# Patient Record
Sex: MALE
[De-identification: names, ages, dates, MRNs, and addresses within clinical notes are randomized per-mention and may not be internally consistent; named-entity substitution may affect disease eponyms.]

---

## 2019-02-26 ENCOUNTER — HOSPITAL ENCOUNTER (EMERGENCY)
Dept: HOSPITAL 97 - ER | Age: 59
Discharge: ANOTHER HOSPITAL | End: 2019-02-26
Payer: COMMERCIAL

## 2019-02-26 DIAGNOSIS — K72.00: Primary | ICD-10-CM

## 2019-02-26 DIAGNOSIS — C85.90: ICD-10-CM

## 2019-02-26 LAB
ALBUMIN SERPL BCP-MCNC: 3.3 G/DL (ref 3.4–5)
ALP SERPL-CCNC: 743 U/L (ref 45–117)
ALT SERPL W P-5'-P-CCNC: 541 U/L (ref 12–78)
AST SERPL W P-5'-P-CCNC: 307 U/L (ref 15–37)
BUN BLD-MCNC: 13 MG/DL (ref 7–18)
GLUCOSE SERPLBLD-MCNC: 92 MG/DL (ref 74–106)
HCT VFR BLD CALC: 43.3 % (ref 39.6–49)
INR BLD: 1.07
LYMPHOCYTES # SPEC AUTO: 1.5 K/UL (ref 0.7–4.9)
MAGNESIUM SERPL-MCNC: 1.9 MG/DL (ref 1.8–2.4)
NT-PROBNP SERPL-MCNC: 23 PG/ML (ref ?–125)
PMV BLD: 7.4 FL (ref 7.6–11.3)
POTASSIUM SERPL-SCNC: 3.8 MMOL/L (ref 3.5–5.1)
RBC # BLD: 4.99 M/UL (ref 4.33–5.43)
TROPONIN (EMERG DEPT USE ONLY): < 0.02 NG/ML (ref 0–0.04)

## 2019-02-26 PROCEDURE — 85610 PROTHROMBIN TIME: CPT

## 2019-02-26 PROCEDURE — 74177 CT ABD & PELVIS W/CONTRAST: CPT

## 2019-02-26 PROCEDURE — 36415 COLL VENOUS BLD VENIPUNCTURE: CPT

## 2019-02-26 PROCEDURE — 84484 ASSAY OF TROPONIN QUANT: CPT

## 2019-02-26 PROCEDURE — 71260 CT THORAX DX C+: CPT

## 2019-02-26 PROCEDURE — 80048 BASIC METABOLIC PNL TOTAL CA: CPT

## 2019-02-26 PROCEDURE — 83880 ASSAY OF NATRIURETIC PEPTIDE: CPT

## 2019-02-26 PROCEDURE — 93005 ELECTROCARDIOGRAM TRACING: CPT

## 2019-02-26 PROCEDURE — 80076 HEPATIC FUNCTION PANEL: CPT

## 2019-02-26 PROCEDURE — 71045 X-RAY EXAM CHEST 1 VIEW: CPT

## 2019-02-26 PROCEDURE — 83735 ASSAY OF MAGNESIUM: CPT

## 2019-02-26 PROCEDURE — 85025 COMPLETE CBC W/AUTO DIFF WBC: CPT

## 2019-02-26 NOTE — RAD REPORT
EXAM DESCRIPTION:  CT - Chest Abdomen Pelvis W Cont - 2/26/2019 8:00 pm

 

CLINICAL HISTORY:  Abdominal pain, chest pain, abnormal CT abdomen

 

COMPARISON:  CT abdomen and pelvis February 13

 

TECHNIQUE:  Following dynamic enhancement using 100 milliliters nonionic IV contrast, axial imaging o
f the chest, abdomen and pelvis was performed.  Biphasic technique was utilized through the abdomen. 
No oral contrast was administered.

 

All CT scans are performed using dose optimization technique as appropriate and may include automated
 exposure control or mA/KV adjustment according to patient size.

 

FINDINGS:  Stranding changes are present in the right lung base where there is also elevated right he
midiaphragm. This is believed to be scarring or chronic atelectasis. Acute infiltrate is not suspecte
d.

 

Lung fields are otherwise clear. Minimal bilateral pleural effusions are present. No pneumothorax. No
 pleural based mass. Aorta and pulmonary arterial tree enhance normally. No pericardial thickening or
 effusion. Patient has very extensive individual and confluent mediastinal and hilar lymphadenopathy.
 Patient has lymphadenopathy in the right side pericardial fat as well. Largest confluent mass densit
y is subcarinal measuring 6 x 3 cm. No chest wall mass or axillary lymphadenopathy.

 

Multiple abnormal lymph nodes are present at the bilateral base of the neck.

 

The liver, spleen and pancreas show no new finding from the February 13 study. Portions of the pancre
as are obscured. No biliary tree dilatation. Gallbladder wall is edematous. Gallstones can be occult 
on CT imaging. Renal function is symmetric. No acute GB finding. No adrenal abnormalities.

 

Stomach is distended by fluid. No gastric wall thickening or mass. No dilated large or small bowel. M
ild diverticulosis is present. The primary acute GI process is not confirmed.

 

Congestion and edema are present along the omentum. There is an overall congestion or edema pattern t
o the peritoneal fat. Trace amount of free fluid is present in the peritoneal cavity. The very large 
bulky central mesenteric lymphadenopathy pattern is again identified. Overall mass density is larger 
than seen 2 weeks earlier. Margins are less well demarcated and this may be due to edema. Lymphadenop
athy extends further around the head of the pancreas and periportal region. No history provided to in
dicate initiation of therapy for this presumed lymphoma. The congestion and edema could be a therapeu
tic response.

 

No acute or destructive bony process.

 

No significant vascular findings.

 

 

IMPRESSION:  CT chest imaging shows extensive mediastinal and hilar lymphadenopathy as well as signif
icant lymphadenopathy at each neck base.

 

Minimal pleural effusions are present. There is scarring or chronic atelectasis abutting the elevated
 right hemidiaphragm.

 

The massive central abdominal lymphadenopathy has enlarged from the February 13 study. The individual
 and confluent lymph nodes are more edematous with poorly demarcated margins compared to the prior st
udy. This is potentially a therapeutic response but no history is available to indicate whether any t
herapy has been initiated.

 

No bowel obstruction, free air or surgically emergent finding.

 

Gallbladder wall edema without biliary tree dilatation. Gallstones and duct stones can be occult on C
T imaging.

## 2019-02-26 NOTE — ER
Nurse's Notes                                                                                     

 [facility]                                                                

Name: Paul Quintero                                                                                

Age: 58 yrs                                                                                       

Sex: Male                                                                                         

: 1960                                                                                   

MRN: [id number]                                                                                   

Arrival Date: 2019                                                                          

Time: 17:52                                                                                       

Account#: [id number]                                                                            

Bed 16                                                                                            

Private MD:                                                                                       

Diagnosis: Acute and subacute hepatic failure;Lymphoma                                            

                                                                                                  

Presentation:                                                                                     

                                                                                             

18:12 Presenting complaint: Patient states: Diffuse abdominal pain that radiates to back x 2  ph  

      weeks and abdominal bloating, also reports nausea and some diarrhea, denies vomiting or     

      fever. Transition of care: patient was not received from another setting of care. Onset     

      of symptoms was 2019. Risk Assessment: Do you want to hurt yourself or         

      someone else? Patient reports no desire to harm self or others. Care prior to arrival:      

      None.                                                                                       

18:12 Method Of Arrival: Ambulatory                                                           ph  

18:12 Acuity: CHELO 3                                                                           ph  

18:15 Initial Sepsis Screen: Does the patient meet any 2 criteria? No. Patient's initial      tw2 

      sepsis screen is negative. Does the patient have a suspected source of infection? No.       

      Patient's initial sepsis screen is negative.                                                

                                                                                                  

Historical:                                                                                       

- Allergies:                                                                                      

18:14 No Known Allergies;                                                                     ph  

- Home Meds:                                                                                      

22:00 None [Active];                                                                          ak1 

- PMHx:                                                                                           

22:00 None;                                                                                   ak1 

- PSHx:                                                                                           

22:00 None;                                                                                   ak1 

                                                                                                  

- Immunization history:: Adult Immunizations unknown.                                             

- Social history:: Smoking status: Patient/guardian denies using tobacco.                         

- Ebola Screening: : No symptoms or risks identified at this time.                                

                                                                                                  

                                                                                                  

Screenin:15 Abuse screen: Denies threats or abuse. Nutritional screening: No deficits noted.        tw2 

      Tuberculosis screening: No symptoms or risk factors identified. Fall Risk None              

      identified.                                                                                 

                                                                                                  

Assessment:                                                                                       

18:27 General: Appears in no apparent distress. uncomfortable, Behavior is calm, cooperative, tw2 

      appropriate for age. Pain: Complains of pain in abdomen. Neuro: Level of Consciousness      

      is awake, alert, obeys commands, Oriented to person, place, time, situation.                

      Cardiovascular: Heart tones S1 S2 Patient's skin is warm and dry. Respiratory: Airway       

      is patent Respiratory effort is even, unlabored, Respiratory pattern is regular,            

      symmetrical, Breath sounds are clear bilaterally. GI: Abdomen is round distended, Bowel     

      sounds present X 4 quads. Abd is soft X 4 quads Reports lower abdominal pain, upper         

      abdominal pain. : No signs and/or symptoms were reported regarding the genitourinary      

      system.                                                                                     

18:27 EENT: No signs and/or symptoms were reported regarding the EENT system. Derm: No signs  tw2 

      and/or symptoms reported regarding the dermatologic system. Skin is jaundiced.              

      Musculoskeletal: Circulation, motion, and sensation intact. Range of motion: intact in      

      all extremities.                                                                            

18:36 Reassessment: provider BINU Snow at bedside.                                           tw2 

19:38 Reassessment: laboratory staff tamara relayed critical laboratory ASt                 rr5 

      307,DTB115,Total bili6.1. ED provider aware.                                                

21:01 Reassessment: Patient appears in no apparent distress at this time. Patient and/or      ls4 

      family updated on plan of care and expected duration. Pain level reassessed. PT RESTING     

      QUIETLY, FAMILY AT BEDSIDE.                                                                 

21:51 Reassessment: Patient appears in no apparent distress at this time. Patient and/or      ls4 

      family updated on plan of care and expected duration. Pain level reassessed. pt             

      ambulated independently to bathroom. gate steady. NAD.                                      

                                                                                                  

Vital Signs:                                                                                      

18:13  / 102; Pulse 97; Resp 18; Temp 97.8; Pulse Ox 95% on R/A; Weight 79.38 kg;       ph  

      Height 5 ft. 3 in. (160.02 cm);                                                             

19:02  / 99; Pulse 91; Resp 20; Pulse Ox 92% on R/A;                                    tw2 

20:20  / 88; Pulse 92; Resp 16; Pulse Ox 96% on R/A;                                    ls4 

18:13 Body Mass Index 31.00 (79.38 kg, 160.02 cm)                                             ph  

19:02 pt placed on o2 via nc at 2L, o2 at 96%,                                                tw2 

                                                                                                  

ED Course:                                                                                        

17:52 Patient arrived in ED.                                                                  mr  

18:12 Bed in low position. Call light in reach. Pulse ox on. NIBP on.                         tw2 

18:13 Triage completed.                                                                       ph  

18:14 Arm band placed on Patient placed in an exam room.                                      ph  

18:15 Cuca Land, BYRON is Primary Nurse.                                                        tw2 

18:30 Obinna Perez MD is Attending Physician.                                             [email or web address] 

18:37 Dustin Peacock PA is PHCP.                                                               [id number] 

18:37 Obinna Perez MD is Attending Physician.                                             [id number] 

18:42 Radiology exam delayed due to lab results not completed at this time. (BUN/Creatinine). 2 

18:49 EKG done, by ED staff, reviewed by Dustin SCHMID.                                      [facility] 

18:53 Radiology exam delayed due to lab results not completed at this time. (BUN/Creatinine). nj  

18:53 XRAY Chest (1 view) In Process Unspecified.                                             EDMS

19:21 Radiology exam delayed due to lab results not completed at this time. (BUN/Creatinine). 2 

19:26 Report given to BYRON Cantrell.                                                                tw2 

19:36 Radiology exam delayed due to lab results not completed at this time. (BUN/Creatinine). nj  

19:57 Patient moved to CT via stretcher.                                                      nj  

20:00 CT Chest, Abdomen, Pelvis - W/Contrast In Process Unspecified.                          EDMS

20:00 CT completed. Patient tolerated procedure well. Patient moved back from CT.             nj  

21:59 No provider procedures requiring assistance completed.                                  ak1 

22:01 Inserted saline lock: 20 gauge in left antecubital area, using aseptic technique.       ak1 

      ,using aseptic technique. placed prior to this nurse taking over care.                      

22:02 Patient transferred, IV remains in place.                                               ak1 

                                                                                                  

Administered Medications:                                                                         

18:47 Drug: Zofran 4 mg Route: IVP; Site: left antecubital;                                   tw2 

19:25 Follow up: Response: No adverse reaction                                                tw2 

18:50 Drug: fentaNYL (PF) 75 mcg Route: IVP; Site: left antecubital;                          tw2 

19:25 Follow up: Response: No adverse reaction; Pain is decreased                             tw2 

                                                                                                  

                                                                                                  

Outcome:                                                                                          

21:59 ER care complete, transfer ordered by MD.                                               [facility] 

22:02 Condition: stable                                                                       ak1 

22:02 Instructed on the need for transfer.                                                        

22:34 Transferred by ground EMS to [facility], Transfer form completed.    ak1 

      X-rays sent w/ patient. Note:  report given to Ghislaine the nurse for room  910 on 9      

      tower                                                                                       

23:07 Patient left the ED.                                                                    ak1 

                                                                                                  

Signatures:                                                                                       

Dispatcher [employer]                           EDMS                                                 

Obinna Perez MD MD cha Rivera, Soledad                                 mr                                                   

Inezesiva, BINU Chan   jr8                                                  

Lauren Herrera RN                       RN   ak1                                                  

Viridiana Inman RN RN                                                      

Cuca Land RN RN   2                                                  

Doni Isabel Maria                              [facility]                                                  

WorkmanRadha craig                            2                                                  

Tamia Arcos RN                       RN   ls4                                                  

Diallo Hernandez RN                      RN   rr5                                                  

                                                                                                  

Corrections: (The following items were deleted from the chart)                                    

19:04 18:27 General: Appears in no apparent distress. Behavior is calm, cooperative,          tw2 

      appropriate for age, tw2                                                                    

21:53 21:01 Reassessment: Patient appears in no apparent distress at this time. Patient       ls4 

      and/or family updated on plan of care and expected duration. Pain level reassessed.         

      Patient is alert, oriented x 3, equal unlabored respirations, skin warm/dry/pink. PT        

      RESTING QUIETLY, FAMILY AT BEDSIDE ls4                                                      

21:53 21:51 Reassessment: Patient appears in no apparent distress at this time. Patient       ls4 

      and/or family updated on plan of care and expected duration. Pain level reassessed.         

      Patient is alert, oriented x 3, equal unlabored respirations, skin warm/dry/pink. ls4       

                                                                                                  

**************************************************************************************************

## 2019-02-26 NOTE — EDPHYS
Physician Documentation                                                                           

 [facility]                                                                

Name: Paul Quintero                                                                                

Age: 58 yrs                                                                                       

Sex: Male                                                                                         

: 1960                                                                                   

MRN: [id number]                                                                                   

Arrival Date: 2019                                                                          

Time: 17:52                                                                                       

Account#: [id number]                                                                            

Bed 16                                                                                            

Private MD:                                                                                       

ED Physician Obinna Perez                                                                      

HPI:                                                                                              

                                                                                             

19:24 This 58 yrs old  Male presents to ER via Ambulatory with complaints of          jr8 

      Abdominal Swelling.                                                                         

19:24 Onset: The symptoms/episode began/occurred gradually, 2 week(s) ago. The symptoms do    jr8 

      not radiate. Associated signs and symptoms: none. The symptoms are described as dull.       

      Modifying factors: The symptoms are alleviated by nothing, the symptoms are aggravated      

      by nothing. Severity of pain: At its worst the pain was moderate in the emergency           

      department the pain is unchanged. The patient has not experienced similar symptoms in       

      the past. The patient has been recently seen by a physician:. Patient stated that he        

      has had abdominal pain for the past couple of weeks. Stated that he was seen by GI and      

      had CT abd/pelvis with contrast completed. Unknown what results are but brought report      

      for us to read. Stated that they have not told him anything yet. Had endoscopy a week       

      later showing gastritis. Scheduled for colonoscopy next week. Stated that pain is           

      getting worse along with distension .                                                       

                                                                                                  

Historical:                                                                                       

- Allergies:                                                                                      

18:14 No Known Allergies;                                                                     ph  

- Home Meds:                                                                                      

22:00 None [Active];                                                                          ak1 

- PMHx:                                                                                           

22:00 None;                                                                                   ak1 

- PSHx:                                                                                           

22:00 None;                                                                                   ak1 

                                                                                                  

- Immunization history:: Adult Immunizations unknown.                                             

- Social history:: Smoking status: Patient/guardian denies using tobacco.                         

- Ebola Screening: : No symptoms or risks identified at this time.                                

                                                                                                  

                                                                                                  

ROS:                                                                                              

19:24 Eyes: Negative for injury, pain, redness, and discharge, ENT: Negative for injury,      jr8 

      pain, and discharge, Neck: Negative for injury, pain, and swelling, Cardiovascular:         

      Negative for chest pain, palpitations, and edema, Respiratory: Negative for shortness       

      of breath, cough, wheezing, and pleuritic chest pain, Back: Negative for injury and         

      pain, MS/Extremity: Negative for injury and deformity, Skin: Negative for injury, rash,     

      and discoloration, Neuro: Negative for headache, weakness, numbness, tingling, and          

      seizure.                                                                                    

19:24 Abdomen/GI: Positive for abdominal pain, abdominal distension, Negative for nausea,         

      vomiting, and diarrhea, constipation, anorexia, dysphagia, hematemesis, black/tarry         

      stool, rectal pain, rectal bleeding, bowel incontinence, flatulence.                        

                                                                                                  

Exam:                                                                                             

19:24 Head/Face:  Normocephalic, atraumatic. ENT:  Nares patent. No nasal discharge, no       jr8 

      septal abnormalities noted.  Tympanic membranes are normal and external auditory canals     

      are clear.  Oropharynx with no redness, swelling, or masses, exudates, or evidence of       

      obstruction, uvula midline.  Mucous membranes moist. Neck:  Trachea midline, no             

      thyromegaly or masses palpated, and no cervical lymphadenopathy.  Supple, full range of     

      motion without nuchal rigidity, or vertebral point tenderness.  No Meningismus.             

      Cardiovascular:  Regular rate and rhythm with a normal S1 and S2.  No gallops, murmurs,     

      or rubs.  Normal PMI, no JVD.  No pulse deficits. Respiratory:  Lungs have equal breath     

      sounds bilaterally, clear to auscultation and percussion.  No rales, rhonchi or wheezes     

      noted.  No increased work of breathing, no retractions or nasal flaring. Back:  No          

      spinal tenderness.  No costovertebral tenderness.  Full range of motion. Skin:  Warm,       

      dry with normal turgor.  Normal color with no rashes, no lesions, and no evidence of        

      cellulitis. MS/ Extremity:  Pulses equal, no cyanosis.  Neurovascular intact.  Full,        

      normal range of motion. Neuro:  Awake and alert, GCS 15, oriented to person, place,         

      time, and situation.  Cranial nerves II-XII grossly intact.  Motor strength 5/5 in all      

      extremities.  Sensory grossly intact.  Cerebellar exam normal.  Normal gait.                

19:24 Eyes: Periorbital structures: appear normal, Pupils: equal, round, and reactive to          

      light and accomodation, Extraocular movements: intact throughout, Conjunctiva: normal,      

      Corneas: are normal, Sclera: icterus, is present, Anterior chamber: normal, Lids and        

      lashes: appear normal.                                                                      

19:24 Abdomen/GI: Inspection: distension, Bowel sounds: active, all quadrants, Palpation:         

      soft, in all quadrants, mild abdominal tenderness, in the abdomen diffusely,                

      Indicators: McBurney's point is not tender, Ross's sign is negative, Rovsing's sign       

      is negative, Liver: tenderness, is not appreciated.                                         

                                                                                                  

Vital Signs:                                                                                      

18:13  / 102; Pulse 97; Resp 18; Temp 97.8; Pulse Ox 95% on R/A; Weight 79.38 kg;       ph  

      Height 5 ft. 3 in. (160.02 cm);                                                             

19:02  / 99; Pulse 91; Resp 20; Pulse Ox 92% on R/A;                                    tw2 

20:20  / 88; Pulse 92; Resp 16; Pulse Ox 96% on R/A;                                    ls4 

18:13 Body Mass Index 31.00 (79.38 kg, 160.02 cm)                                             ph  

19:02 pt placed on o2 via nc at 2L, o2 at 96%,                                                tw2 

                                                                                                  

MDM:                                                                                              

18:30 Patient medically screened.                                                             [email or web address] 

21:17 Data reviewed: vital signs, nurses notes, lab test result(s), radiologic studies, CT    jr 

      scan, plain films. Data interpreted: Pulse oximetry: on room air is 96 %.                   

      Interpretation: normal. Counseling: I had a detailed discussion with the patient and/or     

      guardian regarding: the historical points, exam findings, and any diagnostic results        

      supporting the discharge/admit diagnosis, lab results, radiology results, the need to       

      transfer to another facility, for higher level of care, [facility]        

      does not immediately have the required specialist.                                          

21:57 ED course: Dr. Burns from [facility] consulted along with GI and Oncology .                                                                                                           

18:38 Order name: Basic Metabolic Panel; Complete Time: 19:41                                                                                                                              

18:38 Order name: CBC with Diff; Complete Time: 19:23                                                                                                                                      

18:38 Order name: LFT's; Complete Time: 19:41                                                                                                                                              

18:38 Order name: Magnesium; Complete Time: 19:41                                                                                                                                          

18:38 Order name: NT PRO-BNP; Complete Time: 19:41                                                                                                                                         

18:38 Order name: PT-INR; Complete Time: 19:23                                                                                                                                             

18:38 Order name: Troponin (emerg Dept Use Only); Complete Time: 19:41                                                                                                                     

18:38 Order name: XRAY Chest (1 view); Complete Time: 19:23                                                                                                                                

18:38 Order name: EKG; Complete Time: 18:38                                                                                                                                                

18:38 Order name: Cardiac monitoring; Complete Time: 18:41                                                                                                                                 

18:38 Order name: EKG - Nurse/Tech; Complete Time: 18:41                                                                                                                                   

18:38 Order name: CT Chest, Abdomen, Pelvis - W/Contrast; Complete Time: 21:05                                                                                                             

18:38 Order name: IV Saline Lock; Complete Time: 19:02                                                                                                                                     

18:38 Order name: Labs collected and sent; Complete Time: 19:02                                                                                                                            

18:38 Order name: O2 Per Protocol; Complete Time: 18:41                                                                                                                                    

18:38 Order name: O2 Sat Monitoring; Complete Time: 18:41                                      

                                                                                                  

Administered Medications:                                                                         

18:47 Drug: Zofran 4 mg Route: IVP; Site: left antecubital;                                   tw2 

19:25 Follow up: Response: No adverse reaction                                                tw2 

18:50 Drug: fentaNYL (PF) 75 mcg Route: IVP; Site: left antecubital;                          tw2 

19:25 Follow up: Response: No adverse reaction; Pain is decreased                             tw2 

                                                                                                  

                                                                                                  

Disposition:                                                                                      

19 21:59 Transfer ordered to [facility]. Diagnosis are Acute and        

  subacute hepatic failure, Lymphoma .                                                            

- Reason for transfer: Higher level of care.                                                      

- Accepting physician is Dr. Burns.                                                               

- Condition is Fair.                                                                              

- Problem is new.                                                                                 

- Symptoms are unchanged.                                                                         

                                                                                                  

                                                                                                  

                                                                                                  

Signatures:                                                                                       

Dispatcher [employer]                           EDObinna Castaneda MD MD cha Roszak, Josh, PA PA   jr8                                                  

Lauren Herrera RN                       RN   ak1                                                  

Viridiana Inman RN                      RN                                                      

Cuca Land RN                          RN   tw2                                                  

                                                                                                  

Corrections: (The following items were deleted from the chart)                                    

23:07 21:59 2019 21:59 Transfer ordered to [facility]. Diagnosis is ak1 

      Acute and subacute hepatic failure; Lymphoma . Reason for transfer: Higher level of         

      care. Accepting physician is Dr. Burns. Condition is Fair. Problem is new. Symptoms are     

      unchanged. jr8                                                                              

                                                                                                  

**************************************************************************************************

## 2019-02-26 NOTE — RAD REPORT
EXAM DESCRIPTION:  RAD - Chest Single View - 2/26/2019 6:52 pm

 

CLINICAL HISTORY:  Diffuse abdominal pain radiating to the back

 

COMPARISON:  CT imaging February 13.

 

TECHNIQUE:  AP portable chest image was obtained 1847 hour .

 

FINDINGS:  Lung volumes are low. Right base opacification is present obscuring the right hemidiaphrag
m and the right heart border. Patient has right hemidiaphragm elevation. Right base opacification may
 be chronic atelectasis. A small pleural effusion is suspected. Heart size is normal. Widening in the
 mediastinum is noted. Reason CT study showed significant lymphadenopathy pattern in the abdomen and 
pelvis. This mediastinal finding is likely lymphadenopathy as well. No measurable pleural effusion an
d no pneumothorax. No acute bony abnormality seen. No acute aortic findings suspected.

 

IMPRESSION:  Small right pleural effusion is suspected. Right lung base opacification could be chroni
c atelectasis or minimal pneumonia.

 

Mediastinal mass density present presumed to be lymphadenopathy pattern similar to the CT abdomen fin
ding.

## 2019-04-14 ENCOUNTER — HOSPITAL ENCOUNTER (INPATIENT)
Dept: HOSPITAL 97 - ER | Age: 59
LOS: 3 days | Discharge: HOME | DRG: 186 | End: 2019-04-17
Attending: FAMILY MEDICINE | Admitting: HOSPITALIST
Payer: COMMERCIAL

## 2019-04-14 VITALS — BODY MASS INDEX: 25.2 KG/M2

## 2019-04-14 DIAGNOSIS — J90: Primary | ICD-10-CM

## 2019-04-14 DIAGNOSIS — B19.10: ICD-10-CM

## 2019-04-14 DIAGNOSIS — D70.9: ICD-10-CM

## 2019-04-14 DIAGNOSIS — D63.8: ICD-10-CM

## 2019-04-14 DIAGNOSIS — C85.99: ICD-10-CM

## 2019-04-14 DIAGNOSIS — I10: ICD-10-CM

## 2019-04-14 DIAGNOSIS — R73.9: ICD-10-CM

## 2019-04-14 DIAGNOSIS — R50.81: ICD-10-CM

## 2019-04-14 DIAGNOSIS — K21.9: ICD-10-CM

## 2019-04-14 DIAGNOSIS — Z87.891: ICD-10-CM

## 2019-04-14 DIAGNOSIS — D72.819: ICD-10-CM

## 2019-04-14 DIAGNOSIS — J18.9: ICD-10-CM

## 2019-04-14 LAB
ALBUMIN SERPL BCP-MCNC: 2.6 G/DL (ref 3.4–5)
ALP SERPL-CCNC: 108 U/L (ref 45–117)
ALT SERPL W P-5'-P-CCNC: 50 U/L (ref 12–78)
AST SERPL W P-5'-P-CCNC: 24 U/L (ref 15–37)
BUN BLD-MCNC: 17 MG/DL (ref 7–18)
GLUCOSE SERPLBLD-MCNC: 104 MG/DL (ref 74–106)
HCT VFR BLD CALC: 32.3 % (ref 39.6–49)
INR BLD: 1.28
LIPASE SERPL-CCNC: 117 U/L (ref 73–393)
LYMPHOCYTES # SPEC AUTO: 1.2 K/UL (ref 0.7–4.9)
MORPHOLOGY BLD-IMP: (no result)
PMV BLD: 7.5 FL (ref 7.6–11.3)
POTASSIUM SERPL-SCNC: 3.6 MMOL/L (ref 3.5–5.1)
RBC # BLD: 3.93 M/UL (ref 4.33–5.43)
STOMATOCYTES BLD QL SMEAR: (no result)
TROPONIN (EMERG DEPT USE ONLY): < 0.02 NG/ML (ref 0–0.04)

## 2019-04-14 PROCEDURE — 32555 ASPIRATE PLEURA W/ IMAGING: CPT

## 2019-04-14 PROCEDURE — 85014 HEMATOCRIT: CPT

## 2019-04-14 PROCEDURE — 83690 ASSAY OF LIPASE: CPT

## 2019-04-14 PROCEDURE — 81003 URINALYSIS AUTO W/O SCOPE: CPT

## 2019-04-14 PROCEDURE — 85018 HEMOGLOBIN: CPT

## 2019-04-14 PROCEDURE — 93005 ELECTROCARDIOGRAM TRACING: CPT

## 2019-04-14 PROCEDURE — 96375 TX/PRO/DX INJ NEW DRUG ADDON: CPT

## 2019-04-14 PROCEDURE — 71045 X-RAY EXAM CHEST 1 VIEW: CPT

## 2019-04-14 PROCEDURE — 71046 X-RAY EXAM CHEST 2 VIEWS: CPT

## 2019-04-14 PROCEDURE — 80061 LIPID PANEL: CPT

## 2019-04-14 PROCEDURE — 85610 PROTHROMBIN TIME: CPT

## 2019-04-14 PROCEDURE — 80202 ASSAY OF VANCOMYCIN: CPT

## 2019-04-14 PROCEDURE — 84132 ASSAY OF SERUM POTASSIUM: CPT

## 2019-04-14 PROCEDURE — 80048 BASIC METABOLIC PNL TOTAL CA: CPT

## 2019-04-14 PROCEDURE — 87040 BLOOD CULTURE FOR BACTERIA: CPT

## 2019-04-14 PROCEDURE — 85730 THROMBOPLASTIN TIME PARTIAL: CPT

## 2019-04-14 PROCEDURE — 81015 MICROSCOPIC EXAM OF URINE: CPT

## 2019-04-14 PROCEDURE — 94760 N-INVAS EAR/PLS OXIMETRY 1: CPT

## 2019-04-14 PROCEDURE — 96366 THER/PROPH/DIAG IV INF ADDON: CPT

## 2019-04-14 PROCEDURE — 94640 AIRWAY INHALATION TREATMENT: CPT

## 2019-04-14 PROCEDURE — 85025 COMPLETE CBC W/AUTO DIFF WBC: CPT

## 2019-04-14 PROCEDURE — 83605 ASSAY OF LACTIC ACID: CPT

## 2019-04-14 PROCEDURE — 83735 ASSAY OF MAGNESIUM: CPT

## 2019-04-14 PROCEDURE — 96368 THER/DIAG CONCURRENT INF: CPT

## 2019-04-14 PROCEDURE — 93306 TTE W/DOPPLER COMPLETE: CPT

## 2019-04-14 PROCEDURE — 76604 US EXAM CHEST: CPT

## 2019-04-14 PROCEDURE — 88305 TISSUE EXAM BY PATHOLOGIST: CPT

## 2019-04-14 PROCEDURE — 99285 EMERGENCY DEPT VISIT HI MDM: CPT

## 2019-04-14 PROCEDURE — 84145 PROCALCITONIN (PCT): CPT

## 2019-04-14 PROCEDURE — 84484 ASSAY OF TROPONIN QUANT: CPT

## 2019-04-14 PROCEDURE — 89050 BODY FLUID CELL COUNT: CPT

## 2019-04-14 PROCEDURE — 36415 COLL VENOUS BLD VENIPUNCTURE: CPT

## 2019-04-14 PROCEDURE — 96365 THER/PROPH/DIAG IV INF INIT: CPT

## 2019-04-14 PROCEDURE — 88108 CYTOPATH CONCENTRATE TECH: CPT

## 2019-04-14 PROCEDURE — 71260 CT THORAX DX C+: CPT

## 2019-04-14 PROCEDURE — 87070 CULTURE OTHR SPECIMN AEROBIC: CPT

## 2019-04-14 PROCEDURE — 80076 HEPATIC FUNCTION PANEL: CPT

## 2019-04-14 PROCEDURE — 84100 ASSAY OF PHOSPHORUS: CPT

## 2019-04-14 PROCEDURE — 80053 COMPREHEN METABOLIC PANEL: CPT

## 2019-04-14 NOTE — XMS REPORT
Patient Summary Document

 Created on:2019



Patient:CAR SANCHEZ

Sex:Male

:1960

External Reference #:[id number]





Demographics







 Address  [address] [place] [address]

 

 Home Phone  [phone]

 

 Work Phone  [phone]

 

 Email Address  [email or web address]

 

 Preferred Language  Unknown

 

 Marital Status  Unknown

 

 Roman Catholic Affiliation  Unknown

 

 Race  Unknown

 

 Additional Race(s)  Unavailable

 

 Ethnic Group  Unknown









Author







 Organization  [facility]

 

 Address  [address]



   [place] [address]

 

 Phone  [phone]









Care Team Providers







 Name  Role  Phone

 

 RAMONALEMUEL BARBARA  Unavailable  Unavailable

 

 ANAND MORTON  Unavailable  Unavailable

 

 SAPNA MERRITT  Unavailable  Unavailable









Problems

This patient has no known problems.



Allergies, Adverse Reactions, Alerts

This patient has no known allergies or adverse reactions.



Medications

This patient has no known medications.



Results







 Test Description  Test Time  Test Comments  Text Results  Atomic Results  
Result Comments









 URIC ACID  2019 05:44:00    









   Test Item  Value  Reference Range  Comments









 URIC ACID (BEAKER) (test code=773)  1.6 mg/dL  2.6-7.2  



[id number] 05:36:00





 Test Item  Value  Reference Range  Comments

 

 MAGNESIUM (BEAKER) (test code=627)  1.8 mg/dL  1.6-2.6  



[id number] 05:36:00





 Test Item  Value  Reference Range  Comments

 

 PHOSPHORUS (BEAKER) (test code=604)  3.1 mg/dL  2.3-4.7  



BASIC METABOLIC [id number] 05:36:00





 Test Item  Value  Reference Range  Comments

 

 SODIUM (BEAKER) (test  137 meq/L  136-145  



 dxip=486)      

 

 POTASSIUM (BEAKER) (test  3.4 meq/L  3.5-5.1  



 code=379)      

 

 CHLORIDE (BEAKER) (test  104 meq/L    



 code=382)      

 

 CO2 (BEAKER) (test  26 meq/L  22-29  



 code=355)      

 

 BLOOD UREA NITROGEN  11 mg/dL  7-21  



 (BEAKER) (test code=354)      

 

 CREATININE (BEAKER) (test  0.59 mg/dL  0.57-1.25  



 code=358)      

 

 GLUCOSE RANDOM (BEAKER)  134 mg/dL    



 (test code=652)      

 

 CALCIUM (BEAKER) (test  8.3 mg/dL  8.4-10.2  



 code=697)      

 

 EGFR (BEAKER) (test  141 mL/min/1.73 sq m    ESTIMATED GFR IS NOT AS



 code=1092)      ACCURATE AS CREATININE



       CLEARANCE IN PREDICTING



       GLOMERULAR FILTRATION



       RATE. ESTIMATED GFR IS



       NOT APPLICABLE FOR



       DIALYSIS PATIENTS.



HEPATIC FUNCTION [id number] 05:36:00





 Test Item  Value  Reference Range  Comments

 

 TOTAL PROTEIN (BEAKER) (test code=770)  5.9 gm/dL  6.0-8.3  

 

 ALBUMIN (BEAKER) (test code=1145)  2.7 g/dL  3.5-5.0  

 

 BILIRUBIN TOTAL (BEAKER) (test code=377)  1.1 mg/dL  0.2-1.2  

 

 BILIRUBIN DIRECT (BEAKER) (test code=706)  0.5 mg/dL  0.1-0.5  

 

 ALKALINE PHOSPHATASE (BEAKER) (test code=346)  96 U/L    

 

 AST (SGOT) (BEAKER) (test code=353)  28 U/L  5-34  

 

 ALT (SGPT) (BEAKER) (test code=347)  45 U/L  6-55  



LACTATE DEHYDROGENASE (LDH)2019 05:36:00





 Test Item  Value  Reference Range  Comments

 

 LACTATE DEHYDROGENASE (BEAKER) (test code=635)  219 U/L  125-220  



CBC W/PLT COUNT &amp; AUTO [id number] 05:13:00





 Test Item  Value  Reference Range  Comments

 

 WHITE BLOOD CELL COUNT (BEAKER) (test code=775)  6.6 K/ L  3.5-10.5  

 

 RED BLOOD CELL COUNT (BEAKER) (test code=761)  3.40 M/ L  4.63-6.08  

 

 HEMOGLOBIN (BEAKER) (test code=410)  9.3 GM/DL  13.7-17.5  

 

 HEMATOCRIT (BEAKER) (test code=411)  28.6 %  40.1-51.0  

 

 MEAN CORPUSCULAR VOLUME (BEAKER) (test code=753)  84.1 fL  79.0-92.2  

 

 MEAN CORPUSCULAR HEMOGLOBIN (BEAKER) (test  27.4 pg  25.7-32.2  



 thxk=979)      

 

 MEAN CORPUSCULAR HEMOGLOBIN CONC (BEAKER) (test  32.5 GM/DL  32.3-36.5  



 code=752)      

 

 RED CELL DISTRIBUTION WIDTH (BEAKER) (test  13.2 %  11.6-14.4  



 code=412)      

 

 PLATELET COUNT (BEAKER) (test code=756)  373 K/CU MM  150-450  

 

 MEAN PLATELET VOLUME (BEAKER) (test code=754)  9.0 fL  9.4-12.4  

 

 NUCLEATED RED BLOOD CELLS (BEAKER) (test  0 /100 WBC  0-0  



 code=413)      

 

 NEUTROPHILS RELATIVE PERCENT (BEAKER) (test  82 %    



 code=429)      

 

 LYMPHOCYTES RELATIVE PERCENT (BEAKER) (test  16 %    



 code=430)      

 

 MONOCYTES RELATIVE PERCENT (BEAKER) (test  1 %    



 code=431)      

 

 EOSINOPHILS RELATIVE PERCENT (BEAKER) (test  0 %    



 code=432)      

 

 BASOPHILS RELATIVE PERCENT (BEAKER) (test  0 %    



 code=437)      

 

 NEUTROPHILS ABSOLUTE COUNT (BEAKER) (test  5.44 K/ L  1.78-5.38  



 code=670)      

 

 LYMPHOCYTES ABSOLUTE COUNT (BEAKER) (test  1.06 K/ L  1.32-3.57  



 code=414)      

 

 MONOCYTES ABSOLUTE COUNT (BEAKER) (test  0.05 K/ L  0.30-0.82  



 code=415)      

 

 EOSINOPHILS ABSOLUTE COUNT (BEAKER) (test  0.00 K/ L  0.04-0.54  



 code=416)      

 

 BASOPHILS ABSOLUTE COUNT (BEAKER) (test  0.00 K/ L  0.01-0.08  



 code=417)      

 

 IMMATURE GRANULOCYTES-RELATIVE PERCENT (BEAKER)  1 %  0-1  



 (test code=2801)      



URIC [id number] 06:28:00





 Test Item  Value  Reference Range  Comments

 

 URIC ACID (BEAKER) (test code=773)  1.7 mg/dL  2.6-7.2  



[id number] 06:21:00





 Test Item  Value  Reference Range  Comments

 

 MAGNESIUM (BEAKER) (test code=627)  2.0 mg/dL  1.6-2.6  



[id number] 06:21:00





 Test Item  Value  Reference Range  Comments

 

 PHOSPHORUS (BEAKER) (test code=604)  2.6 mg/dL  2.3-4.7  



BASIC METABOLIC [id number] 06:21:00





 Test Item  Value  Reference Range  Comments

 

 SODIUM (BEAKER) (test  140 meq/L  136-145  



 qplf=702)      

 

 POTASSIUM (BEAKER) (test  3.5 meq/L  3.5-5.1  



 code=379)      

 

 CHLORIDE (BEAKER) (test  108 meq/L    



 code=382)      

 

 CO2 (BEAKER) (test  23 meq/L  22-29  



 code=355)      

 

 BLOOD UREA NITROGEN  11 mg/dL  7-21  



 (BEAKER) (test code=354)      

 

 CREATININE (BEAKER) (test  0.59 mg/dL  0.57-1.25  



 code=358)      

 

 GLUCOSE RANDOM (BEAKER)  136 mg/dL    



 (test code=652)      

 

 CALCIUM (BEAKER) (test  8.6 mg/dL  8.4-10.2  



 code=697)      

 

 EGFR (BEAKER) (test  141 mL/min/1.73 sq m    ESTIMATED GFR IS NOT AS



 code=1092)      ACCURATE AS CREATININE



       CLEARANCE IN PREDICTING



       GLOMERULAR FILTRATION



       RATE. ESTIMATED GFR IS



       NOT APPLICABLE FOR



       DIALYSIS PATIENTS.



HEPATIC FUNCTION [id number] 06:21:00





 Test Item  Value  Reference Range  Comments

 

 TOTAL PROTEIN (BEAKER) (test code=770)  6.2 gm/dL  6.0-8.3  

 

 ALBUMIN (BEAKER) (test code=1145)  2.7 g/dL  3.5-5.0  

 

 BILIRUBIN TOTAL (BEAKER) (test code=377)  0.8 mg/dL  0.2-1.2  

 

 BILIRUBIN DIRECT (BEAKER) (test code=706)  0.5 mg/dL  0.1-0.5  

 

 ALKALINE PHOSPHATASE (BEAKER) (test code=346)  97 U/L    

 

 AST (SGOT) (BEAKER) (test code=353)  34 U/L  5-34  

 

 ALT (SGPT) (BEAKER) (test code=347)  41 U/L  6-55  



LACTATE DEHYDROGENASE (LDH)2019 06:21:00





 Test Item  Value  Reference Range  Comments

 

 LACTATE DEHYDROGENASE (BEAKER) (test code=635)  188 U/L  125-220  



CBC W/PLT COUNT &amp; AUTO [id number] 05:48:00





 Test Item  Value  Reference Range  Comments

 

 WHITE BLOOD CELL COUNT (BEAKER) (test code=775)  8.7 K/ L  3.5-10.5  

 

 RED BLOOD CELL COUNT (BEAKER) (test code=761)  3.53 M/ L  4.63-6.08  

 

 HEMOGLOBIN (BEAKER) (test code=410)  9.8 GM/DL  13.7-17.5  

 

 HEMATOCRIT (BEAKER) (test code=411)  29.9 %  40.1-51.0  

 

 MEAN CORPUSCULAR VOLUME (BEAKER) (test code=753)  84.7 fL  79.0-92.2  

 

 MEAN CORPUSCULAR HEMOGLOBIN (BEAKER) (test  27.8 pg  25.7-32.2  



 fasv=212)      

 

 MEAN CORPUSCULAR HEMOGLOBIN CONC (BEAKER) (test  32.8 GM/DL  32.3-36.5  



 code=752)      

 

 RED CELL DISTRIBUTION WIDTH (BEAKER) (test  13.6 %  11.6-14.4  



 code=412)      

 

 PLATELET COUNT (BEAKER) (test code=756)  442 K/CU MM  150-450  

 

 MEAN PLATELET VOLUME (BEAKER) (test code=754)  9.2 fL  9.4-12.4  

 

 NUCLEATED RED BLOOD CELLS (BEAKER) (test  0 /100 WBC  0-0  



 code=413)      

 

 NEUTROPHILS RELATIVE PERCENT (BEAKER) (test  90 %    



 code=429)      

 

 LYMPHOCYTES RELATIVE PERCENT (BEAKER) (test  8 %    



 code=430)      

 

 MONOCYTES RELATIVE PERCENT (BEAKER) (test  1 %    



 code=431)      

 

 EOSINOPHILS RELATIVE PERCENT (BEAKER) (test  0 %    



 code=432)      

 

 BASOPHILS RELATIVE PERCENT (BEAKER) (test  0 %    



 code=437)      

 

 NEUTROPHILS ABSOLUTE COUNT (BEAKER) (test  7.82 K/ L  1.78-5.38  



 code=670)      

 

 LYMPHOCYTES ABSOLUTE COUNT (BEAKER) (test  0.72 K/ L  1.32-3.57  



 code=414)      

 

 MONOCYTES ABSOLUTE COUNT (BEAKER) (test  0.05 K/ L  0.30-0.82  



 code=415)      

 

 EOSINOPHILS ABSOLUTE COUNT (BEAKER) (test  0.00 K/ L  0.04-0.54  



 code=416)      

 

 BASOPHILS ABSOLUTE COUNT (BEAKER) (test  0.00 K/ L  0.01-0.08  



 code=417)      

 

 IMMATURE GRANULOCYTES-RELATIVE PERCENT (BEAKER)  1 %  0-1  



 (test code=2801)      



URIC [id number] 09:51:00





 Test Item  Value  Reference Range  Comments

 

 URIC ACID (BEAKER) (test code=773)  1.7 mg/dL  2.6-7.2  



HEPATIC FUNCTION [id number] 09:11:00





 Test Item  Value  Reference Range  Comments

 

 TOTAL PROTEIN (BEAKER) (test code=770)  6.0 gm/dL  6.0-8.3  

 

 ALBUMIN (BEAKER) (test code=1145)  2.6 g/dL  3.5-5.0  

 

 BILIRUBIN TOTAL (BEAKER) (test code=377)  0.7 mg/dL  0.2-1.2  

 

 BILIRUBIN DIRECT (BEAKER) (test code=706)  0.4 mg/dL  0.1-0.5  

 

 ALKALINE PHOSPHATASE (BEAKER) (test code=346)  98 U/L    

 

 AST (SGOT) (BEAKER) (test code=353)  26 U/L  5-34  

 

 ALT (SGPT) (BEAKER) (test code=347)  32 U/L  6-55  



LACTATE DEHYDROGENASE (LDH)2019 09:11:00





 Test Item  Value  Reference Range  Comments

 

 LACTATE DEHYDROGENASE (BEAKER) (test code=635)  194 U/L  125-220  



[id number] 09:10:00





 Test Item  Value  Reference Range  Comments

 

 MAGNESIUM (BEAKER) (test code=627)  2.0 mg/dL  1.6-2.6  



[id number] 09:10:00





 Test Item  Value  Reference Range  Comments

 

 PHOSPHORUS (BEAKER) (test code=604)  2.6 mg/dL  2.3-4.7  



BASIC METABOLIC [id number] 09:10:00





 Test Item  Value  Reference Range  Comments

 

 SODIUM (BEAKER) (test  140 meq/L  136-145  



 xxei=265)      

 

 POTASSIUM (BEAKER) (test  3.3 meq/L  3.5-5.1  



 code=379)      

 

 CHLORIDE (BEAKER) (test  109 meq/L    



 code=382)      

 

 CO2 (BEAKER) (test  24 meq/L  22-29  



 code=355)      

 

 BLOOD UREA NITROGEN  9 mg/dL  7-21  



 (BEAKER) (test code=354)      

 

 CREATININE (BEAKER) (test  0.56 mg/dL  0.57-1.25  



 code=358)      

 

 GLUCOSE RANDOM (BEAKER)  137 mg/dL    



 (test code=652)      

 

 CALCIUM (BEAKER) (test  8.2 mg/dL  8.4-10.2  



 code=697)      

 

 EGFR (BEAKER) (test  149 mL/min/1.73 sq m    ESTIMATED GFR IS NOT AS



 code=1092)      ACCURATE AS CREATININE



       CLEARANCE IN PREDICTING



       GLOMERULAR FILTRATION



       RATE. ESTIMATED GFR IS



       NOT APPLICABLE FOR



       DIALYSIS PATIENTS.



CBC W/PLT COUNT &amp; AUTO [id number] 05:51:00





 Test Item  Value  Reference Range  Comments

 

 WHITE BLOOD CELL COUNT (BEAKER) (test code=775)  13.6 K/ L  3.5-10.5  

 

 RED BLOOD CELL COUNT (BEAKER) (test code=761)  3.24 M/ L  4.63-6.08  

 

 HEMOGLOBIN (BEAKER) (test code=410)  9.2 GM/DL  13.7-17.5  

 

 HEMATOCRIT (BEAKER) (test code=411)  27.7 %  40.1-51.0  

 

 MEAN CORPUSCULAR VOLUME (BEAKER) (test code=753)  85.5 fL  79.0-92.2  

 

 MEAN CORPUSCULAR HEMOGLOBIN (BEAKER) (test  28.4 pg  25.7-32.2  



 smhd=491)      

 

 MEAN CORPUSCULAR HEMOGLOBIN CONC (BEAKER) (test  33.2 GM/DL  32.3-36.5  



 code=752)      

 

 RED CELL DISTRIBUTION WIDTH (BEAKER) (test  13.8 %  11.6-14.4  



 code=412)      

 

 PLATELET COUNT (BEAKER) (test code=756)  404 K/CU MM  150-450  

 

 MEAN PLATELET VOLUME (BEAKER) (test code=754)  9.1 fL  9.4-12.4  

 

 NUCLEATED RED BLOOD CELLS (BEAKER) (test  0 /100 WBC  0-0  



 code=413)      

 

 NEUTROPHILS RELATIVE PERCENT (BEAKER) (test  91 %    



 code=429)      

 

 LYMPHOCYTES RELATIVE PERCENT (BEAKER) (test  8 %    



 code=430)      

 

 MONOCYTES RELATIVE PERCENT (BEAKER) (test  1 %    



 code=431)      

 

 EOSINOPHILS RELATIVE PERCENT (BEAKER) (test  0 %    



 code=432)      

 

 BASOPHILS RELATIVE PERCENT (BEAKER) (test  0 %    



 code=437)      

 

 NEUTROPHILS ABSOLUTE COUNT (BEAKER) (test  12.27 K/ L  1.78-5.38  



 code=670)      

 

 LYMPHOCYTES ABSOLUTE COUNT (BEAKER) (test  1.07 K/ L  1.32-3.57  



 code=414)      

 

 MONOCYTES ABSOLUTE COUNT (BEAKER) (test  0.09 K/ L  0.30-0.82  



 code=415)      

 

 EOSINOPHILS ABSOLUTE COUNT (BEAKER) (test  0.00 K/ L  0.04-0.54  



 code=416)      

 

 BASOPHILS ABSOLUTE COUNT (BEAKER) (test  0.01 K/ L  0.01-0.08  



 code=417)      

 

 IMMATURE GRANULOCYTES-RELATIVE PERCENT (BEAKER)  1 %  0-1  



 (test code=2801)      



[id number] 09:47:00





 Test Item  Value  Reference Range  Comments

 

 MAGNESIUM (BEAKER) (test  1.5 mg/dL  1.6-2.6  Specimen slightly hemolyzed



 code=627)      



[id number] 09:47:00





 Test Item  Value  Reference Range  Comments

 

 PHOSPHORUS (BEAKER) (test  2.5 mg/dL  2.3-4.7  Specimen slightly hemolyzed



 code=604)      



BASIC METABOLIC [id number] 09:47:00





 Test Item  Value  Reference Range  Comments

 

 SODIUM (BEAKER) (test  143 meq/L  136-145  



 mift=736)      

 

 POTASSIUM (BEAKER) (test  3.5 meq/L  3.5-5.1  Specimen slightly



 code=379)      hemolyzed

 

 CHLORIDE (BEAKER) (test  112 meq/L    



 code=382)      

 

 CO2 (BEAKER) (test  21 meq/L  22-29  



 code=355)      

 

 BLOOD UREA NITROGEN  10 mg/dL  7-21  



 (BEAKER) (test code=354)      

 

 CREATININE (BEAKER) (test  0.62 mg/dL  0.57-1.25  Specimen slightly



 code=358)      hemolyzed

 

 GLUCOSE RANDOM (BEAKER)  151 mg/dL    



 (test code=652)      

 

 CALCIUM (BEAKER) (test  8.5 mg/dL  8.4-10.2  



 code=697)      

 

 EGFR (BEAKER) (test  133 mL/min/1.73 sq m    ESTIMATED GFR IS NOT AS



 code=1092)      ACCURATE AS CREATININE



       CLEARANCE IN PREDICTING



       GLOMERULAR FILTRATION



       RATE. ESTIMATED GFR IS



       NOT APPLICABLE FOR



       DIALYSIS PATIENTS.



HEPATIC FUNCTION [id number] 09:47:00





 Test Item  Value  Reference Range  Comments

 

 TOTAL PROTEIN (BEAKER) (test  6.3 gm/dL  6.0-8.3  Specimen slightly hemolyzed



 code=770)      

 

 ALBUMIN (BEAKER) (test  2.6 g/dL  3.5-5.0  Specimen slightly hemolyzed



 code=1145)      

 

 BILIRUBIN TOTAL (BEAKER) (test  0.5 mg/dL  0.2-1.2  Specimen slightly hemolyzed



 code=377)      

 

 BILIRUBIN DIRECT (BEAKER) (test  0.4 mg/dL  0.1-0.5  Specimen slightly 
hemolyzed



 code=706)      

 

 ALKALINE PHOSPHATASE (BEAKER)  101 U/L    



 (test code=346)      

 

 AST (SGOT) (BEAKER) (test  23 U/L  5-34  Specimen slightly hemolyzed



 code=353)      

 

 ALT (SGPT) (BEAKER) (test  28 U/L  6-55  Specimen slightly hemolyzed



 code=347)      



URIC [id number] 09:47:00





 Test Item  Value  Reference Range  Comments

 

 URIC ACID (BEAKER) (test  1.8 mg/dL  2.6-7.2  Specimen slightly hemolyzed



 code=773)      



LACTATE DEHYDROGENASE (LDH)2019 09:47:00





 Test Item  Value  Reference Range  Comments

 

 LACTATE DEHYDROGENASE (BEAKER)  254 U/L  125-220  Specimen slightly hemolyzed



 (test code=635)      



CBC W/PLT COUNT &amp; AUTO [id number] 07:37:00





 Test Item  Value  Reference Range  Comments

 

 WHITE BLOOD CELL COUNT (BEAKER) (test code=775)  18.2 K/ L  3.5-10.5  

 

 RED BLOOD CELL COUNT (BEAKER) (test code=761)  3.45 M/ L  4.63-6.08  

 

 HEMOGLOBIN (BEAKER) (test code=410)  9.7 GM/DL  13.7-17.5  

 

 HEMATOCRIT (BEAKER) (test code=411)  29.7 %  40.1-51.0  

 

 MEAN CORPUSCULAR VOLUME (BEAKER) (test code=753)  86.1 fL  79.0-92.2  

 

 MEAN CORPUSCULAR HEMOGLOBIN (BEAKER) (test  28.1 pg  25.7-32.2  



 gyng=872)      

 

 MEAN CORPUSCULAR HEMOGLOBIN CONC (BEAKER) (test  32.7 GM/DL  32.3-36.5  



 code=752)      

 

 RED CELL DISTRIBUTION WIDTH (BEAKER) (test  13.8 %  11.6-14.4  



 code=412)      

 

 PLATELET COUNT (BEAKER) (test code=756)  482 K/CU MM  150-450  

 

 MEAN PLATELET VOLUME (BEAKER) (test code=754)  9.3 fL  9.4-12.4  

 

 NUCLEATED RED BLOOD CELLS (BEAKER) (test  0 /100 WBC  0-0  



 code=413)      

 

 NEUTROPHILS RELATIVE PERCENT (BEAKER) (test  89 %    



 code=429)      

 

 LYMPHOCYTES RELATIVE PERCENT (BEAKER) (test  8 %    



 code=430)      

 

 MONOCYTES RELATIVE PERCENT (BEAKER) (test  2 %    



 code=431)      

 

 EOSINOPHILS RELATIVE PERCENT (BEAKER) (test  0 %    



 code=432)      

 

 BASOPHILS RELATIVE PERCENT (BEAKER) (test  0 %    



 code=437)      

 

 NEUTROPHILS ABSOLUTE COUNT (BEAKER) (test  16.13 K/ L  1.78-5.38  



 code=670)      

 

 LYMPHOCYTES ABSOLUTE COUNT (BEAKER) (test  1.50 K/ L  1.32-3.57  



 code=414)      

 

 MONOCYTES ABSOLUTE COUNT (BEAKER) (test  0.39 K/ L  0.30-0.82  



 code=415)      

 

 EOSINOPHILS ABSOLUTE COUNT (BEAKER) (test  0.00 K/ L  0.04-0.54  



 code=416)      

 

 BASOPHILS ABSOLUTE COUNT (BEAKER) (test  0.02 K/ L  0.01-0.08  



 code=417)      

 

 IMMATURE GRANULOCYTES-RELATIVE PERCENT (BEAKER)  1 %  0-1  



 (test code=2801)      



URIC [id number] 05:37:00





 Test Item  Value  Reference Range  Comments

 

 URIC ACID (BEAKER) (test code=773)  1.9 mg/dL  2.6-7.2  



[id number] 05:26:00





 Test Item  Value  Reference Range  Comments

 

 MAGNESIUM (BEAKER) (test code=627)  1.6 mg/dL  1.6-2.6  



[id number] 05:26:00





 Test Item  Value  Reference Range  Comments

 

 PHOSPHORUS (BEAKER) (test code=604)  2.2 mg/dL  2.3-4.7  



BASIC METABOLIC [id number] 05:26:00





 Test Item  Value  Reference Range  Comments

 

 SODIUM (BEAKER) (test  141 meq/L  136-145  



 lhec=747)      

 

 POTASSIUM (BEAKER) (test  3.9 meq/L  3.5-5.1  



 code=379)      

 

 CHLORIDE (BEAKER) (test  111 meq/L    



 code=382)      

 

 CO2 (BEAKER) (test  22 meq/L  22-29  



 code=355)      

 

 BLOOD UREA NITROGEN  7 mg/dL  7-21  



 (BEAKER) (test code=354)      

 

 CREATININE (BEAKER) (test  0.65 mg/dL  0.57-1.25  



 code=358)      

 

 GLUCOSE RANDOM (BEAKER)  179 mg/dL    



 (test code=652)      

 

 CALCIUM (BEAKER) (test  8.7 mg/dL  8.4-10.2  



 code=697)      

 

 EGFR (BEAKER) (test  126 mL/min/1.73 sq m    ESTIMATED GFR IS NOT AS



 code=1092)      ACCURATE AS CREATININE



       CLEARANCE IN PREDICTING



       GLOMERULAR FILTRATION



       RATE. ESTIMATED GFR IS



       NOT APPLICABLE FOR



       DIALYSIS PATIENTS.



HEPATIC FUNCTION [id number] 05:26:00





 Test Item  Value  Reference Range  Comments

 

 TOTAL PROTEIN (BEAKER) (test code=770)  6.5 gm/dL  6.0-8.3  

 

 ALBUMIN (BEAKER) (test code=1145)  2.7 g/dL  3.5-5.0  

 

 BILIRUBIN TOTAL (BEAKER) (test code=377)  0.7 mg/dL  0.2-1.2  

 

 BILIRUBIN DIRECT (BEAKER) (test code=706)  0.5 mg/dL  0.1-0.5  

 

 ALKALINE PHOSPHATASE (BEAKER) (test code=346)  110 U/L    

 

 AST (SGOT) (BEAKER) (test code=353)  23 U/L  5-34  

 

 ALT (SGPT) (BEAKER) (test code=347)  30 U/L  6-55  



LACTATE DEHYDROGENASE (LDH)2019 05:26:00





 Test Item  Value  Reference Range  Comments

 

 LACTATE DEHYDROGENASE (BEAKER) (test code=635)  168 U/L  125-220  



CBC W/PLT COUNT &amp; AUTO [id number] 05:16:00





 Test Item  Value  Reference Range  Comments

 

 WHITE BLOOD CELL COUNT (BEAKER) (test code=775)  9.7 K/ L  3.5-10.5  

 

 RED BLOOD CELL COUNT (BEAKER) (test code=761)  3.53 M/ L  4.63-6.08  

 

 HEMOGLOBIN (BEAKER) (test code=410)  9.9 GM/DL  13.7-17.5  

 

 HEMATOCRIT (BEAKER) (test code=411)  30.5 %  40.1-51.0  

 

 MEAN CORPUSCULAR VOLUME (BEAKER) (test code=753)  86.4 fL  79.0-92.2  

 

 MEAN CORPUSCULAR HEMOGLOBIN (BEAKER) (test  28.0 pg  25.7-32.2  



 bhru=904)      

 

 MEAN CORPUSCULAR HEMOGLOBIN CONC (BEAKER) (test  32.5 GM/DL  32.3-36.5  



 code=752)      

 

 RED CELL DISTRIBUTION WIDTH (BEAKER) (test  13.6 %  11.6-14.4  



 code=412)      

 

 PLATELET COUNT (BEAKER) (test code=756)  428 K/CU MM  150-450  

 

 MEAN PLATELET VOLUME (BEAKER) (test code=754)  8.7 fL  9.4-12.4  

 

 NUCLEATED RED BLOOD CELLS (BEAKER) (test  0 /100 WBC  0-0  



 code=413)      

 

 NEUTROPHILS RELATIVE PERCENT (BEAKER) (test  83 %    



 code=429)      

 

 LYMPHOCYTES RELATIVE PERCENT (BEAKER) (test  15 %    



 code=430)      

 

 MONOCYTES RELATIVE PERCENT (BEAKER) (test  1 %    



 code=431)      

 

 EOSINOPHILS RELATIVE PERCENT (BEAKER) (test  0 %    



 code=432)      

 

 BASOPHILS RELATIVE PERCENT (BEAKER) (test  0 %    



 code=437)      

 

 NEUTROPHILS ABSOLUTE COUNT (BEAKER) (test  8.05 K/ L  1.78-5.38  



 code=670)      

 

 LYMPHOCYTES ABSOLUTE COUNT (BEAKER) (test  1.48 K/ L  1.32-3.57  



 code=414)      

 

 MONOCYTES ABSOLUTE COUNT (BEAKER) (test  0.08 K/ L  0.30-0.82  



 code=415)      

 

 EOSINOPHILS ABSOLUTE COUNT (BEAKER) (test  0.00 K/ L  0.04-0.54  



 code=416)      

 

 BASOPHILS ABSOLUTE COUNT (BEAKER) (test  0.02 K/ L  0.01-0.08  



 code=417)      

 

 IMMATURE GRANULOCYTES-RELATIVE PERCENT (BEAKER)  1 %  0-1  



 (test code=2801)      



FL, LUMBAR PUNCTURE, [id number] 15:29:00Reason for exam:-&gt;DLBCL, 12 mg 
MTX 50 hydrocortisoneFINAL REPORT PATIENT ID:   [id number] Procedure: Fluoroscopy 
guided lumbar puncture for intrathecal chemotherapy Radiologist: Shad Medrano M.D. CLINICAL HISTORY: DLBCL, 12 mg MTX 50 hydrocortisone DESCRIPTION OF 
PROCEDURE: After obtaining informed consent, the patient was prepped and draped 
on the fluoroscopy table following the usual sterile fashion for lumbar 
puncture. 1% lidocaine was administered for local anesthesia. Using 
fluoroscopic guidance, a 22-gauge spinal needle advanced into the thecal sac at 
the L3-L4 level. After verification of clear CSF return, the labeled syringe of 
methotrexate 12 mg and hydrocortisone 50 mg was hand injected into the thecal 
sac without difficulty. There were noperiprocedural complications. Fluoroscopy 
time: 0.1 minutes. Images: 1. Impression:  Successful fluoroscopy guided lumbar 
puncture for administration of intrathecal methotrexate and hydrocortisone. 
Signed: Shad Medrano MDReport Verified Date/Time:  2019 15:29:47 Reading 
Location: [facility] B1 C013V Neuro Reading Room      Electronically signed by: SHAD MEDRANO M.D. on 2019 03:29 PMANG, TUNNEL CATH CENTRAL INS W/PORT -[id number]
-04 12:32:00pls keep it accessed; for chemotherapyReason for exam:-&gt;
chemotherapyFINAL REPORT PATIENT ID:   [id number] Right internal jugular chest 
port insertion                                                                 
                       History: Lymphoma.                             Modality: 
Sonography and fluoroscopy. Sedation: Versed 1.0 mg and fentanyl 50 mcg given 
intravenously for conscious sedation.  Vital signs were monitored throughout 
the procedure by a nurse, and remained stable. Physician intra-service time was 
20 minutes.              :  Stephen Dreyer, MD Assistant:  
None.                                   Approach: Right internal jugular vein  
       Estimated blood loss:  &lt; 5 cc. Specimen: None.   Fluoroscopy Time: 
0.0 min.Reference AirKerma (Ka, r): 0.1 mGy. Technique: Informed written 
consent was obtained. Discussion of risks, benefits, and alternatives were made 
with the patient. The patient expressed understanding and agreed to proceed.  A 
universal timeout was performed prior to starting the procedure.  All elements 
maximal sterile barrier technique was utilized for this procedure, including 
utilization of sterile scrub solution for skin prep, a large sterile sheet to 
cover the areas of the patient that were not prepped, and hand hygiene, mask, 
head covering, and sterile gown for performing radiologist and scrub 
technologist. The skin was anesthetized with 2% lidocaine.  Ultrasound 
evaluation showed a patent and compressible right internal jugular vein, which 
was punctured under direct real-time ultrasound guidance with a micropuncture 
needle.  An ultrasound image was saved to PACS.    A 0.018 inch wire was placed 
throughthe needle into the right atrium. A 4 [place] micropuncture sheath was 
placed. A subcutaneous tunnel and pocket were created in the right anterior 
chest wall by blunt dissection.  The pocket was flushedwith antibiotic 
solution. A 6F Bard port was placed within the pocket and the catheter brought 
through the tunnel. The catheter was cut at 23 cm. A peel-away sheath was 
placed in the right IJ vein and the catheter was advanced through the sheath, 
with its distal tip terminating in the cavoatrial junction. The peel-away 
sheath was removed. The port was flushed and aspirated easily following 
placement. The skin incision was closed with 3-0 running subcuticular Monocryl 
and Steri-Strips.  The small jugular incision site was closed using Steri-
Strips.  The patient tolerated the procedure well and leftthe department in the 
same condition.               Results:  Spot radiograph of the chest 
demonstrates the new right IJ Port-A-Cath to lie in the expected position with 
its tip overlying the cavoatrial junction. Additionally, the patienthas a large 
right pleural effusion.                                                        
         Impression:                                     Successful, 
uncomplicated placement of a right internal jugularchest port. The port is 
ready for immediate use.  Incidental note of a large right pleural effusion.   
  Signed: Dreyer, Stephen MDReport Verified Date/Time:  2019 12:32:07 
Reading Location: [place] [address] Angio Body Reading Room     Electronically signed 
by: STEPHEN EDWARD DREYER on 2019 12:32 PMURIC [id number] 07:01:00





 Test Item  Value  Reference Range  Comments

 

 URIC ACID (BEAKER) (test code=773)  2.6 mg/dL  2.6-7.2  



[id number] 07:01:00





 Test Item  Value  Reference Range  Comments

 

 MAGNESIUM (BEAKER) (test code=627)  1.6 mg/dL  1.6-2.6  



[id number] 07:01:00





 Test Item  Value  Reference Range  Comments

 

 PHOSPHORUS (BEAKER) (test code=604)  3.2 mg/dL  2.3-4.7  



BASIC METABOLIC [id number] 07:01:00





 Test Item  Value  Reference Range  Comments

 

 SODIUM (BEAKER) (test  139 meq/L  136-145  



 wplq=788)      

 

 POTASSIUM (BEAKER) (test  3.8 meq/L  3.5-5.1  



 code=379)      

 

 CHLORIDE (BEAKER) (test  110 meq/L    



 code=382)      

 

 CO2 (BEAKER) (test  23 meq/L  22-29  



 code=355)      

 

 BLOOD UREA NITROGEN  9 mg/dL  7-21  



 (BEAKER) (test code=354)      

 

 CREATININE (BEAKER) (test  0.63 mg/dL  0.57-1.25  



 code=358)      

 

 GLUCOSE RANDOM (BEAKER)  93 mg/dL    



 (test code=652)      

 

 CALCIUM (BEAKER) (test  8.4 mg/dL  8.4-10.2  



 code=697)      

 

 EGFR (BEAKER) (test  130 mL/min/1.73 sq m    ESTIMATED GFR IS NOT AS



 code=1092)      ACCURATE AS CREATININE



       CLEARANCE IN PREDICTING



       GLOMERULAR FILTRATION



       RATE. ESTIMATED GFR IS



       NOT APPLICABLE FOR



       DIALYSIS PATIENTS.



HEPATIC FUNCTION [id number] 07:01:00





 Test Item  Value  Reference Range  Comments

 

 TOTAL PROTEIN (BEAKER) (test code=770)  6.3 gm/dL  6.0-8.3  

 

 ALBUMIN (BEAKER) (test code=1145)  2.6 g/dL  3.5-5.0  

 

 BILIRUBIN TOTAL (BEAKER) (test code=377)  0.6 mg/dL  0.2-1.2  

 

 BILIRUBIN DIRECT (BEAKER) (test code=706)  0.5 mg/dL  0.1-0.5  

 

 ALKALINE PHOSPHATASE (BEAKER) (test code=346)  113 U/L    

 

 AST (SGOT) (BEAKER) (test code=353)  32 U/L  5-34  

 

 ALT (SGPT) (BEAKER) (test code=347)  34 U/L  6-55  



LACTATE DEHYDROGENASE (LDH)2019 07:01:00





 Test Item  Value  Reference Range  Comments

 

 LACTATE DEHYDROGENASE (BEAKER) (test code=635)  204 U/L  125-220  



CBC W/PLT COUNT &amp; AUTO [id number] 06:56:00





 Test Item  Value  Reference Range  Comments

 

 WHITE BLOOD CELL COUNT (BEAKER) (test code=775)  9.7 K/ L  3.5-10.5  

 

 RED BLOOD CELL COUNT (BEAKER) (test code=761)  3.59 M/ L  4.63-6.08  

 

 HEMOGLOBIN (BEAKER) (test code=410)  10.0 GM/DL  13.7-17.5  

 

 HEMATOCRIT (BEAKER) (test code=411)  31.1 %  40.1-51.0  

 

 MEAN CORPUSCULAR VOLUME (BEAKER) (test code=753)  86.6 fL  79.0-92.2  

 

 MEAN CORPUSCULAR HEMOGLOBIN (BEAKER) (test  27.9 pg  25.7-32.2  



 fmvc=896)      

 

 MEAN CORPUSCULAR HEMOGLOBIN CONC (BEAKER) (test  32.2 GM/DL  32.3-36.5  



 code=752)      

 

 RED CELL DISTRIBUTION WIDTH (BEAKER) (test  13.8 %  11.6-14.4  



 code=412)      

 

 PLATELET COUNT (BEAKER) (test code=756)  504 K/CU MM  150-450  

 

 MEAN PLATELET VOLUME (BEAKER) (test code=754)  8.7 fL  9.4-12.4  

 

 NUCLEATED RED BLOOD CELLS (BEAKER) (test  0 /100 WBC  0-0  



 code=413)      

 

 NEUTROPHILS RELATIVE PERCENT (BEAKER) (test  58 %    



 code=429)      

 

 LYMPHOCYTES RELATIVE PERCENT (BEAKER) (test  29 %    



 code=430)      

 

 MONOCYTES RELATIVE PERCENT (BEAKER) (test  9 %    



 code=431)      

 

 EOSINOPHILS RELATIVE PERCENT (BEAKER) (test  2 %    



 code=432)      

 

 BASOPHILS RELATIVE PERCENT (BEAKER) (test  1 %    



 code=437)      

 

 NEUTROPHILS ABSOLUTE COUNT (BEAKER) (test  5.59 K/ L  1.78-5.38  



 code=670)      

 

 LYMPHOCYTES ABSOLUTE COUNT (BEAKER) (test  2.80 K/ L  1.32-3.57  



 code=414)      

 

 MONOCYTES ABSOLUTE COUNT (BEAKER) (test  0.88 K/ L  0.30-0.82  



 code=415)      

 

 EOSINOPHILS ABSOLUTE COUNT (BEAKER) (test  0.23 K/ L  0.04-0.54  



 code=416)      

 

 BASOPHILS ABSOLUTE COUNT (BEAKER) (test  0.09 K/ L  0.01-0.08  



 code=417)      

 

 IMMATURE GRANULOCYTES-RELATIVE PERCENT (BEAKER)  1 %  0-1  



 (test code=2801)      



RAD, CHEST, 2 [id number] 21:42:00Reason for exam:-&gt;h/o R pleural 
effusion s/p thoracentesis, concern for recurrent effusionFINAL REPORT PATIENT 
ID:   [id number] Exam: Chest x-ray, PA and lateral views Clinical History: 
History of right pleural effusion status post thoracentesis. Concern for 
recurrent effusion. Comparison: Chest radiograph 3/28/2019. Technique: Frontal 
and lateral views of the chest were obtained. Findings/impression:There is a 
moderate loculated right pleural effusion, not significantly changed in size. 
There is associated compressive atelectasis. The left lung is grossly clear. 
There is no pneumothorax.The heart is normal in size. The aorta is uncoiled. 
There is a partially imaged catheter overlying theright upper quadrant. Signed: 
Greta Mukherjee MDReport Verified Date/Time:  2019 21:42:29 Reading Location
: [facility] B1 C013Y CT Body Reading Room    Electronically signed by: GRETA MUKHERJEE MD on 2019 09:42 PMLACTATE DEHYDROGENASE (LDH)2019 12:34:00





 Test Item  Value  Reference Range  Comments

 

 LACTATE DEHYDROGENASE (BEAKER)  493 U/L  125-220  Specimen slightly hemolyzed



 (test code=635)      



URIC [id number] 12:33:00





 Test Item  Value  Reference Range  Comments

 

 URIC ACID (BEAKER) (test  3.2 mg/dL  2.6-7.2  Specimen markedly hemolyzed



 code=773)      



COMPREHENSIVE METABOLIC [id number] 12:33:00





 Test Item  Value  Reference Range  Comments

 

 TOTAL PROTEIN (BEAKER)  7.5 gm/dL  6.0-8.3  Specimen markedly



 (test code=770)      hemolyzed

 

 ALBUMIN (BEAKER) (test  2.7 g/dL  3.5-5.0  Specimen markedly



 code=1145)      hemolyzed

 

 ALKALINE PHOSPHATASE  121 U/L    



 (BEAKER) (test code=346)      

 

 BILIRUBIN TOTAL (BEAKER)  0.7 mg/dL  0.2-1.2  Specimen markedly



 (test code=377)      hemolyzed

 

 SODIUM (BEAKER) (test  136 meq/L  136-145  



 yawe=140)      

 

 POTASSIUM (BEAKER) (test  4.5 meq/L  3.5-5.1  Specimen markedly



 code=379)      hemolyzed

 

 CHLORIDE (BEAKER) (test  107 meq/L    



 code=382)      

 

 CO2 (BEAKER) (test  21 meq/L  22-29  



 code=355)      

 

 BLOOD UREA NITROGEN  12 mg/dL  7-21  



 (BEAKER) (test code=354)      

 

 CREATININE (BEAKER) (test  0.70 mg/dL  0.57-1.25  Specimen markedly



 code=358)      hemolyzed

 

 GLUCOSE RANDOM (BEAKER)  117 mg/dL    



 (test code=652)      

 

 CALCIUM (BEAKER) (test  8.9 mg/dL  8.4-10.2  



 code=697)      

 

 AST (SGOT) (BEAKER) (test  53 U/L  5-34  Specimen markedly



 code=353)      hemolyzed

 

 ALT (SGPT) (BEAKER) (test  43 U/L  6-55  Specimen markedly



 code=347)      hemolyzed

 

 EGFR (BEAKER) (test  115 mL/min/1.73 sq    ESTIMATED GFR IS NOT AS



 code=1092)  m    ACCURATE AS CREATININE



       CLEARANCE IN PREDICTING



       GLOMERULAR FILTRATION



       RATE. ESTIMATED GFR IS



       NOT APPLICABLE FOR



       DIALYSIS PATIENTS.



CBC W/PLT COUNT &amp; AUTO [id number] 12:19:00





 Test Item  Value  Reference Range  Comments

 

 WHITE BLOOD CELL COUNT (BEAKER) (test code=775)  10.3 K/ L  3.5-10.5  

 

 RED BLOOD CELL COUNT (BEAKER) (test code=761)  3.89 M/ L  4.63-6.08  

 

 HEMOGLOBIN (BEAKER) (test code=410)  10.9 GM/DL  13.7-17.5  

 

 HEMATOCRIT (BEAKER) (test code=411)  34.8 %  40.1-51.0  

 

 MEAN CORPUSCULAR VOLUME (BEAKER) (test code=753)  89.5 fL  79.0-92.2  

 

 MEAN CORPUSCULAR HEMOGLOBIN (BEAKER) (test  28.0 pg  25.7-32.2  



 qxwa=818)      

 

 MEAN CORPUSCULAR HEMOGLOBIN CONC (BEAKER) (test  31.3 GM/DL  32.3-36.5  



 code=752)      

 

 RED CELL DISTRIBUTION WIDTH (BEAKER) (test  14.2 %  11.6-14.4  



 code=412)      

 

 PLATELET COUNT (BEAKER) (test code=756)  559 K/CU MM  150-450  

 

 MEAN PLATELET VOLUME (BEAKER) (test code=754)  8.7 fL  9.4-12.4  

 

 NUCLEATED RED BLOOD CELLS (BEAKER) (test  0 /100 WBC  0-0  



 code=413)      

 

 NEUTROPHILS RELATIVE PERCENT (BEAKER) (test  62 %    



 code=429)      

 

 LYMPHOCYTES RELATIVE PERCENT (BEAKER) (test  25 %    



 code=430)      

 

 MONOCYTES RELATIVE PERCENT (BEAKER) (test  9 %    



 code=431)      

 

 EOSINOPHILS RELATIVE PERCENT (BEAKER) (test  2 %    



 code=432)      

 

 BASOPHILS RELATIVE PERCENT (BEAKER) (test  1 %    



 code=437)      

 

 NEUTROPHILS ABSOLUTE COUNT (BEAKER) (test  6.45 K/ L  1.78-5.38  



 code=670)      

 

 LYMPHOCYTES ABSOLUTE COUNT (BEAKER) (test  2.54 K/ L  1.32-3.57  



 code=414)      

 

 MONOCYTES ABSOLUTE COUNT (BEAKER) (test  0.91 K/ L  0.30-0.82  



 code=415)      

 

 EOSINOPHILS ABSOLUTE COUNT (BEAKER) (test  0.20 K/ L  0.04-0.54  



 code=416)      

 

 BASOPHILS ABSOLUTE COUNT (BEAKER) (test  0.10 K/ L  0.01-0.08  



 code=417)      

 

 IMMATURE GRANULOCYTES-RELATIVE PERCENT (BEAKER)  1 %  0-1  



 (test code=2801)      



[id number] 12:09:00





 Test Item  Value  Reference Range  Comments

 

 PROTIME (BEAKER) (test code=759)  14.5 seconds  11.7-14.7  

 

 INR (BEAKER) (test code=370)  1.1  <=5.9  

 

 PARTIAL THROMBOPLASTIN TIME (BEAKER) (test  33.7 seconds  22.5-36.0  



 code=760)      



RECOMMENDED COUMADIN/WARFARIN INR THERAPY RANGESSTANDARD DOSE: 2.0 - 3.0   
Includes: PROPHYLAXIS forvenous thrombosis, systemic embolization; TREATMENT 
for venous thrombosis and/or pulmonary embolus.HIGH RISK: Target INR is 2.5-3.5 
for patients with mechanical heart valves.RAD, CHEST, 1 VIEW, NON [id number]
[id number] 19:41:00Reason for exam:-&gt;s/p thoracenthesisShould this be performed at 
the bedside?-&gt;YesFINAL REPORT PATIENT ID:   [id number] Postthoracentesis chest 
dated 3/28/2019 Comment:  Post thoracentesis chest demonstrates no 
pneumothorax.Heart is normal in size. Small to moderate residual pleural 
effusion is present.  Impression: No pneumothorax on the post thoracentesis 
chest. Signed: Pily BalbuenaMDReport Verified Date/Time:  2019 19:41:50 
Reading Location: [address] Consult Reading Room      Electronically signed 
by: PILY BALBUENA M.D. on 2019 07:41 PMU/S, [id number] 17:04:
00Laterality?-&gt;RightReason for Exam:-&gt;Please perform thoracentesis of 
right lungFINAL REPORT PATIENT ID:   [id number] Exam:  Ultrasound guided 
thoracentesis Clinical History:  Pleural effusion Consent:  Benefits and risks 
were explained to the patient who gave consent to the procedure. Complication:  
None immediate Procedure:  The patient was placed in uprightposition. The right 
posterior chest was prepped and draped in usual sterile fashion. 1% lidocaine 
was used as local anesthetic. Under ultrasound guidance, a thoracentesis 
catheter was inserted into the pleural cavity. Approximately 1400 cc of clear 
yellow fluid was aspirated. The catheter was removed. The specimen was sent to 
the laboratory for further analysis. The patient tolerated the procedure well 
without any adverse reaction. A STAT chest x-ray was ordered. The patient left 
the department in stable condition. Impression:  Ultrasound guided right 
thoracentesis. Signed: Myesha Paniagua MDReport Verified Date/Time:  2019 
17:04:04 Reading Location: [address] Ultrasound Reading Room      
Electronically signed by: MYESHA PANIAGUA M.D. on 2019 05:04 [id number]
[id number] 13:58:00





 Test Item  Value  Reference Range  Comments

 

 PARTIAL THROMBOPLASTIN TIME (BEAKER) (test  33.1 seconds  22.5-36.0  



 code=760)      



PROTHROMBIN [id number] 13:57:00





 Test Item  Value  Reference Range  Comments

 

 PROTIME (BEAKER) (test code=759)  15.2 seconds  11.7-14.7  

 

 INR (BEAKER) (test code=370)  1.2  <=5.9  



RECOMMENDED COUMADIN/WARFARIN INR THERAPY RANGESSTANDARD DOSE: 2.0 - 3.0   
Includes: PROPHYLAXIS forvenous thrombosis, systemic embolization; TREATMENT 
for venous thrombosis and/or pulmonary embolus.HIGH RISK: Target INR is 2.5-3.5 
for patients with mechanical heart valves.RAD, CHEST, 2 [id number] 13:15:
00Pleural effusion DLBCLReason for Exam:-&gt;Diffuse large B-cell lymphoma of 
intra abdominal lymph nodesFINAL REPORT PATIENT ID:   [id number]  EXAMINATION: PA 
and lateral views of the chest. COMPARISON: 2019 CLINICAL HISTORY:    
      DISCUSSION: Lines/tubes:  None. Lungs:  Right lung atelectasis.Underlying 
additional pathology may be obscured. Pleura:  Increased large right effusion. 
Heart and mediastinum:  The cardiomediastinal silhouette is normal. Bones and 
soft tissues:  No acute bony abnormalities.      IMPRESSION: Increased large 
right pleural effusion with adjacent atelectasis. Additional underlying 
pathology may be obscured. Signed: Palisch, Andrew MDReport Verified Date/Time:
  2019 13:15:05         Electronically signed by: Andrew R Palisch on  01:15 PMCBC W/PLT COUNT &amp; AUTO [id number] 11:15:00





 Test Item  Value  Reference Range  Comments

 

 WHITE BLOOD CELL COUNT (BEAKER) (test code=775)  25.5 K/ L  3.5-10.5  

 

 RED BLOOD CELL COUNT (BEAKER) (test code=761)  4.07 M/ L  4.63-6.08  

 

 HEMOGLOBIN (BEAKER) (test code=410)  12.0 GM/DL  13.7-17.5  

 

 HEMATOCRIT (BEAKER) (test code=411)  35.8 %  40.1-51.0  

 

 MEAN CORPUSCULAR VOLUME (BEAKER) (test code=753)  88.0 fL  79.0-92.2  

 

 MEAN CORPUSCULAR HEMOGLOBIN (BEAKER) (test  29.5 pg  25.7-32.2  



 wftz=620)      

 

 MEAN CORPUSCULAR HEMOGLOBIN CONC (BEAKER) (test  33.5 GM/DL  32.3-36.5  



 code=752)      

 

 RED CELL DISTRIBUTION WIDTH (BEAKER) (test  14.0 %  11.6-14.4  



 code=412)      

 

 PLATELET COUNT (BEAKER) (test code=756)  325 K/CU MM  150-450  

 

 MEAN PLATELET VOLUME (BEAKER) (test code=754)  9.3 fL  9.4-12.4  

 

 NUCLEATED RED BLOOD CELLS (BEAKER) (test  0 /100 WBC  0-0  



 code=413)      



(CELLAVISION MANUAL DIFF)2019 11:15:00





 Test Item  Value  Reference Range  Comments

 

 NEUTROPHILS - REL (CELLAVISION)(BEAKER) (test  96 %    



 code=2816)      

 

 LYMPHOCYTES - REL (CELLAVISION)(BEAKER) (test  3 %    



 code=2817)      

 

 EOSINOPHILS - REL (CELLAVISION)(BEAKER) (test  1 %    



 code=2819)      

 

 NEUTROPHILS - ABS (CELLAVISION)(BEAKER) (test  24.48 K/ul  1.78-5.38  



 code=2830)      

 

 LYMPHOCYTES - ABS (CELLAVISION)(BEAKER) (test  0.77 K/ul  1.32-3.57  



 code=2831)      

 

 EOSINOPHILS - ABS (CELLAVISION)(BEAKER) (test  0.26 K/uL  0.04-0.54  



 code=2834)      

 

 TOTAL COUNTED (BEAKER) (test code=1351)  100    

 

 RBC MORPHOLOGY (BEAKER) (test code=762)  Normal    

 

 WBC MORPHOLOGY (BEAKER) (test code=487)  Normal    

 

 LARGE PLT(BEAKER) (test code=2156)  Present    

 

 ARTIFACT (CELLAVISION)(BEAKER) (test code=3432)  Present    

 

 PLATELET CONCENTRATION (CELLAVISION)(BEAKER)  Adequate    



 (test code=3438)      



Received comment: User comments: Slide comments:CALCIUM, [id number] 11:07
:00





 Test Item  Value  Reference Range  Comments

 

 CALCIUM IONIZED (BEAKER) (test code=698)  1.01 mmol/L  1.12-1.27  

 

 PH, BLOOD (BEAKER) (test code=1810)  7.44    



[id number] 07:51:00





 Test Item  Value  Reference Range  Comments

 

 PHOSPHORUS (BEAKER) (test code=604)  1.5 mg/dL  2.3-4.7  



BASIC METABOLIC [id number] 07:44:00





 Test Item  Value  Reference Range  Comments

 

 SODIUM (BEAKER) (test  138 meq/L  136-145  



 mide=208)      

 

 POTASSIUM (BEAKER) (test  3.3 meq/L  3.5-5.1  



 code=379)      

 

 CHLORIDE (BEAKER) (test  108 meq/L    



 code=382)      

 

 CO2 (BEAKER) (test  23 meq/L  22-29  



 code=355)      

 

 BLOOD UREA NITROGEN  13 mg/dL  7-21  



 (BEAKER) (test code=354)      

 

 CREATININE (BEAKER) (test  0.60 mg/dL  0.57-1.25  



 code=358)      

 

 GLUCOSE RANDOM (BEAKER)  100 mg/dL    



 (test code=652)      

 

 CALCIUM (BEAKER) (test  7.9 mg/dL  8.4-10.2  



 code=697)      

 

 EGFR (BEAKER) (test  138 mL/min/1.73 sq m    ESTIMATED GFR IS NOT AS



 code=1092)      ACCURATE AS CREATININE



       CLEARANCE IN PREDICTING



       GLOMERULAR FILTRATION



       RATE. ESTIMATED GFR IS



       NOT APPLICABLE FOR



       DIALYSIS PATIENTS.



[id number] 07:33:00





 Test Item  Value  Reference Range  Comments

 

 MAGNESIUM (BEAKER) (test code=627)  1.7 mg/dL  1.6-2.6  



URIC [id number] 07:33:00





 Test Item  Value  Reference Range  Comments

 

 URIC ACID (BEAKER) (test code=773)  2.5 mg/dL  2.6-7.2  



BASIC METABOLIC [id number] 19:38:00





 Test Item  Value  Reference Range  Comments

 

 SODIUM (BEAKER) (test  141 meq/L  136-145  



 bhgh=598)      

 

 POTASSIUM (BEAKER) (test  2.8 meq/L  3.5-5.1  



 code=379)      

 

 CHLORIDE (BEAKER) (test  112 meq/L    



 code=382)      

 

 CO2 (BEAKER) (test  22 meq/L  22-29  



 code=355)      

 

 BLOOD UREA NITROGEN  15 mg/dL  7-21  



 (BEAKER) (test code=354)      

 

 CREATININE (BEAKER) (test  0.57 mg/dL  0.57-1.25  



 code=358)      

 

 GLUCOSE RANDOM (BEAKER)  147 mg/dL    



 (test code=652)      

 

 CALCIUM (BEAKER) (test  7.1 mg/dL  8.4-10.2  



 code=697)      

 

 EGFR (BEAKER) (test  146 mL/min/1.73 sq m    ESTIMATED GFR IS NOT AS



 code=1092)      ACCURATE AS CREATININE



       CLEARANCE IN PREDICTING



       GLOMERULAR FILTRATION



       RATE. ESTIMATED GFR IS



       NOT APPLICABLE FOR



       DIALYSIS PATIENTS.



ANG, CHOLANGIOGRAM, [id number] 18:03:00Reason for exam:-&gt;Flush PTCAFINAL 
REPORT PATIENT ID:   [id number] History: Lymphoma, biliary obstruction, 
malfunctioning right internal/external biliary drainage catheter. PROCEDURE: 
Following informed written consent, the patient's existing right internal/
external biliary drainage catheter and surrounding skin site were prepped and 
draped in the usual sterile manner. 2% lidocaine was given locally for 
anesthesia. No conscious sedation was administered. Vital signs were monitored 
by the attending radiologist and a registered nurse during the procedure for 
approximately 20 minutes and remained stable. Cholangiogram was first performed 
through the pre-existing catheter. The catheter was then cut and removed over a 
wire. A Berenstein catheter and 035 angled Glidewire were used to traverse the 
common bile duct and reselect the duodenum. An Amplatz wire was placed through 
the Berenstein catheter into the proximal small bowel. A new 8 [place] internal/
external biliary drainage catheter was placed over the wire, through the 
existing access tract and into position with its distal tip coiled in the small 
bowel under fluoroscopic control. The new catheter was injected with contrast 
to confirm its position. The catheter was secured to the skin using 2-0 silk 
suture and capped to internal drainage. Overall, the patient tolerated the 
procedure well without immediate complications and was discharged from the 
department in stable condition. FINDINGS: Multiple images obtained during the 
procedure show the patient's pre-existing right internal/external biliary 
drainage catheter to lie in an atypical position. The catheter been partially 
withdrawn with its distal tip in the region of the common duct above the 
ampulla. Summary sideholeslie outside the liver within the peritoneal cavity. 
There appears to be moderate amount of ascites present surrounding the liver. 
Following replacement of the catheter, the new catheter lies in expected 
position with its distal tip coiled in the duodenal lumen. Injected contrast is 
seen in the common bile duct as well as the cystic duct and gallbladder. 
Contrast flows through the catheter into the small bowel without evidence for 
obstruction. IMPRESSION: 1. Successful uncomplicated replacement of the patient'
s malpositioned right internal/external biliary drainage catheter as described 
in detail above.   Total fluoroscopy time: 4.0 minutes. Estimated total patient 
dose reported as (Ka,r): 150 mGy Signed: Renetta Andreeport Verified Date/
Time:  2019 18:03:48 Reading Location: [address] Body Reading Room
      Electronically signed by: RENETTA ANDRE M.D. on 2019 06:03 PMFL, 
UGI, WITH SMALL [id number] 17:06:00Reason for exam:-&gt;SBO, nauseaFINAL 
REPORT PATIENT ID:   [id number] INDICATION:Nausea and abdominal pain. Evaluate 
for small bowel obstruction. COMPARISON: Abdomen radiograph 2019 
TECHNIQUE: Small bowel follow-through.Fluoroscopy time 0.4 minutes.Fluoroscopic 
images 11. FINDINGS: radiograph demonstrates a internal/external biliary 
catheter and an internal biliary catheter. Residual contrast in the colon 
noted. The patient was administered thin barium by mouth. Serial radiographs 
were performed. At 20 minutes contrastwas in the stomach and duodenum. At 40 
minutes contrast passed into the jejunum and then into the proximal ileum at 
one hour 30 minutes. At two hours small bowel loops in the pelvis opacified. At 
two hours and 30 minutes contrast progressed in the small bowel and 
demonstrated an absence of bowel in the central abdomen. This is because of the 
patient's known mesenteric mass as demonstrated on CT 2019. At 
three hours and 30 minutes nearly all the contrast was out of the stomach and 
proximal small bowel and in the distal small bowel and proximal right colon. 
IMPRESSION:No small bowel obstruction. Signed: Isrrael Galicia MDReport 
Verified Date/Time:  2019 17:06:13 Reading Location:[address] 
Consult Reading Room Electronically signed by: ISRRAEL GALICIA M.D. on 
2019 05:06 PMCBC W/PLT COUNT &amp; AUTO [id number] 07:41:00





 Test Item  Value  Reference Range  Comments

 

 WHITE BLOOD CELL COUNT (BEAKER) (test code=775)  58.7 K/ L  3.5-10.5  

 

 RED BLOOD CELL COUNT (BEAKER) (test code=761)  4.53 M/ L  4.63-6.08  

 

 HEMOGLOBIN (BEAKER) (test code=410)  13.4 GM/DL  13.7-17.5  

 

 HEMATOCRIT (BEAKER) (test code=411)  40.0 %  40.1-51.0  

 

 MEAN CORPUSCULAR VOLUME (BEAKER) (test code=753)  88.3 fL  79.0-92.2  

 

 MEAN CORPUSCULAR HEMOGLOBIN (BEAKER) (test  29.6 pg  25.7-32.2  



 amks=164)      

 

 MEAN CORPUSCULAR HEMOGLOBIN CONC (BEAKER) (test  33.5 GM/DL  32.3-36.5  



 code=752)      

 

 RED CELL DISTRIBUTION WIDTH (BEAKER) (test  13.9 %  11.6-14.4  



 code=412)      

 

 PLATELET COUNT (BEAKER) (test code=756)  530 K/CU MM  150-450  

 

 MEAN PLATELET VOLUME (BEAKER) (test code=754)  9.5 fL  9.4-12.4  

 

 NUCLEATED RED BLOOD CELLS (BEAKER) (test  0 /100 WBC  0-0  



 code=413)      



(CELLAVISION MANUAL DIFF)2019 07:41:00





 Test Item  Value  Reference Range  Comments

 

 NEUTROPHILS - REL (CELLAVISION)(BEAKER) (test  93 %    



 code=2816)      

 

 LYMPHOCYTES - REL (CELLAVISION)(BEAKER) (test  2 %    



 code=2817)      

 

 MONOCYTES - REL (CELLAVISION)(BEAKER) (test  1 %    



 code=2818)      

 

 BANDS - REL (CELLAVISION)(BEAKER) (test  4 %  0-10  



 code=2826)      

 

 NEUTROPHILS - ABS (CELLAVISION)(BEAKER) (test  54.59 K/ul  1.78-5.38  



 code=2830)      

 

 LYMPHOCYTES - ABS (CELLAVISION)(BEAKER) (test  1.17 K/ul  1.32-3.57  



 code=2831)      

 

 MONOCYTES - ABS (CELLAVISION)(BEAKER) (test  0.59 K/uL  0.30-0.82  



 code=2832)      

 

 BANDS - ABS (CELLAVISION)(BEAKER) (test  2.35 K/uL  0.00-0.80  



 code=2840)      

 

 TOTAL COUNTED (BEAKER) (test code=1351)  100    

 

 RBC MORPHOLOGY (BEAKER) (test code=762)  Normal    

 

 WBC MORPHOLOGY (BEAKER) (test code=487)  Normal    

 

 PLT MORPHOLOGY (BEAKER) (test code=486)  Normal    

 

 ARTIFACT (CELLAVISION)(BEAKER) (test code=3432)  Present    

 

 PLATELET CONCENTRATION (CELLAVISION)(BEAKER)  Increased    



 (test code=3438)      



Received comment: User comments: Slide comments:URIC [id number] 05:13:00





 Test Item  Value  Reference Range  Comments

 

 URIC ACID (BEAKER) (test code=773)  2.1 mg/dL  2.6-7.2  



[id number] 05:13:00





 Test Item  Value  Reference Range  Comments

 

 MAGNESIUM (BEAKER) (test code=627)  1.9 mg/dL  1.6-2.6  



[id number] 05:13:00





 Test Item  Value  Reference Range  Comments

 

 PHOSPHORUS (BEAKER) (test code=604)  2.3 mg/dL  2.3-4.7  



COMPREHENSIVE METABOLIC [id number] 05:13:00





 Test Item  Value  Reference Range  Comments

 

 TOTAL PROTEIN (BEAKER)  5.0 gm/dL  6.0-8.3  



 (test code=770)      

 

 ALBUMIN (BEAKER) (test  2.6 g/dL  3.5-5.0  



 code=1145)      

 

 ALKALINE PHOSPHATASE  189 U/L    



 (BEAKER) (test code=346)      

 

 BILIRUBIN TOTAL (BEAKER)  2.4 mg/dL  0.2-1.2  



 (test code=377)      

 

 SODIUM (BEAKER) (test  142 meq/L  136-145  



 rtks=769)      

 

 POTASSIUM (BEAKER) (test  3.0 meq/L  3.5-5.1  



 code=379)      

 

 CHLORIDE (BEAKER) (test  109 meq/L    



 code=382)      

 

 CO2 (BEAKER) (test  23 meq/L  22-29  



 code=355)      

 

 BLOOD UREA NITROGEN  18 mg/dL  7-21  



 (BEAKER) (test code=354)      

 

 CREATININE (BEAKER) (test  0.65 mg/dL  0.57-1.25  



 code=358)      

 

 GLUCOSE RANDOM (BEAKER)  108 mg/dL    



 (test code=652)      

 

 CALCIUM (BEAKER) (test  8.0 mg/dL  8.4-10.2  



 code=697)      

 

 AST (SGOT) (BEAKER) (test  37 U/L  5-34  



 code=353)      

 

 ALT (SGPT) (BEAKER) (test  73 U/L  6-55  



 code=347)      

 

 EGFR (BEAKER) (test  126 mL/min/1.73 sq    ESTIMATED GFR IS NOT AS



 code=1092)  m    ACCURATE AS CREATININE



       CLEARANCE IN PREDICTING



       GLOMERULAR FILTRATION



       RATE. ESTIMATED GFR IS



       NOT APPLICABLE FOR



       DIALYSIS PATIENTS.



BILIRUBIN, [id number] 05:13:00





 Test Item  Value  Reference Range  Comments

 

 BILIRUBIN DIRECT (BEAKER) (test code=706)  1.6 mg/dL  0.1-0.5  



LACTATE DEHYDROGENASE (LDH)2019 05:13:00





 Test Item  Value  Reference Range  Comments

 

 LACTATE DEHYDROGENASE (BEAKER) (test code=635)  318 U/L  125-220  



CBC W/PLT COUNT &amp; AUTO [id number] 07:50:00





 Test Item  Value  Reference Range  Comments

 

 WHITE BLOOD CELL COUNT (BEAKER) (test code=775)  42.2 K/ L  3.5-10.5  

 

 RED BLOOD CELL COUNT (BEAKER) (test code=761)  4.51 M/ L  4.63-6.08  

 

 HEMOGLOBIN (BEAKER) (test code=410)  13.1 GM/DL  13.7-17.5  

 

 HEMATOCRIT (BEAKER) (test code=411)  39.1 %  40.1-51.0  

 

 MEAN CORPUSCULAR VOLUME (BEAKER) (test code=753)  86.7 fL  79.0-92.2  

 

 MEAN CORPUSCULAR HEMOGLOBIN (BEAKER) (test  29.0 pg  25.7-32.2  



 ijvz=833)      

 

 MEAN CORPUSCULAR HEMOGLOBIN CONC (BEAKER) (test  33.5 GM/DL  32.3-36.5  



 code=752)      

 

 RED CELL DISTRIBUTION WIDTH (BEAKER) (test  13.6 %  11.6-14.4  



 code=412)      

 

 PLATELET COUNT (BEAKER) (test code=756)  598 K/CU MM  150-450  

 

 MEAN PLATELET VOLUME (BEAKER) (test code=754)  9.4 fL  9.4-12.4  

 

 NUCLEATED RED BLOOD CELLS (BEAKER) (test  0 /100 WBC  0-0  



 code=413)      



(CELLAVISION MANUAL DIFF)2019 07:50:00





 Test Item  Value  Reference Range  Comments

 

 NEUTROPHILS - REL (CELLAVISION)(BEAKER) (test  94 %    



 code=2816)      

 

 LYMPHOCYTES - REL (CELLAVISION)(BEAKER) (test  1 %    



 code=2817)      

 

 MONOCYTES - REL (CELLAVISION)(BEAKER) (test  1 %    



 code=2818)      

 

 BANDS - REL (CELLAVISION)(BEAKER) (test  4 %  0-10  



 code=2826)      

 

 NEUTROPHILS - ABS (CELLAVISION)(BEAKER) (test  39.67 K/ul  1.78-5.38  



 code=2830)      

 

 LYMPHOCYTES - ABS (CELLAVISION)(BEAKER) (test  0.42 K/ul  1.32-3.57  



 code=2831)      

 

 MONOCYTES - ABS (CELLAVISION)(BEAKER) (test  0.42 K/uL  0.30-0.82  



 code=2832)      

 

 BANDS - ABS (CELLAVISION)(BEAKER) (test  1.69 K/uL  0.00-0.80  



 code=2840)      

 

 TOTAL COUNTED (BEAKER) (test code=1351)  100    

 

 RBC MORPHOLOGY (BEAKER) (test code=762)  Normal    

 

 GIANT PLATELETS (BEAKER) (test code=313)  Present    

 

 TOXIC GRANULATION (BEAKER) (test code=771)  Present    

 

 PLATELET CONCENTRATION (CELLAVISION)(BEAKER)  Increased    



 (test code=3438)      



Received comment: User comments: Slide comments:URIC [id number] 05:02:00





 Test Item  Value  Reference Range  Comments

 

 URIC ACID (BEAKER) (test code=773)  1.6 mg/dL  2.6-7.2  



Specimen slightly [id number] 04:59:00





 Test Item  Value  Reference Range  Comments

 

 PHOSPHORUS (BEAKER) (test code=604)  3.4 mg/dL  2.3-4.7  



COMPREHENSIVE METABOLIC [id number] 04:59:00





 Test Item  Value  Reference Range  Comments

 

 TOTAL PROTEIN (BEAKER)  5.8 gm/dL  6.0-8.3  



 (test code=770)      

 

 ALBUMIN (BEAKER) (test  2.9 g/dL  3.5-5.0  



 code=1145)      

 

 ALKALINE PHOSPHATASE  201 U/L    



 (BEAKER) (test code=346)      

 

 BILIRUBIN TOTAL (BEAKER)  2.9 mg/dL  0.2-1.2  



 (test code=377)      

 

 SODIUM (BEAKER) (test  138 meq/L  136-145  



 imqp=046)      

 

 POTASSIUM (BEAKER) (test  3.4 meq/L  3.5-5.1  



 code=379)      

 

 CHLORIDE (BEAKER) (test  104 meq/L    



 code=382)      

 

 CO2 (BEAKER) (test  24 meq/L  22-29  



 code=355)      

 

 BLOOD UREA NITROGEN  16 mg/dL  7-21  



 (BEAKER) (test code=354)      

 

 CREATININE (BEAKER) (test  0.68 mg/dL  0.57-1.25  



 code=358)      

 

 GLUCOSE RANDOM (BEAKER)  137 mg/dL    



 (test code=652)      

 

 CALCIUM (BEAKER) (test  8.4 mg/dL  8.4-10.2  



 code=697)      

 

 AST (SGOT) (BEAKER) (test  46 U/L  5-34  



 code=353)      

 

 ALT (SGPT) (BEAKER) (test  83 U/L  6-55  



 code=347)      

 

 EGFR (BEAKER) (test  119 mL/min/1.73 sq    ESTIMATED GFR IS NOT AS



 code=1092)  m    ACCURATE AS CREATININE



       CLEARANCE IN PREDICTING



       GLOMERULAR FILTRATION



       RATE. ESTIMATED GFR IS



       NOT APPLICABLE FOR



       DIALYSIS PATIENTS.



Specimen slightly ictericLACTATE DEHYDROGENASE (LDH)2019 04:59:00





 Test Item  Value  Reference Range  Comments

 

 LACTATE DEHYDROGENASE (BEAKER) (test code=635)  284 U/L  125-220  



[id number] 17:56:00Medical Cytology Report                           
Case: [id number]                                 Authorizing Provider:  Tomeka Mullen MD Collected:           2019 1401            Ordering 
Location:     [address]     Received:            2019 0918  
            SERVICE                                                            
        Pathologist:           Saray Caruso MD             
                                                            Specimen:    CSF   
                                                                             
CEREBROSPINAL FLUID (CYTOSPINS):   - NEGATIVE FOR EPITHELIAL MALIGNANCY       
Signing Pathologist Direct Phone Line: [phone] signed by 
Saray Caruso MD on 3/11/2019 at  5:56 PM88108High-gradeB-cell 
lymphoma CEREBROSPINAL FLUIDPrepared 2 cytospins from 6 ml colorless 
fluidCollected: [id number]: [id number] [facility], Department of Pathology, [address] [place] [address], Tel [phone]
[phone] [facility], [facility] of [facility], [address] [place] [address], Tel [phone] Angeles General Medical Center, Department ofPathology, [address] [place] [address], Tel [phone]
[phone] [id number] 14:16:00Surgical Pathology Report                
         Case: [id number]                                 Authorizing Provider:  
Chuck Melgoza MD    Collected:           2019 0825            
Ordering Location:     [address]     Received:            2019 1435              Service                                                 
                   Pathologist:           Radha Mejias MD           
                                      Specimen:    Pancreas, Anne Pancreatic 
Lymphnode                                                  As reported by 
[facility], cytogenetic FISH results are POSITIVE for a variant t(14;18)/IGH-
BCL2 gene rearrangement and a 3'MYC gene deletion with gains of MYC, however a 
variant MYC rearrangement with an unknown partner cannot be excluded. Therefore
, a "double hit" lymphoma cannot be excluded without ruling out a variant MYC 
rearrangement (see attached cytogenetics report).Addendum electronically signed 
by Radha Mejias MD on 3/11/2019 at  2:16 PMLYMPH NODE, PERIPANCREATIC
, CORE NEEDLE BIOPSY:-HIGH GRADE B-CELL LYMPHOMA; PENDING CYTOGENETIC FISH 
STUDIES-SEE COMMENT      Signing Pathologist Direct Phone Line: [phone]
[phone] signed by Radha Mejias MD on 3/1/2019 at  4:49 
PMMorphologic review, together with the flow cytometric studies ([id number]) and 
immunohistochemical stains (see microscopic), are diagnostic of a B cell 
lymphoid neoplasm with a germinal center phenotype and high grade features, 
including overexpression of the MYC and BCL2 proteins, and an increased Ki-67 
proliferative index of 95%.  Cytogenetic FISH studies to evaluate for MYC, BCL2
, and/or BCL6 gene rearrangements have been requested for further 
characterization.  These results will follow in an addendum.  Dr. Radha Mejias discussed the case with Dr. Lashay Wallace on 3/1/2019.  [id number] ,20860
, 02411 e1Ggfepnqiqrd jaundice, lymphadenopathyPeripancreatic lymph node The 
specimen is received in a formalin-filled container labeled with the patient's 
information and labeled "peripancreatic lymph node" and consists of multiple 
small fragments of tan-red soft tissue all measuring less than 0.1 to 0.1 cm, 
submitted entirely in A1. CG/ew Sections show predominantly crushed lymphoid 
cells. In areas with better cellular preservation, the neoplastic cells have 
fine chromatin, occasional small nucleoli, and are associated with numerous 
mitoses and apoptotic debris. Immunohistochemical stains performed show the 
neoplastic cells are CD20 positive, MYC positive (95% of neoplastic population 
positive), BCL2 positive,  BCL6 positive, cyclin D1 negative, MUM1 negative, 
TdT negative, with a Ki67 proliferation index of 95%.  CD3 highlights scattered 
background T cells.  No follicular dendritic cell meshworks are identified with 
CD21. The interpretation of this case included the use of immunohistochemistry 
or special stains. Block A1: CD20, CD3, MYC, MUM1, BCL2, BCL6, Ki67, Cyclin D1, 
TdT, YR33Fmuweaaeuhyedwoomesz technical testing was performed at [facility], Pathology Laboratory where itwas developed and its 
performance characteristics were determined. It has not been cleared or 
approved by the U.S. Food and Drug Administration. The FDA has determined that 
such clearance or approval is not necessary. The test is used for clinical 
purposes. It should not be regarded as investigationalor for research. This 
laboratory is certified under the Clinical Laboratory Improvement Amendments of 
1988 (CLIA-88) as qualified to perform high complexity clinical laboratory 
testing.FLOW CYTOMETRY [id number] 14:12:00





 Test Item  Value  Reference Range  Comments

 

 FLOW CYTOMETRY RESULT POINTER (ALEYDA)  See Separate Report    



 (test code=2758)      

 

 FLOW CYTOMETRY AP CASE # (ALEYDA) (test  [id number]    



 code=2759)      



FLOW [id number] 12:56:00Flow Cytometry Report                         
    Case: [id number]                                 Authorizing Provider:  
Anand Morton MD Collected:           2019 1401            
Ordering Location:     [address]     Received:            2019 1536              SERVICE                                                 
                   Pathologist:           Franken, Alicia Anne, MD             
                                      Specimen:    Other                       
                                                         CEREBROSPINAL FLUID, 
FLOW CYTOMETRY: -NO MONOTYPIC B CELL POPULATION-NO ABERRANT T CELL POPULATION-
EVALUATION LIMITED BY PAUCICELLULARITY-SEE COMMENTElectronically signed by 
Franken, Alicia Anne, MD on 3/11/2019 at 12:56 PMThe patient's history of high-
grade B-cell lymphoma is noted. There is no definitive immunophenotypic 
evidence of involvement by non-Hodgkin lymphoma including the patient's 
previously diagnosed high-grade B-cell lymphoma; however, the evaluation is 
limited by paucicellularity and decreased viability (84.3%). Correlation with 
clinical, radiologic, and other laboratory findings is recommended for further 
evaluation. 88188High-grade B-cell lymphomaCerebrospinal fluidCD8, surface-kappa
, CD56, surface-lambda, CD5, CD19, CD10, CD3, CD20, CD4, WR24Nrhrckzd Viability
: 84.3%      Number of Events Acquired: 79690 The following populations are 
identified: Lymphocytes: Bright CD45+ lymphocytes comprise 19.5% of total 
cells. T cells show a CD4:CD8 ratio of 0.9 and normal expression of the pan T 
cell antigens CD3 and CD5. B cells are polytypic with a kappa:lambda ratio of 
2.2. Myeloid/monocytic populations: As identified by CD45 and light scatter 
characteristics, granulocytes comprise 28.7% of cells analyzed, and monocytes 
comprise 1.4% of total cells.  The remaining events analyzed represent 
nonviable cells, non-hematolymphoid cells, and debris.These tests were 
developed and their performance characteristics determined by [facility]. They have not been cleared or approved by theU.S. Food and Drug 
Administration. The FDA has determined that such clearance or approval is not 
necessary. It should not be regarded as investigational or for research. This 
laboratory is certified under the Clinical Laboratory Improvement Amendments of 
1988 ("CLIA") as qualified to perform high-complexity clinical testing.[facility], Department of Pathology, [address] [place] [address], Tel [phone] Angeles General Medical Center, Department of 
Pathology, [address] [place] [address], Tel [phone]
-03-11 06:16:00





 Test Item  Value  Reference Range  Comments

 

 PHOSPHORUS (BEAKER) (test code=604)  3.9 mg/dL  2.3-4.7  



URIC [id number] 06:16:00





 Test Item  Value  Reference Range  Comments

 

 URIC ACID (BEAKER) (test code=773)  2.0 mg/dL  2.6-7.2  



Specimen slightly ictericCOMPREHENSIVE METABOLIC [id number] 06:16:00





 Test Item  Value  Reference Range  Comments

 

 TOTAL PROTEIN (BEAKER)  6.0 gm/dL  6.0-8.3  



 (test code=770)      

 

 ALBUMIN (BEAKER) (test  3.0 g/dL  3.5-5.0  



 code=1145)      

 

 ALKALINE PHOSPHATASE  199 U/L    



 (BEAKER) (test code=346)      

 

 BILIRUBIN TOTAL (BEAKER)  2.6 mg/dL  0.2-1.2  



 (test code=377)      

 

 SODIUM (BEAKER) (test  139 meq/L  136-145  



 umsz=329)      

 

 POTASSIUM (BEAKER) (test  3.5 meq/L  3.5-5.1  



 code=379)      

 

 CHLORIDE (BEAKER) (test  104 meq/L    



 code=382)      

 

 CO2 (BEAKER) (test  25 meq/L  22-29  



 code=355)      

 

 BLOOD UREA NITROGEN  17 mg/dL  7-21  



 (BEAKER) (test code=354)      

 

 CREATININE (BEAKER) (test  0.70 mg/dL  0.57-1.25  



 code=358)      

 

 GLUCOSE RANDOM (BEAKER)  132 mg/dL    



 (test code=652)      

 

 CALCIUM (BEAKER) (test  8.7 mg/dL  8.4-10.2  



 code=697)      

 

 AST (SGOT) (BEAKER) (test  53 U/L  5-34  



 code=353)      

 

 ALT (SGPT) (BEAKER) (test  78 U/L  6-55  



 code=347)      

 

 EGFR (BEAKER) (test  115 mL/min/1.73 sq    ESTIMATED GFR IS NOT AS



 code=1092)  m    ACCURATE AS CREATININE



       CLEARANCE IN PREDICTING



       GLOMERULAR FILTRATION



       RATE. ESTIMATED GFR IS



       NOT APPLICABLE FOR



       DIALYSIS PATIENTS.



Specimen slightly ictericLACTATE DEHYDROGENASE (LDH)2019 06:16:00





 Test Item  Value  Reference Range  Comments

 

 LACTATE DEHYDROGENASE (BEAKER) (test code=635)  272 U/L  125-220  



CBC W/PLT COUNT &amp; AUTO [id number] 05:44:00





 Test Item  Value  Reference Range  Comments

 

 WHITE BLOOD CELL COUNT (BEAKER) (test code=775)  9.0 K/ L  3.5-10.5  

 

 RED BLOOD CELL COUNT (BEAKER) (test code=761)  4.25 M/ L  4.63-6.08  

 

 HEMOGLOBIN (BEAKER) (test code=410)  12.5 GM/DL  13.7-17.5  

 

 HEMATOCRIT (BEAKER) (test code=411)  37.9 %  40.1-51.0  

 

 MEAN CORPUSCULAR VOLUME (BEAKER) (test code=753)  89.2 fL  79.0-92.2  

 

 MEAN CORPUSCULAR HEMOGLOBIN (BEAKER) (test  29.4 pg  25.7-32.2  



 fgnu=290)      

 

 MEAN CORPUSCULAR HEMOGLOBIN CONC (BEAKER) (test  33.0 GM/DL  32.3-36.5  



 code=752)      

 

 RED CELL DISTRIBUTION WIDTH (BEAKER) (test  14.3 %  11.6-14.4  



 code=412)      

 

 PLATELET COUNT (BEAKER) (test code=756)  567 K/CU MM  150-450  

 

 MEAN PLATELET VOLUME (BEAKER) (test code=754)  9.8 fL  9.4-12.4  

 

 NUCLEATED RED BLOOD CELLS (BEAKER) (test  0 /100 WBC  0-0  



 code=413)      

 

 NEUTROPHILS RELATIVE PERCENT (BEAKER) (test  90 %    



 code=429)      

 

 LYMPHOCYTES RELATIVE PERCENT (BEAKER) (test  6 %    



 code=430)      

 

 MONOCYTES RELATIVE PERCENT (BEAKER) (test  2 %    



 code=431)      

 

 EOSINOPHILS RELATIVE PERCENT (BEAKER) (test  0 %    



 code=432)      

 

 BASOPHILS RELATIVE PERCENT (BEAKER) (test  0 %    



 code=437)      

 

 NEUTROPHILS ABSOLUTE COUNT (BEAKER) (test  8.11 K/ L  1.78-5.38  



 code=670)      

 

 LYMPHOCYTES ABSOLUTE COUNT (BEAKER) (test  0.56 K/ L  1.32-3.57  



 code=414)      

 

 MONOCYTES ABSOLUTE COUNT (BEAKER) (test  0.14 K/ L  0.30-0.82  



 code=415)      

 

 EOSINOPHILS ABSOLUTE COUNT (BEAKER) (test  0.00 K/ L  0.04-0.54  



 code=416)      

 

 BASOPHILS ABSOLUTE COUNT (BEAKER) (test  0.02 K/ L  0.01-0.08  



 code=417)      

 

 IMMATURE GRANULOCYTES-RELATIVE PERCENT (BEAKER)  2 %  0-1  



 (test code=2801)      



URIC ACID2019-03-10 06:46:00





 Test Item  Value  Reference Range  Comments

 

 URIC ACID (BEAKER) (test code=773)  1.7 mg/dL  2.6-7.2  



CBC W/PLT COUNT &amp; AUTO DIFFERENTIAL2019-03-10 05:49:00





 Test Item  Value  Reference Range  Comments

 

 WHITE BLOOD CELL COUNT (BEAKER) (test code=775)  9.7 K/ L  3.5-10.5  

 

 RED BLOOD CELL COUNT (BEAKER) (test code=761)  3.86 M/ L  4.63-6.08  

 

 HEMOGLOBIN (BEAKER) (test code=410)  11.3 GM/DL  13.7-17.5  

 

 HEMATOCRIT (BEAKER) (test code=411)  35.2 %  40.1-51.0  

 

 MEAN CORPUSCULAR VOLUME (BEAKER) (test code=753)  91.2 fL  79.0-92.2  

 

 MEAN CORPUSCULAR HEMOGLOBIN (BEAKER) (test  29.3 pg  25.7-32.2  



 axli=386)      

 

 MEAN CORPUSCULAR HEMOGLOBIN CONC (BEAKER) (test  32.1 GM/DL  32.3-36.5  



 code=752)      

 

 RED CELL DISTRIBUTION WIDTH (BEAKER) (test  15.3 %  11.6-14.4  



 code=412)      

 

 PLATELET COUNT (BEAKER) (test code=756)  503 K/CU MM  150-450  

 

 MEAN PLATELET VOLUME (BEAKER) (test code=754)  9.9 fL  9.4-12.4  

 

 NUCLEATED RED BLOOD CELLS (BEAKER) (test  0 /100 WBC  0-0  



 code=413)      

 

 NEUTROPHILS RELATIVE PERCENT (BEAKER) (test  89 %    



 code=429)      

 

 LYMPHOCYTES RELATIVE PERCENT (BEAKER) (test  6 %    



 code=430)      

 

 MONOCYTES RELATIVE PERCENT (BEAKER) (test  4 %    



 code=431)      

 

 EOSINOPHILS RELATIVE PERCENT (BEAKER) (test  0 %    



 code=432)      

 

 BASOPHILS RELATIVE PERCENT (BEAKER) (test  0 %    



 code=437)      

 

 NEUTROPHILS ABSOLUTE COUNT (BEAKER) (test  8.54 K/ L  1.78-5.38  



 code=670)      

 

 LYMPHOCYTES ABSOLUTE COUNT (BEAKER) (test  0.60 K/ L  1.32-3.57  



 code=414)      

 

 MONOCYTES ABSOLUTE COUNT (BEAKER) (test  0.38 K/ L  0.30-0.82  



 code=415)      

 

 EOSINOPHILS ABSOLUTE COUNT (BEAKER) (test  0.00 K/ L  0.04-0.54  



 code=416)      

 

 BASOPHILS ABSOLUTE COUNT (BEAKER) (test  0.01 K/ L  0.01-0.08  



 code=417)      

 

 IMMATURE GRANULOCYTES-RELATIVE PERCENT (BEAKER)  1 %  0-1  



 (test code=2801)      



PHOSPHORUS2019-03-10 05:35:00





 Test Item  Value  Reference Range  Comments

 

 PHOSPHORUS (BEAKER) (test code=604)  3.4 mg/dL  2.3-4.7  



COMPREHENSIVE METABOLIC PANEL2019-03-10 05:35:00





 Test Item  Value  Reference Range  Comments

 

 TOTAL PROTEIN (BEAKER)  5.8 gm/dL  6.0-8.3  



 (test code=770)      

 

 ALBUMIN (BEAKER) (test  2.8 g/dL  3.5-5.0  



 code=1145)      

 

 ALKALINE PHOSPHATASE  196 U/L    



 (BEAKER) (test code=346)      

 

 BILIRUBIN TOTAL (BEAKER)  2.3 mg/dL  0.2-1.2  



 (test code=377)      

 

 SODIUM (BEAKER) (test  138 meq/L  136-145  



 dpen=324)      

 

 POTASSIUM (BEAKER) (test  3.8 meq/L  3.5-5.1  



 code=379)      

 

 CHLORIDE (BEAKER) (test  106 meq/L    



 code=382)      

 

 CO2 (BEAKER) (test  23 meq/L  22-29  



 code=355)      

 

 BLOOD UREA NITROGEN  19 mg/dL  7-21  



 (BEAKER) (test code=354)      

 

 CREATININE (BEAKER) (test  0.68 mg/dL  0.57-1.25  



 code=358)      

 

 GLUCOSE RANDOM (BEAKER)  159 mg/dL    



 (test code=652)      

 

 CALCIUM (BEAKER) (test  8.3 mg/dL  8.4-10.2  



 code=697)      

 

 AST (SGOT) (BEAKER) (test  46 U/L  5-34  



 code=353)      

 

 ALT (SGPT) (BEAKER) (test  64 U/L  6-55  



 code=347)      

 

 EGFR (BEAKER) (test  119 mL/min/1.73 sq    ESTIMATED GFR IS NOT AS



 code=1092)  m    ACCURATE AS CREATININE



       CLEARANCE IN PREDICTING



       GLOMERULAR FILTRATION



       RATE. ESTIMATED GFR IS



       NOT APPLICABLE FOR



       DIALYSIS PATIENTS.



LACTATE DEHYDROGENASE (LDH)2019-03-10 05:35:00





 Test Item  Value  Reference Range  Comments

 

 LACTATE DEHYDROGENASE (BEAKER) (test code=635)  249 U/L  125-220  



BLOOD [id number] 11:01:00





 Test Item  Value  Reference Range  Comments

 

 CULTURE (BEAKER) (test code=1095)  No growth in 5 days    



BLOOD [id number] 11:01:00





 Test Item  Value  Reference Range  Comments

 

 CULTURE (BEAKER) (test code=1095)  No growth in 5 days    



HEPATITIS B PCR, [id number] 07:08:00





 Test Item  Value  Reference Range  Comments

 

 HBV RESULT COMPONENT (BEAKER)  HBV DNA not detected  HBV DNA not detected  



 (test code=2701)      



This test uses a Real-Time Polymerase Chain Reaction (RT-PCR) methodology and 
was performed using STU  AmpliPrep/STU  TaqMan  HBV Test, v2.0 ([employer] 
[employer]Reportable range for this assay is 20 - 170,000,000 IU 
per mL (1.30 - 8.23 Log IU/mL).CBC W/PLT COUNT &amp; AUTO [id number]
[id number] 06:47:00





 Test Item  Value  Reference Range  Comments

 

 WHITE BLOOD CELL COUNT (BEAKER) (test code=775)  10.4 K/ L  3.5-10.5  

 

 RED BLOOD CELL COUNT (BEAKER) (test code=761)  3.64 M/ L  4.63-6.08  

 

 HEMOGLOBIN (BEAKER) (test code=410)  10.5 GM/DL  13.7-17.5  

 

 HEMATOCRIT (BEAKER) (test code=411)  33.8 %  40.1-51.0  

 

 MEAN CORPUSCULAR VOLUME (BEAKER) (test code=753)  92.9 fL  79.0-92.2  

 

 MEAN CORPUSCULAR HEMOGLOBIN (BEAKER) (test  28.8 pg  25.7-32.2  



 gujd=753)      

 

 MEAN CORPUSCULAR HEMOGLOBIN CONC (BEAKER) (test  31.1 GM/DL  32.3-36.5  



 code=752)      

 

 RED CELL DISTRIBUTION WIDTH (BEAKER) (test  15.6 %  11.6-14.4  



 code=412)      

 

 PLATELET COUNT (BEAKER) (test code=756)  482 K/CU MM  150-450  

 

 MEAN PLATELET VOLUME (BEAKER) (test code=754)  9.7 fL  9.4-12.4  

 

 NUCLEATED RED BLOOD CELLS (BEAKER) (test  0 /100 WBC  0-0  



 code=413)      

 

 NEUTROPHILS RELATIVE PERCENT (BEAKER) (test  88 %    



 code=429)      

 

 LYMPHOCYTES RELATIVE PERCENT (BEAKER) (test  6 %    



 code=430)      

 

 MONOCYTES RELATIVE PERCENT (BEAKER) (test  4 %    



 code=431)      

 

 EOSINOPHILS RELATIVE PERCENT (BEAKER) (test  0 %    



 code=432)      

 

 BASOPHILS RELATIVE PERCENT (BEAKER) (test  0 %    



 code=437)      

 

 NEUTROPHILS ABSOLUTE COUNT (BEAKER) (test  9.19 K/ L  1.78-5.38  



 code=670)      

 

 LYMPHOCYTES ABSOLUTE COUNT (BEAKER) (test  0.63 K/ L  1.32-3.57  



 code=414)      

 

 MONOCYTES ABSOLUTE COUNT (BEAKER) (test  0.41 K/ L  0.30-0.82  



 code=415)      

 

 EOSINOPHILS ABSOLUTE COUNT (BEAKER) (test  0.00 K/ L  0.04-0.54  



 code=416)      

 

 BASOPHILS ABSOLUTE COUNT (BEAKER) (test  0.03 K/ L  0.01-0.08  



 code=417)      

 

 IMMATURE GRANULOCYTES-RELATIVE PERCENT (BEAKER)  2 %  0-1  



 (test code=2801)      



[id number] 06:45:00





 Test Item  Value  Reference Range  Comments

 

 FERRITIN (BEAKER) (test code=361)  712 ng/mL  5-275  



TSH/FREE T4 IF [id number] 06:45:00





 Test Item  Value  Reference Range  Comments

 

 THYROID STIMULATING HORMONE (BEAKER) (test  0.79 uIU/mL  0.35-4.94  



 code=772)      



VITAMIN B12 AND [id number] 06:45:00





 Test Item  Value  Reference Range  Comments

 

 VITAMIN B12 (BEAKER) (test code=774)  559 pg/mL  213-816  

 

 FOLATE (BEAKER) (test code=362)  6.6 ng/mL  >=7.0  



URIC [id number] 06:29:00





 Test Item  Value  Reference Range  Comments

 

 URIC ACID (BEAKER) (test code=773)  1.3 mg/dL  2.6-7.2  



COMPREHENSIVE METABOLIC [id number] 06:29:00





 Test Item  Value  Reference Range  Comments

 

 TOTAL PROTEIN (BEAKER)  5.3 gm/dL  6.0-8.3  



 (test code=770)      

 

 ALBUMIN (BEAKER) (test  2.5 g/dL  3.5-5.0  



 code=1145)      

 

 ALKALINE PHOSPHATASE  185 U/L    



 (BEAKER) (test code=346)      

 

 BILIRUBIN TOTAL (BEAKER)  2.1 mg/dL  0.2-1.2  



 (test code=377)      

 

 SODIUM (BEAKER) (test  141 meq/L  136-145  



 ptbc=079)      

 

 POTASSIUM (BEAKER) (test  3.5 meq/L  3.5-5.1  



 code=379)      

 

 CHLORIDE (BEAKER) (test  113 meq/L    



 code=382)      

 

 CO2 (BEAKER) (test  20 meq/L  22-29  



 code=355)      

 

 BLOOD UREA NITROGEN  18 mg/dL  7-21  



 (BEAKER) (test code=354)      

 

 CREATININE (BEAKER) (test  0.65 mg/dL  0.57-1.25  



 code=358)      

 

 GLUCOSE RANDOM (BEAKER)  156 mg/dL    



 (test code=652)      

 

 CALCIUM (BEAKER) (test  7.6 mg/dL  8.4-10.2  



 code=697)      

 

 AST (SGOT) (BEAKER) (test  38 U/L  5-34  



 code=353)      

 

 ALT (SGPT) (BEAKER) (test  50 U/L  6-55  



 code=347)      

 

 EGFR (BEAKER) (test  126 mL/min/1.73 sq    ESTIMATED GFR IS NOT AS



 code=1092)  m    ACCURATE AS CREATININE



       CLEARANCE IN PREDICTING



       GLOMERULAR FILTRATION



       RATE. ESTIMATED GFR IS



       NOT APPLICABLE FOR



       DIALYSIS PATIENTS.



[id number] 06:27:00





 Test Item  Value  Reference Range  Comments

 

 PHOSPHORUS (BEAKER) (test code=604)  2.3 mg/dL  2.3-4.7  



LACTATE DEHYDROGENASE (LDH)2019 06:27:00





 Test Item  Value  Reference Range  Comments

 

 LACTATE DEHYDROGENASE (BEAKER) (test code=635)  218 U/L  125-220  



IRON, TIBC, % SAT. (WITHOUT FERRITIN)2019 06:09:00





 Test Item  Value  Reference Range  Comments

 

 IRON (BEAKER) (test code=547)  69.0 ug/dL  40.0-160.0  

 

 TOTAL IRON BINDING CAPACITY (BEAKER) (test  178 ug/dL  250-450  



 code=769)      

 

 IRON % SATURATION (2) (BEAKER) (test code=2590)  39 %  20-55  



PROTEIN, [id number] 15:15:00





 Test Item  Value  Reference Range  Comments

 

 PROTEIN CSF (BEAKER) (test code=378)  27 mg/dL  15-45  



FL, LUMBAR PUNCTURE, [id number] 15:09:00CSF needs to be sent for cytology 
and FLOW (orders are in)Reason for exam:-&gt;Intrathecal methotrexate for high 
grade lymphoma; please obtain sample for flow cytometry, protein, and cytology 
studies with LPReason for exam:-&gt;hydrocortisone 50 mg, methotrexate (PF) 12 
mg in sodium chloride 0.9% (NS)PF 3.52 mL intra-csf chemo injectionFINAL REPORT 
PATIENT ID:   [id number] Procedure: Fluoroscopy guided lumbar puncture for 
intrathecal chemotherapy Radiologist: Shad Medrano M.D. CLINICAL HISTORY: 
Intrathecal methotrexate for high grade lymphoma; please obtain sample for flow 
cytometry, protein, and cytology studies with LP DESCRIPTION OF PROCEDURE: 
After obtaining informed consent, the patient was prepped and draped on the 
fluoroscopytable following the usual sterile fashion for lumbar puncture. 1% 
lidocaine was administered for local anesthesia. Using fluoroscopic guidance, a 
22-gauge spinal needle advanced into the thecal sac at the L3-L4 level. 
Approximately 14 cc of clear CSF was removed and sent to the laboratory for the 
requested studies. Next, the labeled syringe of  methotrexate 12 mg and 
hydrocortisone 50 mg was hand injected into the thecal sac without difficulty. 
There were no periprocedural complications. Fluoroscopytime: 0.1 minutes. Images
: 1. Impression:  Successful fluoroscopy guided lumbar puncture for 
administration of intrathecal methotrexate 12 mg and hydrocortisone 50 mg. 
Signed: Shad Medrano MDReport Verified Date/Time:  2019 15:09:17 Reading 
Location: [address] Neuro Reading Room      Electronically signed by: SHAD MEDRANO M.D. on 2019 03:09 PMPROTHROMBIN TIME/[id number] 11:02:00





 Test Item  Value  Reference Range  Comments

 

 PROTIME (BEAKER) (test code=759)  14.5 seconds  11.7-14.7  

 

 INR (BEAKER) (test code=370)  1.1  <=5.9  



RECOMMENDED COUMADIN/WARFARIN INR THERAPY RANGESSTANDARD DOSE: 2.0 - 3.0   
Includes: PROPHYLAXIS forvenous thrombosis, systemic embolization; TREATMENT 
for venous thrombosis and/or pulmonary embolus.HIGH RISK: Target INR is 2.5-3.5 
for patients with mechanical heart valves.CBC W/PLT COUNT &amp; AUTO 
[id number] 08:50:00





 Test Item  Value  Reference Range  Comments

 

 WHITE BLOOD CELL COUNT (BEAKER) (test code=775)  9.0 K/ L  3.5-10.5  

 

 RED BLOOD CELL COUNT (BEAKER) (test code=761)  3.74 M/ L  4.63-6.08  

 

 HEMOGLOBIN (BEAKER) (test code=410)  10.9 GM/DL  13.7-17.5  

 

 HEMATOCRIT (BEAKER) (test code=411)  34.1 %  40.1-51.0  

 

 MEAN CORPUSCULAR VOLUME (BEAKER) (test code=753)  91.2 fL  79.0-92.2  

 

 MEAN CORPUSCULAR HEMOGLOBIN (BEAKER) (test  29.1 pg  25.7-32.2  



 ysnw=567)      

 

 MEAN CORPUSCULAR HEMOGLOBIN CONC (BEAKER) (test  32.0 GM/DL  32.3-36.5  



 code=752)      

 

 RED CELL DISTRIBUTION WIDTH (BEAKER) (test  15.5 %  11.6-14.4  



 code=412)      

 

 PLATELET COUNT (BEAKER) (test code=756)  422 K/CU MM  150-450  

 

 MEAN PLATELET VOLUME (BEAKER) (test code=754)  9.6 fL  9.4-12.4  

 

 NUCLEATED RED BLOOD CELLS (BEAKER) (test  0 /100 WBC  0-0  



 code=413)      



(CELLAVISION MANUAL DIFF)2019 08:50:00





 Test Item  Value  Reference Range  Comments

 

 NEUTROPHILS - REL (CELLAVISION)(BEAKER) (test  90 %    



 code=2816)      

 

 LYMPHOCYTES - REL (CELLAVISION)(BEAKER) (test  5 %    



 code=2817)      

 

 MONOCYTES - REL (CELLAVISION)(BEAKER) (test  5 %    



 code=2818)      

 

 NEUTROPHILS - ABS (CELLAVISION)(BEAKER) (test  8.10 K/ul  1.78-5.38  



 code=2830)      

 

 LYMPHOCYTES - ABS (CELLAVISION)(BEAKER) (test  0.45 K/ul  1.32-3.57  



 code=2831)      

 

 MONOCYTES - ABS (CELLAVISION)(BEAKER) (test  0.45 K/uL  0.30-0.82  



 code=2832)      

 

 TOTAL COUNTED (BEAKER) (test code=1351)  100    

 

 RBC MORPHOLOGY (BEAKER) (test code=762)  Normal    

 

 WBC MORPHOLOGY (BEAKER) (test code=487)  Normal    

 

 GIANT PLATELETS (BEAKER) (test code=313)  Present    

 

 ARTIFACT (CELLAVISION)(BEAKER) (test code=3432)  Present    

 

 PLATELET CONCENTRATION (CELLAVISION)(BEAKER) (test  Adequate    



 cuiz=9550)      



Received comment: User comments: Slide comments:URIC [id number] 05:45:00





 Test Item  Value  Reference Range  Comments

 

 URIC ACID (BEAKER) (test code=773)  1.5 mg/dL  2.6-7.2  



[id number] 05:38:00





 Test Item  Value  Reference Range  Comments

 

 PHOSPHORUS (BEAKER) (test code=604)  2.4 mg/dL  2.3-4.7  



COMPREHENSIVE METABOLIC [id number] 05:38:00





 Test Item  Value  Reference Range  Comments

 

 TOTAL PROTEIN (BEAKER)  5.8 gm/dL  6.0-8.3  



 (test code=770)      

 

 ALBUMIN (BEAKER) (test  2.6 g/dL  3.5-5.0  



 code=1145)      

 

 ALKALINE PHOSPHATASE  206 U/L    



 (BEAKER) (test code=346)      

 

 BILIRUBIN TOTAL (BEAKER)  2.4 mg/dL  0.2-1.2  



 (test code=377)      

 

 SODIUM (BEAKER) (test  141 meq/L  136-145  



 wcif=534)      

 

 POTASSIUM (BEAKER) (test  3.9 meq/L  3.5-5.1  



 code=379)      

 

 CHLORIDE (BEAKER) (test  109 meq/L    



 code=382)      

 

 CO2 (BEAKER) (test  23 meq/L  22-29  



 code=355)      

 

 BLOOD UREA NITROGEN  16 mg/dL  7-21  



 (BEAKER) (test code=354)      

 

 CREATININE (BEAKER) (test  0.69 mg/dL  0.57-1.25  



 code=358)      

 

 GLUCOSE RANDOM (BEAKER)  186 mg/dL    



 (test code=652)      

 

 CALCIUM (BEAKER) (test  8.4 mg/dL  8.4-10.2  



 code=697)      

 

 AST (SGOT) (BEAKER) (test  29 U/L  5-34  



 code=353)      

 

 ALT (SGPT) (BEAKER) (test  50 U/L  6-55  



 code=347)      

 

 EGFR (BEAKER) (test  117 mL/min/1.73 sq    ESTIMATED GFR IS NOT AS



 code=1092)  m    ACCURATE AS CREATININE



       CLEARANCE IN PREDICTING



       GLOMERULAR FILTRATION



       RATE. ESTIMATED GFR IS



       NOT APPLICABLE FOR



       DIALYSIS PATIENTS.



LACTATE DEHYDROGENASE (LDH)2019 05:38:00





 Test Item  Value  Reference Range  Comments

 

 LACTATE DEHYDROGENASE (BEAKER) (test code=635)  251 U/L  125-220  



[id number] 07:21:00





 Test Item  Value  Reference Range  Comments

 

 PHOSPHORUS (BEAKER) (test code=604)  2.5 mg/dL  2.3-4.7  



URIC [id number] 07:21:00





 Test Item  Value  Reference Range  Comments

 

 URIC ACID (BEAKER) (test code=773)  3.6 mg/dL  2.6-7.2  



Specimen slightly ictericCOMPREHENSIVE METABOLIC [id number] 07:21:00





 Test Item  Value  Reference Range  Comments

 

 TOTAL PROTEIN (BEAKER)  5.7 gm/dL  6.0-8.3  



 (test code=770)      

 

 ALBUMIN (BEAKER) (test  2.6 g/dL  3.5-5.0  



 code=1145)      

 

 ALKALINE PHOSPHATASE  230 U/L    



 (BEAKER) (test code=346)      

 

 BILIRUBIN TOTAL (BEAKER)  3.1 mg/dL  0.2-1.2  



 (test code=377)      

 

 SODIUM (BEAKER) (test  140 meq/L  136-145  



 jkwe=748)      

 

 POTASSIUM (BEAKER) (test  3.5 meq/L  3.5-5.1  



 code=379)      

 

 CHLORIDE (BEAKER) (test  107 meq/L    



 code=382)      

 

 CO2 (BEAKER) (test  24 meq/L  22-29  



 code=355)      

 

 BLOOD UREA NITROGEN  16 mg/dL  7-21  



 (BEAKER) (test code=354)      

 

 CREATININE (BEAKER) (test  0.76 mg/dL  0.57-1.25  



 code=358)      

 

 GLUCOSE RANDOM (BEAKER)  86 mg/dL    



 (test code=652)      

 

 CALCIUM (BEAKER) (test  8.0 mg/dL  8.4-10.2  



 code=697)      

 

 AST (SGOT) (BEAKER) (test  33 U/L  5-34  



 code=353)      

 

 ALT (SGPT) (BEAKER) (test  57 U/L  6-55  



 code=347)      

 

 EGFR (BEAKER) (test  105 mL/min/1.73 sq    ESTIMATED GFR IS NOT AS



 code=1092)  m    ACCURATE AS CREATININE



       CLEARANCE IN PREDICTING



       GLOMERULAR FILTRATION



       RATE. ESTIMATED GFR IS



       NOT APPLICABLE FOR



       DIALYSIS PATIENTS.



Specimen slightly ictericLACTATE DEHYDROGENASE (LDH)2019 07:21:00





 Test Item  Value  Reference Range  Comments

 

 LACTATE DEHYDROGENASE (BEAKER) (test code=635)  250 U/L  125-220  



CBC W/PLT COUNT &amp; AUTO [id number] 06:43:00





 Test Item  Value  Reference Range  Comments

 

 WHITE BLOOD CELL COUNT (BEAKER) (test code=775)  9.7 K/ L  3.5-10.5  

 

 RED BLOOD CELL COUNT (BEAKER) (test code=761)  3.60 M/ L  4.63-6.08  

 

 HEMOGLOBIN (BEAKER) (test code=410)  10.4 GM/DL  13.7-17.5  

 

 HEMATOCRIT (BEAKER) (test code=411)  32.9 %  40.1-51.0  

 

 MEAN CORPUSCULAR VOLUME (BEAKER) (test code=753)  91.4 fL  79.0-92.2  

 

 MEAN CORPUSCULAR HEMOGLOBIN (BEAKER) (test  28.9 pg  25.7-32.2  



 cufz=544)      

 

 MEAN CORPUSCULAR HEMOGLOBIN CONC (BEAKER) (test  31.6 GM/DL  32.3-36.5  



 code=752)      

 

 RED CELL DISTRIBUTION WIDTH (BEAKER) (test  15.8 %  11.6-14.4  



 code=412)      

 

 PLATELET COUNT (BEAKER) (test code=756)  406 K/CU MM  150-450  

 

 MEAN PLATELET VOLUME (BEAKER) (test code=754)  9.3 fL  9.4-12.4  

 

 NUCLEATED RED BLOOD CELLS (BEAKER) (test  0 /100 WBC  0-0  



 code=413)      

 

 NEUTROPHILS RELATIVE PERCENT (BEAKER) (test  73 %    



 code=429)      

 

 LYMPHOCYTES RELATIVE PERCENT (BEAKER) (test  12 %    



 code=430)      

 

 MONOCYTES RELATIVE PERCENT (BEAKER) (test  11 %    



 code=431)      

 

 EOSINOPHILS RELATIVE PERCENT (BEAKER) (test  2 %    



 code=432)      

 

 BASOPHILS RELATIVE PERCENT (BEAKER) (test  0 %    



 code=437)      

 

 NEUTROPHILS ABSOLUTE COUNT (BEAKER) (test  7.10 K/ L  1.78-5.38  



 code=670)      

 

 LYMPHOCYTES ABSOLUTE COUNT (BEAKER) (test  1.20 K/ L  1.32-3.57  



 code=414)      

 

 MONOCYTES ABSOLUTE COUNT (BEAKER) (test  1.09 K/ L  0.30-0.82  



 code=415)      

 

 EOSINOPHILS ABSOLUTE COUNT (BEAKER) (test  0.16 K/ L  0.04-0.54  



 code=416)      

 

 BASOPHILS ABSOLUTE COUNT (BEAKER) (test  0.03 K/ L  0.01-0.08  



 code=417)      

 

 IMMATURE GRANULOCYTES-RELATIVE PERCENT (BEAKER)  2 %  0-1  



 (test code=4705)      



CBC W/PLT COUNT &amp; AUTO [id number] 06:42:00





 Test Item  Value  Reference Range  Comments

 

 WHITE BLOOD CELL COUNT (BEAKER) (test code=775)  9.7 K/ L  3.5-10.5  

 

 RED BLOOD CELL COUNT (BEAKER) (test code=761)  3.64 M/ L  4.63-6.08  

 

 HEMOGLOBIN (BEAKER) (test code=410)  10.8 GM/DL  13.7-17.5  

 

 HEMATOCRIT (BEAKER) (test code=411)  33.3 %  40.1-51.0  

 

 MEAN CORPUSCULAR VOLUME (BEAKER) (test code=753)  91.5 fL  79.0-92.2  

 

 MEAN CORPUSCULAR HEMOGLOBIN (BEAKER) (test  29.7 pg  25.7-32.2  



 mpwh=308)      

 

 MEAN CORPUSCULAR HEMOGLOBIN CONC (BEAKER) (test  32.4 GM/DL  32.3-36.5  



 code=752)      

 

 RED CELL DISTRIBUTION WIDTH (BEAKER) (test  15.8 %  11.6-14.4  



 code=412)      

 

 PLATELET COUNT (BEAKER) (test code=756)  422 K/CU MM  150-450  

 

 MEAN PLATELET VOLUME (BEAKER) (test code=754)  9.5 fL  9.4-12.4  

 

 NUCLEATED RED BLOOD CELLS (BEAKER) (test  0 /100 WBC  0-0  



 code=413)      

 

 NEUTROPHILS RELATIVE PERCENT (BEAKER) (test  73 %    



 code=429)      

 

 LYMPHOCYTES RELATIVE PERCENT (BEAKER) (test  13 %    



 code=430)      

 

 MONOCYTES RELATIVE PERCENT (BEAKER) (test  11 %    



 code=431)      

 

 EOSINOPHILS RELATIVE PERCENT (BEAKER) (test  2 %    



 code=432)      

 

 BASOPHILS RELATIVE PERCENT (BEAKER) (test  0 %    



 code=437)      

 

 NEUTROPHILS ABSOLUTE COUNT (BEAKER) (test  7.06 K/ L  1.78-5.38  



 code=670)      

 

 LYMPHOCYTES ABSOLUTE COUNT (BEAKER) (test  1.22 K/ L  1.32-3.57  



 code=414)      

 

 MONOCYTES ABSOLUTE COUNT (BEAKER) (test  1.07 K/ L  0.30-0.82  



 code=415)      

 

 EOSINOPHILS ABSOLUTE COUNT (BEAKER) (test  0.16 K/ L  0.04-0.54  



 code=416)      

 

 BASOPHILS ABSOLUTE COUNT (BEAKER) (test  0.03 K/ L  0.01-0.08  



 code=417)      

 

 IMMATURE GRANULOCYTES-RELATIVE PERCENT (BEAKER)  1 %  0-1  



 (test code=2801)      



[id number] 20:48:00





 Test Item  Value  Reference Range  Comments

 

 MAGNESIUM (BEAKER) (test code=627)  2.1 mg/dL  1.6-2.6  



[id number] 20:47:00





 Test Item  Value  Reference Range  Comments

 

 MAGNESIUM (BEAKER) (test code=627)  2.1 mg/dL  1.6-2.6  



RAD, ABDOMEN/KUB, 1 VIEW [id number] 17:31:00Reason for exam:-&gt;pls confirm 
corepak position, if not post pyloric pls advice on how far to advance 
tubeReason for exam:-&gt;call bedside nurse prior to comingShould this be 
performed at the bedside?-&gt;YesFINAL REPORT PATIENT ID:   [id number] ONE VIEW 
ABDOMEN HISTORY: Status post feeding tube placement COMPARISON: 1109 hours on 3/
2019 FINDINGS: Single supine AP image of the abdomen was obtained. The 
feeding tube has been advanced, with its tip now in the duodenal bulb. The 
biliary drainage catheter in the right abdomen. A stent is noted in the region 
of the pancreatic duct or common bile duct. No dilated bowel loops are 
identified. Signed: Jose Nunez MDReport Verified Date/Time:  2019 17:31:
04 Reading Location: [address] Consult Reading Room       Electronically 
signed by: JOSE NUNEZ MD on 2019 05:31 PMRAD, CHEST, 1 VIEW, NON 
[id number] 14:03:00Reason for exam:-&gt;post picc line insertionShould this 
be performed at the bedside?-&gt;YesFINAL REPORT PATIENT ID:   [id number] 
Portable chest. MEDICAL HISTORY: Post PICC line insertion. COMPARISON STUDY: 
None available. FINDINGS: The cardiac silhouette is enlarged. There is 
elevation of the right hemidiaphragm with bibasilar atelectasis or 
consolidation. A feeding tube is noted. A right PICC line is in place, the tip 
projecting over the SVC. Degenerative changes are seen. IMPRESSION: RightPICC 
line insertion, the tip projecting over the SVC. Signed: Andrés Lynn 
MDReport Verified Date/Time:  2019 14:03:51 Reading Location: [facility] 
C013W Consult Reading Room      Electronically signed by: ANDRÉS LYNN M.D. 
on 2019 02:03 PMRAD, ABDOMEN/KUB, 1 VIEW [id number] 11:59:00Reason for 
exam:-&gt;corepak placement, is it post pyloric?  please call nurse before 
coming to bedside to confirm tube has been advanced.  if not post pyloric, 
please advise on amount tube needs to beadvanced in your read.Should this be 
performed at the bedside?-&gt;YesFINAL REPORT PATIENT ID:   [id number] Abdomen. 
Clinical History: corepak placement, is it post pyloric?  please call nurse 
before coming to bedside to confirm tube has been advanced.  if not post pyloric
, please advise on amount tube needs to be advanced in your read. Comparison 
Study: 2019 Findings: A single supine view of the abdomen demonstrates 
a feeding tube in place, the tip at the gastroesophageal junction. It could be 
advanced several centimeters to obtain a postpyloric position. The stomach is 
gas-filled. No dilated bowel loops are seen. This film is insensitive for the 
detection offree air. Signed: Andrés Lynn MDReport Verified Date/Time:  2019 11:59:44 Reading Location: [facility] Radiology Reading Room      
Electronically signed by: ANDRÉS LYNN M.D. on 2019 11:59 AMLACTATE 
DEHYDROGENASE (LDH)2019 11:43:00





 Test Item  Value  Reference Range  Comments

 

 LACTATE DEHYDROGENASE (BEAKER) (test code=635)  379 U/L  125-220  



[id number] 11:43:00





 Test Item  Value  Reference Range  Comments

 

 PHOSPHORUS (BEAKER) (test code=604)  3.1 mg/dL  2.3-4.7  



URIC [id number] 11:43:00





 Test Item  Value  Reference Range  Comments

 

 URIC ACID (BEAKER) (test code=773)  7.6 mg/dL  2.6-7.2  



Specimen slightly ictericFINE NEEDLE ASPIRATION BY [id number] 11:20:
00Medical Cytology Report                           Case: [id number]            
                     Authorizing Provider:  Chuck Melgoza MD    Collected
:           2019 0819            Ordering Location:     [address]     Received:            2019 1057              Service          
                                                          Pathologist:         
  Saray Caruso MD                                          
                               Specimen:    Pancreatic, Anne pancreatic lymph 
node                                               This addendum is issued to 
update the diagnosis based on the flow report on [id number] and the surgical 
correlate [id number]:PERIPANCREATIC LYMPH NODE FNA BY CLINICIAN (CYTOSPINS AND 
CELL BLOCK OF ASPIRATE):- HIGH GRADE B-CELL LYMPHOMA; PENDING CYTOGENETIC 
STUDIES - NEGATIVE FOR EPITHELIAL MALIGNANCY Addendum electronicallysigned by 
Saray Caruso MD on 3/6/2019 at 11:20 AMPERIPANCREATIC LYMPH NODE 
FNA BY CLINICIAN (CYTOSPINS AND CELL BLOCK OF ASPIRATE):   - NEGATIVE FOR 
EPITHELIAL MALIGNANCY (SEE COMMENT)      Signing Pathologist Direct Phone Line: 
[phone] signed by Saray Caruso MD on 2019 
at  7:08 PMPlease refer to to cases [id number] and [id number] for additional 
information.[id number], [id number]EUVWU LYMPH NODE FNA15 mls in 
cytorich red; 4 cytospins, cell blockCollected: [id number]: [id number] 
[facility], Department of Pathology, [address] [place] [address], Tel [phone] Angeles General Medical Center, Department of 
Pathology, [address] [place] [address], Tel [phone] Angeles General Medical Center, Department of Pathology, [address] [place] [address], Tel [phone] B SURFACE [id number] 10:08:00





 Test Item  Value  Reference Range  Comments

 

 HEPATITIS B SURFACE ANTIGEN (2) (BEAKER) (test  Nonreactive  Nonreactive  



 code=2585)      



[id number] 05:22:00





 Test Item  Value  Reference Range  Comments

 

 MAGNESIUM (BEAKER) (test code=627)  2.3 mg/dL  1.6-2.6  



COMPREHENSIVE METABOLIC [id number] 05:22:00





 Test Item  Value  Reference Range  Comments

 

 TOTAL PROTEIN (BEAKER)  6.6 gm/dL  6.0-8.3  



 (test code=770)      

 

 ALBUMIN (BEAKER) (test  3.1 g/dL  3.5-5.0  



 code=1145)      

 

 ALKALINE PHOSPHATASE  321 U/L    



 (BEAKER) (test code=346)      

 

 BILIRUBIN TOTAL (BEAKER)  4.0 mg/dL  0.2-1.2  



 (test code=377)      

 

 SODIUM (BEAKER) (test  140 meq/L  136-145  



 coab=454)      

 

 POTASSIUM (BEAKER) (test  3.8 meq/L  3.5-5.1  



 code=379)      

 

 CHLORIDE (BEAKER) (test  103 meq/L    



 code=382)      

 

 CO2 (BEAKER) (test  24 meq/L  22-29  



 code=355)      

 

 BLOOD UREA NITROGEN  18 mg/dL  7-21  



 (BEAKER) (test code=354)      

 

 CREATININE (BEAKER) (test  0.95 mg/dL  0.57-1.25  



 code=358)      

 

 GLUCOSE RANDOM (BEAKER)  111 mg/dL    



 (test code=652)      

 

 CALCIUM (BEAKER) (test  9.1 mg/dL  8.4-10.2  



 code=697)      

 

 AST (SGOT) (BEAKER) (test  56 U/L  5-34  



 code=353)      

 

 ALT (SGPT) (BEAKER) (test  101 U/L  6-55  



 code=347)      

 

 EGFR (BEAKER) (test  81 mL/min/1.73 sq m    ESTIMATED GFR IS NOT AS



 code=1092)      ACCURATE AS CREATININE



       CLEARANCE IN PREDICTING



       GLOMERULAR FILTRATION



       RATE. ESTIMATED GFR IS



       NOT APPLICABLE FOR



       DIALYSIS PATIENTS.



Specimen slightly ictericCBC W/PLT COUNT &amp; AUTO [id number] 05:02
:00





 Test Item  Value  Reference Range  Comments

 

 WHITE BLOOD CELL COUNT (BEAKER) (test code=775)  8.4 K/ L  3.5-10.5  

 

 RED BLOOD CELL COUNT (BEAKER) (test code=761)  3.91 M/ L  4.63-6.08  

 

 HEMOGLOBIN (BEAKER) (test code=410)  11.5 GM/DL  13.7-17.5  

 

 HEMATOCRIT (BEAKER) (test code=411)  34.9 %  40.1-51.0  

 

 MEAN CORPUSCULAR VOLUME (BEAKER) (test code=753)  89.3 fL  79.0-92.2  

 

 MEAN CORPUSCULAR HEMOGLOBIN (BEAKER) (test  29.4 pg  25.7-32.2  



 uyls=491)      

 

 MEAN CORPUSCULAR HEMOGLOBIN CONC (BEAKER) (test  33.0 GM/DL  32.3-36.5  



 code=752)      

 

 RED CELL DISTRIBUTION WIDTH (BEAKER) (test  15.9 %  11.6-14.4  



 code=412)      

 

 PLATELET COUNT (BEAKER) (test code=756)  372 K/CU MM  150-450  

 

 MEAN PLATELET VOLUME (BEAKER) (test code=754)  9.5 fL  9.4-12.4  

 

 NUCLEATED RED BLOOD CELLS (BEAKER) (test  0 /100 WBC  0-0  



 code=413)      

 

 NEUTROPHILS RELATIVE PERCENT (BEAKER) (test  73 %    



 code=429)      

 

 LYMPHOCYTES RELATIVE PERCENT (BEAKER) (test  13 %    



 code=430)      

 

 MONOCYTES RELATIVE PERCENT (BEAKER) (test  12 %    



 code=431)      

 

 EOSINOPHILS RELATIVE PERCENT (BEAKER) (test  1 %    



 code=432)      

 

 BASOPHILS RELATIVE PERCENT (BEAKER) (test  0 %    



 code=437)      

 

 NEUTROPHILS ABSOLUTE COUNT (BEAKER) (test  6.16 K/ L  1.78-5.38  



 code=670)      

 

 LYMPHOCYTES ABSOLUTE COUNT (BEAKER) (test  1.09 K/ L  1.32-3.57  



 code=414)      

 

 MONOCYTES ABSOLUTE COUNT (BEAKER) (test  1.01 K/ L  0.30-0.82  



 code=415)      

 

 EOSINOPHILS ABSOLUTE COUNT (BEAKER) (test  0.04 K/ L  0.04-0.54  



 code=416)      

 

 BASOPHILS ABSOLUTE COUNT (BEAKER) (test  0.02 K/ L  0.01-0.08  



 code=417)      

 

 IMMATURE GRANULOCYTES-RELATIVE PERCENT (BEAKER)  1 %  0-1  



 (test code=2801)      



MR, ABDOMEN, [id number] 00:05:00FINAL REPORT PATIENT ID:   [id number] 
INDICATION:Mesenteric mass, lymphoma. Evaluate for biliary obstruction. 
COMPARISON: Abdomen radiograph 2019Abdomen pelvis CT 2019 
TECHNIQUE: MRof the Abdomen WITHOUT intravenous contrast.MRCP, including 3-D 
reconstructions. FINDINGS:As demonstrated on recent CT there is a bulky mass of 
the central mesentery contiguous with periaortic lymphadenopathy. Right 
transhepatic internal/external biliary drainage catheter and stent in the 
common bile duct better demonstrated on radiography. Gallbladder is partly 
collapsed. There is no biliary ductal dilatation. Tiny amount of perihepatic 
and perisplenic fluid is noted. Spleen itself is unremarkable.Adrenal glands 
and kidneys unremarkable. Visualized bowel loops unremarkable. IMPRESSION:
Central mesenteric mass, representing lymphoma. No biliary ductal dilatation. 
Signed: Isrrael Galiciaort Verified Date/Time:  2019 00:05:45 
Reading Location: [address] Consult Reading Room Electronically signed by: 
ISRRAEL GALICIA M.D. on 2019 12:05 AMRAD, ABDOMEN/KUB, 1 VIEW 
[id number] 18:36:00Reason for exam:-&gt;please check corepak placement.  call 
the nurse to see if she is ready before comingShould this be performed at the 
bedside?-&gt;YesFINAL REPORT PATIENT ID:   [id number] INDICATION:Confirm feeding 
tube placement. TECHNIQUE: Abdomen radiograph one view. FINDINGS / IMPRESSION:
Feeding tube takes a course of the stomach with the tip projecting over the 
gastric antrum (not postpyloric). Biliary catheter and stent are noted. 
Elevated right hemidiaphragm also noted. Signed: Isrrael Galicia 
Verified Date/Time:  2019 18:36:13 Reading Location: [facility] Consult 
Reading Room Electronically signed by: ISRRAEL GALICIA M.D. on 2019 06:36 PMRAD, ABDOMEN/KUB, 1 VIEW [id number] 14:38:00Reason for exam:-&gt;
abdominal pain, nausea and vomitingFINAL REPORT PATIENT ID:   [id number] TECHNIQUE
: Supine radiographs of the abdomen dated 3/5/2019 HISTORY: Abdominal pain, 
nausea and vomiting COMPARISON: CT scan of the abdomen and pelvis dated 2019. IMPRESSION:Pigtail catheter is seen in the right upper quadrant. A 
biliary drain is in place. There is a paucity of bowel gas in the abdomen. 
There is mild distention of the stomach with air. No freeintraperitoneal air. 
No abnormal soft tissue mass or calcification. Bones are unremarkable. Right 
pleural effusion is partially visualized. Signed: Kaylan Snell MDReport 
Verified Date/Time:  2019 14:38:19 Reading Location: [facility] Radiology 
Reading Room  Electronically signed by: KAYLAN SNELL MD on 2019 02:38 [id number] 07:53:00





 Test Item  Value  Reference Range  Comments

 

 MAGNESIUM (BEAKER) (test code=627)  1.9 mg/dL  1.6-2.6  



CBC W/PLT COUNT &amp; AUTO [id number] 07:43:00





 Test Item  Value  Reference Range  Comments

 

 WHITE BLOOD CELL COUNT (BEAKER) (test code=775)  8.8 K/ L  3.5-10.5  

 

 RED BLOOD CELL COUNT (BEAKER) (test code=761)  4.08 M/ L  4.63-6.08  

 

 HEMOGLOBIN (BEAKER) (test code=410)  12.0 GM/DL  13.7-17.5  

 

 HEMATOCRIT (BEAKER) (test code=411)  36.1 %  40.1-51.0  

 

 MEAN CORPUSCULAR VOLUME (BEAKER) (test code=753)  88.5 fL  79.0-92.2  

 

 MEAN CORPUSCULAR HEMOGLOBIN (BEAKER) (test  29.4 pg  25.7-32.2  



 ffsj=953)      

 

 MEAN CORPUSCULAR HEMOGLOBIN CONC (BEAKER) (test  33.2 GM/DL  32.3-36.5  



 code=752)      

 

 RED CELL DISTRIBUTION WIDTH (BEAKER) (test  15.7 %  11.6-14.4  



 code=412)      

 

 PLATELET COUNT (BEAKER) (test code=756)  334 K/CU MM  150-450  

 

 MEAN PLATELET VOLUME (BEAKER) (test code=754)  8.8 fL  9.4-12.4  

 

 NUCLEATED RED BLOOD CELLS (BEAKER) (test  0 /100 WBC  0-0  



 code=413)      

 

 NEUTROPHILS RELATIVE PERCENT (BEAKER) (test  77 %    



 code=429)      

 

 LYMPHOCYTES RELATIVE PERCENT (BEAKER) (test  13 %    



 code=430)      

 

 MONOCYTES RELATIVE PERCENT (BEAKER) (test  10 %    



 code=431)      

 

 EOSINOPHILS RELATIVE PERCENT (BEAKER) (test  0 %    



 code=432)      

 

 BASOPHILS RELATIVE PERCENT (BEAKER) (test  0 %    



 code=437)      

 

 NEUTROPHILS ABSOLUTE COUNT (BEAKER) (test  6.72 K/ L  1.78-5.38  



 code=670)      

 

 LYMPHOCYTES ABSOLUTE COUNT (BEAKER) (test  1.09 K/ L  1.32-3.57  



 code=414)      

 

 MONOCYTES ABSOLUTE COUNT (BEAKER) (test  0.87 K/ L  0.30-0.82  



 code=415)      

 

 EOSINOPHILS ABSOLUTE COUNT (BEAKER) (test  0.00 K/ L  0.04-0.54  



 code=416)      

 

 BASOPHILS ABSOLUTE COUNT (BEAKER) (test  0.02 K/ L  0.01-0.08  



 code=417)      

 

 IMMATURE GRANULOCYTES-RELATIVE PERCENT (BEAKER)  1 %  0-1  



 (test code=2801)      



COMPREHENSIVE METABOLIC [id number] 02:42:00





 Test Item  Value  Reference Range  Comments

 

 TOTAL PROTEIN (BEAKER)  6.2 gm/dL  6.0-8.3  



 (test code=770)      

 

 ALBUMIN (BEAKER) (test  3.0 g/dL  3.5-5.0  



 code=1145)      

 

 ALKALINE PHOSPHATASE  445 U/L    



 (BEAKER) (test code=346)      

 

 BILIRUBIN TOTAL (BEAKER)  4.2 mg/dL  0.2-1.2  



 (test code=377)      

 

 SODIUM (BEAKER) (test  136 meq/L  136-145  



 enmz=819)      

 

 POTASSIUM (BEAKER) (test  4.2 meq/L  3.5-5.1  



 code=379)      

 

 CHLORIDE (BEAKER) (test  100 meq/L    



 code=382)      

 

 CO2 (BEAKER) (test  18 meq/L  22-29  



 code=355)      

 

 BLOOD UREA NITROGEN  17 mg/dL  7-21  



 (BEAKER) (test code=354)      

 

 CREATININE (BEAKER) (test  0.90 mg/dL  0.57-1.25  



 code=358)      

 

 GLUCOSE RANDOM (BEAKER)  116 mg/dL    



 (test code=652)      

 

 CALCIUM (BEAKER) (test  8.6 mg/dL  8.4-10.2  



 code=697)      

 

 AST (SGOT) (BEAKER) (test  109 U/L  5-34  



 code=353)      

 

 ALT (SGPT) (BEAKER) (test  141 U/L  6-55  



 code=347)      

 

 EGFR (BEAKER) (test  86 mL/min/1.73 sq m    ESTIMATED GFR IS NOT AS



 code=1092)      ACCURATE AS CREATININE



       CLEARANCE IN PREDICTING



       GLOMERULAR FILTRATION



       RATE. ESTIMATED GFR IS



       NOT APPLICABLE FOR



       DIALYSIS PATIENTS.



Specimen slightly ictericLACTIC ACID, VENOUS, WHOLE [id number] 02:36:00





 Test Item  Value  Reference Range  Comments

 

 LACTATE BLOOD VENOUS (2) (BEAKER) (test  1.6 mmol/L  0.5-2.2  



 code=2872)      



Specimen slightly ictericANG, DRAINAGE, BILIARY, [id number] 17:59:00CT 
imaging and biliary obstruction likely sec to lymphoma , hematology recommends 
PTC to relive obstruction before starting chemo Reason for exam:-&gt;
obstructive jaundice , unable to place stent by GIFINAL REPORT PATIENT ID:   
[id number] History: Lymphoma, biliary obstruction. PROCEDURE: Following informed 
written consent, general endotracheal anesthesia was administered and the 
patient's right lateral abdominal wall was prepped and draped in the usual 
sterile manner. Using a 21-gauge Chiba needle, fluoroscopic guidance and a 
right mid axillary approach, access was gained to the intrahepatic bile duct. 
Percutaneous transhepatic cholangiogram was performed. A second 21-gauge Chiba 
needle was then used to access a more peripheral right intrahepatic duct. An 
018 wire was advanced through the needle centrally into the common hepatic 
duct. An AccuStick sheath was placed. Repeat contrast injection wasperformed to 
confirm position of the AccuStick sheath and the common hepatic duct. An 035 
angled Glidewire and 4 Emirati Berenstein catheter were used to carefully 
traverse the common bile duct and select the duodenum. The Glidewire was then 
exchanged through the catheter for an Amplatz wire. The tractwas dilated. A 9 
Emirati peel-away sheath was placed. An 8.5 Emirati internal/external biliary 
drainage catheter was then placed over the wire and through the peel-away 
sheath into position with its distal tip coiled in the duodenum and sideholes 
extending proximally into the common hepatic duct. Repeatcontrast injection was 
performed to confirm position of the catheter. The catheter was then secured to 
the skin using 2-0 silk suture and placed to external gravity bag drainage. 
Overall, the patient tolerated the procedure well without immediate 
complications and was discharged from the department tothe recovery room in 
stable condition. FINDINGS: Multiple images obtained during the procedure 
demonstrate mild intrahepatic biliary ductal dilatation. There is a high-grade 
long segment stricture of the distal common bile duct to the level of the 
ampulla. A small amount of contrast is seen flowing through the stricture into 
the common duct. Following placement of the internal/external biliary drainage 
catheter, the catheter is noted to lie in expected position with its distal tip 
coiled in the duodenum. Sideholes extending proximally into the common hepatic 
duct. IMPRESSION: 1. High-grade long segment stricture of the distal common 
bile duct causing mild intrahepatic biliary ductal dilatation as described 
above. This is consistent with patient's known lymphadenopathy in this area. 2. 
Successful uncomplicated placement of an 8.5 Emir[place] right internal/external 
biliary drainage catheter.   Total fluoroscopy time: 28.3 minutes. Estimated 
total patient dose reported as (Ka,r): 676 mGy. Signed: Renetta Andre MDReport 
Verified Date/Time:  2019 17:59:35 Reading Location: [place] [address] Angio 
Body Reading Room      Electronically signed by: RENETTA ANDRE M.D. on 2019 05:59 PMCOMPREHENSIVE METABOLIC [id number] 06:16:00





 Test Item  Value  Reference Range  Comments

 

 TOTAL PROTEIN (BEAKER)  6.3 gm/dL  6.0-8.3  



 (test code=770)      

 

 ALBUMIN (BEAKER) (test  3.1 g/dL  3.5-5.0  



 code=1145)      

 

 ALKALINE PHOSPHATASE  573 U/L    



 (BEAKER) (test code=346)      

 

 BILIRUBIN TOTAL (BEAKER)  5.4 mg/dL  0.2-1.2  



 (test code=377)      

 

 SODIUM (BEAKER) (test  137 meq/L  136-145  



 cvod=970)      

 

 POTASSIUM (BEAKER) (test  4.0 meq/L  3.5-5.1  



 code=379)      

 

 CHLORIDE (BEAKER) (test  101 meq/L    



 code=382)      

 

 CO2 (BEAKER) (test  22 meq/L  22-29  



 code=355)      

 

 BLOOD UREA NITROGEN  11 mg/dL  7-21  



 (BEAKER) (test code=354)      

 

 CREATININE (BEAKER) (test  0.73 mg/dL  0.57-1.25  



 code=358)      

 

 GLUCOSE RANDOM (BEAKER)  78 mg/dL    



 (test code=652)      

 

 CALCIUM (BEAKER) (test  9.2 mg/dL  8.4-10.2  



 code=697)      

 

 AST (SGOT) (BEAKER) (test  106 U/L  5-34  



 code=353)      

 

 ALT (SGPT) (BEAKER) (test  169 U/L  6-55  



 code=347)      

 

 EGFR (BEAKER) (test  110 mL/min/1.73 sq    ESTIMATED GFR IS NOT AS



 code=1092)  m    ACCURATE AS CREATININE



       CLEARANCE IN PREDICTING



       GLOMERULAR FILTRATION



       RATE. ESTIMATED GFR IS



       NOT APPLICABLE FOR



       DIALYSIS PATIENTS.



Specimen slightly ictericCBC W/PLT COUNT &amp; AUTO [id number] 06:10
:00





 Test Item  Value  Reference Range  Comments

 

 WHITE BLOOD CELL COUNT (BEAKER) (test code=775)  8.9 K/ L  3.5-10.5  

 

 RED BLOOD CELL COUNT (BEAKER) (test code=761)  4.23 M/ L  4.63-6.08  

 

 HEMOGLOBIN (BEAKER) (test code=410)  12.4 GM/DL  13.7-17.5  

 

 HEMATOCRIT (BEAKER) (test code=411)  37.2 %  40.1-51.0  

 

 MEAN CORPUSCULAR VOLUME (BEAKER) (test code=753)  87.9 fL  79.0-92.2  

 

 MEAN CORPUSCULAR HEMOGLOBIN (BEAKER) (test  29.3 pg  25.7-32.2  



 uzlq=708)      

 

 MEAN CORPUSCULAR HEMOGLOBIN CONC (BEAKER) (test  33.3 GM/DL  32.3-36.5  



 code=752)      

 

 RED CELL DISTRIBUTION WIDTH (BEAKER) (test  15.4 %  11.6-14.4  



 code=412)      

 

 PLATELET COUNT (BEAKER) (test code=756)  344 K/CU MM  150-450  

 

 MEAN PLATELET VOLUME (BEAKER) (test code=754)  9.0 fL  9.4-12.4  

 

 NUCLEATED RED BLOOD CELLS (BEAKER) (test  0 /100 WBC  0-0  



 code=413)      

 

 NEUTROPHILS RELATIVE PERCENT (BEAKER) (test  73 %    



 code=429)      

 

 LYMPHOCYTES RELATIVE PERCENT (BEAKER) (test  15 %    



 code=430)      

 

 MONOCYTES RELATIVE PERCENT (BEAKER) (test  10 %    



 code=431)      

 

 EOSINOPHILS RELATIVE PERCENT (BEAKER) (test  1 %    



 code=432)      

 

 BASOPHILS RELATIVE PERCENT (BEAKER) (test  0 %    



 code=437)      

 

 NEUTROPHILS ABSOLUTE COUNT (BEAKER) (test  6.53 K/ L  1.78-5.38  



 code=670)      

 

 LYMPHOCYTES ABSOLUTE COUNT (BEAKER) (test  1.34 K/ L  1.32-3.57  



 code=414)      

 

 MONOCYTES ABSOLUTE COUNT (BEAKER) (test  0.88 K/ L  0.30-0.82  



 code=415)      

 

 EOSINOPHILS ABSOLUTE COUNT (BEAKER) (test  0.06 K/ L  0.04-0.54  



 code=416)      

 

 BASOPHILS ABSOLUTE COUNT (BEAKER) (test  0.02 K/ L  0.01-0.08  



 code=417)      

 

 IMMATURE GRANULOCYTES-RELATIVE PERCENT (BEAKER)  1 %  0-1  



 (test code=2801)      



[id number] 06:09:00





 Test Item  Value  Reference Range  Comments

 

 MAGNESIUM (BEAKER) (test code=627)  1.7 mg/dL  1.6-2.6  



COMPREHENSIVE METABOLIC [id number] 06:14:00





 Test Item  Value  Reference Range  Comments

 

 TOTAL PROTEIN (BEAKER)  5.9 gm/dL  6.0-8.3  



 (test code=770)      

 

 ALBUMIN (BEAKER) (test  2.9 g/dL  3.5-5.0  



 code=1145)      

 

 ALKALINE PHOSPHATASE  604 U/L    



 (BEAKER) (test code=346)      

 

 BILIRUBIN TOTAL (BEAKER)  8.0 mg/dL  0.2-1.2  



 (test code=377)      

 

 SODIUM (BEAKER) (test  136 meq/L  136-145  



 bkfm=626)      

 

 POTASSIUM (BEAKER) (test  3.9 meq/L  3.5-5.1  



 code=379)      

 

 CHLORIDE (BEAKER) (test  102 meq/L    



 code=382)      

 

 CO2 (BEAKER) (test  24 meq/L  22-29  



 code=355)      

 

 BLOOD UREA NITROGEN  7 mg/dL  7-21  



 (BEAKER) (test code=354)      

 

 CREATININE (BEAKER) (test  0.64 mg/dL  0.57-1.25  



 code=358)      

 

 GLUCOSE RANDOM (BEAKER)  64 mg/dL    



 (test code=652)      

 

 CALCIUM (BEAKER) (test  8.9 mg/dL  8.4-10.2  



 code=697)      

 

 AST (SGOT) (BEAKER) (test  129 U/L  5-34  



 code=353)      

 

 ALT (SGPT) (BEAKER) (test  202 U/L  6-55  



 code=347)      

 

 EGFR (BEAKER) (test  128 mL/min/1.73 sq    ESTIMATED GFR IS NOT AS



 code=1092)  m    ACCURATE AS CREATININE



       CLEARANCE IN PREDICTING



       GLOMERULAR FILTRATION



       RATE. ESTIMATED GFR IS



       NOT APPLICABLE FOR



       DIALYSIS PATIENTS.



Specimen moderately ictericU/S, TESTICULAR, WITH [id number] 01:46:
00Reason for exam:-&gt;left testicaular painShould this be performed at the 
bedside?-&gt;NoFINAL REPORT PATIENT ID:   [id number] U/S, TESTICULAR, WITH 
DOPPLER CLINICAL INDICATION: left testicaular pain COMPARISON: None TECHNIQUE: 
The scrotum was evaluated using real-time gray scale and color and spectral 
Doppler sonography. FINDINGS: Right testis:     Size: 3.7 x 1.7 x 3.1 cm     
Echotexture:Normal with no focal lesions.     Color Doppler: Preserved vascular 
flow. A cystic structure anterior to the epididymis (image 27) and favored to 
be secondary from the epididymis measures 2.9 x 0.8 x 1.9 cm. It is unclear if 
this represents a fluid-filled loop of bowel or another fluid-filled structure. 
No internal vascularity or internal echoes are identified.  Left testis:     
Size: 3.7 x 1.7 x 2.8cm     Echotexture: Normal with no focal lesions.     
Color Doppler: Preserved vascular flow.  Epididymides:     Size: Normal without 
focal lesion.     Color Doppler: Preserved vascular flow. Hydrocele: Trace 
hydrocele bilaterally Varicocele: None Additional findings: None.  IMPRESSION: 
  Trace hydroceles. No etiology of left testicular pain identified. Signed: 
Enrico Rolle MDReport Verified Date/Time:  2019 01:46:19 Reading 
Location: [address] Neuro Reading Room  Electronically signed by: ENRICO ROLLE MD on 2019 01:46 [id number] 06:50:00





 Test Item  Value  Reference Range  Comments

 

 MAGNESIUM (BEAKER) (test code=627)  1.9 mg/dL  1.6-2.6  



BASIC METABOLIC [id number] 06:50:00





 Test Item  Value  Reference Range  Comments

 

 SODIUM (BEAKER) (test  136 meq/L  136-145  



 pgrw=221)      

 

 POTASSIUM (BEAKER) (test  3.9 meq/L  3.5-5.1  



 code=379)      

 

 CHLORIDE (BEAKER) (test  103 meq/L    



 code=382)      

 

 CO2 (BEAKER) (test  21 meq/L  22-29  



 code=355)      

 

 BLOOD UREA NITROGEN  9 mg/dL  7-21  



 (BEAKER) (test code=354)      

 

 CREATININE (BEAKER) (test  0.78 mg/dL  0.57-1.25  



 code=358)      

 

 GLUCOSE RANDOM (BEAKER)  80 mg/dL    



 (test code=652)      

 

 CALCIUM (BEAKER) (test  9.0 mg/dL  8.4-10.2  



 code=697)      

 

 EGFR (BEAKER) (test  102 mL/min/1.73 sq m    ESTIMATED GFR IS NOT AS



 code=1092)      ACCURATE AS CREATININE



       CLEARANCE IN PREDICTING



       GLOMERULAR FILTRATION



       RATE. ESTIMATED GFR IS



       NOT APPLICABLE FOR



       DIALYSIS PATIENTS.



Specimen moderately ictericHEPATIC FUNCTION [id number] 06:50:00





 Test Item  Value  Reference Range  Comments

 

 TOTAL PROTEIN (BEAKER) (test code=770)  5.9 gm/dL  6.0-8.3  

 

 ALBUMIN (BEAKER) (test code=1145)  3.2 g/dL  3.5-5.0  

 

 BILIRUBIN TOTAL (BEAKER) (test code=377)  8.3 mg/dL  0.2-1.2  

 

 BILIRUBIN DIRECT (BEAKER) (test code=706)  6.2 mg/dL  0.1-0.5  

 

 ALKALINE PHOSPHATASE (BEAKER) (test code=346)  709 U/L    

 

 AST (SGOT) (BEAKER) (test code=353)  230 U/L  5-34  

 

 ALT (SGPT) (BEAKER) (test code=347)  323 U/L  6-55  



Specimen moderately ictericPROTHROMBIN [id number] 06:21:00





 Test Item  Value  Reference Range  Comments

 

 PROTIME (BEAKER) (test code=759)  13.8 seconds  11.7-14.7  

 

 INR (BEAKER) (test code=370)  1.0  <=5.9  



RECOMMENDED COUMADIN/WARFARIN INR THERAPY RANGESSTANDARD DOSE: 2.0 - 3.0   
Includes: PROPHYLAXIS forvenous thrombosis, systemic embolization; TREATMENT 
for venous thrombosis and/or pulmonary embolus.HIGH RISK: Target INR is 2.5-3.5 
for patients with mechanical heart valves.CBC W/PLT COUNT &amp; AUTO 
[id number] 06:02:00





 Test Item  Value  Reference Range  Comments

 

 WHITE BLOOD CELL COUNT (BEAKER) (test code=775)  7.5 K/ L  3.5-10.5  

 

 RED BLOOD CELL COUNT (BEAKER) (test code=761)  4.15 M/ L  4.63-6.08  

 

 HEMOGLOBIN (BEAKER) (test code=410)  12.4 GM/DL  13.7-17.5  

 

 HEMATOCRIT (BEAKER) (test code=411)  36.7 %  40.1-51.0  

 

 MEAN CORPUSCULAR VOLUME (BEAKER) (test code=753)  88.4 fL  79.0-92.2  

 

 MEAN CORPUSCULAR HEMOGLOBIN (BEAKER) (test  29.9 pg  25.7-32.2  



 lgxl=864)      

 

 MEAN CORPUSCULAR HEMOGLOBIN CONC (BEAKER) (test  33.8 GM/DL  32.3-36.5  



 code=752)      

 

 RED CELL DISTRIBUTION WIDTH (BEAKER) (test  14.7 %  11.6-14.4  



 code=412)      

 

 PLATELET COUNT (BEAKER) (test code=756)  288 K/CU MM  150-450  

 

 MEAN PLATELET VOLUME (BEAKER) (test code=754)  9.1 fL  9.4-12.4  

 

 NUCLEATED RED BLOOD CELLS (BEAKER) (test  0 /100 WBC  0-0  



 code=413)      

 

 NEUTROPHILS RELATIVE PERCENT (BEAKER) (test  73 %    



 code=429)      

 

 LYMPHOCYTES RELATIVE PERCENT (BEAKER) (test  13 %    



 code=430)      

 

 MONOCYTES RELATIVE PERCENT (BEAKER) (test  12 %    



 code=431)      

 

 EOSINOPHILS RELATIVE PERCENT (BEAKER) (test  1 %    



 code=432)      

 

 BASOPHILS RELATIVE PERCENT (BEAKER) (test  0 %    



 code=437)      

 

 NEUTROPHILS ABSOLUTE COUNT (BEAKER) (test  5.44 K/ L  1.78-5.38  



 code=670)      

 

 LYMPHOCYTES ABSOLUTE COUNT (BEAKER) (test  0.98 K/ L  1.32-3.57  



 code=414)      

 

 MONOCYTES ABSOLUTE COUNT (BEAKER) (test  0.90 K/ L  0.30-0.82  



 code=415)      

 

 EOSINOPHILS ABSOLUTE COUNT (BEAKER) (test  0.06 K/ L  0.04-0.54  



 code=416)      

 

 BASOPHILS ABSOLUTE COUNT (BEAKER) (test  0.03 K/ L  0.01-0.08  



 code=417)      

 

 IMMATURE GRANULOCYTES-RELATIVE PERCENT (BEAKER)  1 %  0-1  



 (test code=2801)      



FLOW CYTOMETRY [id number] 16:36:00





 Test Item  Value  Reference Range  Comments

 

 FLOW CYTOMETRY RESULT POINTER (ALEYDA)  See Separate Report    



 (test code=2758)      

 

 FLOW CYTOMETRY AP CASE # (ALEYDA) (test  [id number]    



 wggf=5502)      



FL, [id number] 14:28:00Reason for exam:-&gt;cbd stonesFINAL REPORT PATIENT 
ID:   [id number] Fluoroscopic spot imaging was performed at the time of the 
procedure by the ordering service. This examination is nondiagnostic. 
Fluoroscopy was not performed by theundersigned, and the radiologist was not 
present at the time of examination. Interpretation of the images was not 
requested. Total fluoroscopy time: 4.4 minutes Total number of films: 1 Please 
refer tothe referring physician's procedure report for complete detail.  Signed
: Pily Balbuena Verified Date/Time:  2019 14:28:48 Reading Location
: [address] Consult Reading Room      Electronically signed by: PILY BALBUENA M.D. on 2019 02:28 PMFLOW [id number] 14:11:00Flow Cytometry 
Report                             Case: [id number]                             
    Authorizing Provider:  Chuck Melgoza MD    Collected:            [id number]            Ordering Location:     [address]     Received
:            2019 1047              Service                              
                                      Pathologist:           Radha Mejias MD                                                 Specimen:    Other  
                                                                              
PANCREATIC MASS, FINE NEEDLE ASPIRATION, FLOW CYTOMETRY:-MONOTYPIC, CD10+ B 
CELL POPULATION (92% OF TOTAL EVENTS)-NO ABERRANT T CELL POPULATION-SEE 
COMMENTElectronically signed by Radha Mejias MD on 2019at  2:11 
PMThese results would support involvement by a B cell lymphoid neoplasm with a 
germinal center phenotype, however, should be correlated with the morphologic 
and other features for definitive diagnosis and subclassification. 
[id number] jaundice, lymphadenopathyPancreatic FNACD8, surface-kappa, CD56
, surface-lambda, CD5, CD19, CD10, CD3, CD20, CD4, LR67Kzrghams Viability: 96.0
%      Number of Events Acquired: [id number] B cell population identified (
92% of cellularity) with light scatter characteristics of larger cells and the 
following phenotype:POSITIVE: lambda light chain restricted, CD19, CD20, CD10, 
[id number]: CD5, CD3 In addition, the following populations are identified: 
Lymphocytes: Bright CD45+ lymphocytes comprise 96.5% of total cells. T cells 
show a CD4:CD8 ratio of 1.3 and normal expression of the pan T cell antigens 
CD3 and CD5. The abnormal B cell population isdescribed above.  Myeloid/
monocytic populations: As identified by CD45 and light scatter characteristics, 
granulocytes comprise 3% of cells analyzed, and monocytes comprise less than 0.1
% of total cells.  The remaining events analyzed represent nonviable cells, non-
hematolymphoid cells, and debris.These tests were developed and their 
performance characteristics determined by [facility].  
They have not been cleared or approved by the U.S. Food and Drug 
Administration. The FDA has determined that such clearance or approval is not 
necessary. It should not be regarded as investigational or for research. This 
laboratory is certified under the Clinical Laboratory Improvement Amendments of 
1988 ("CLIA") as qualified to perform high-complexity clinical testing.FINE 
NEEDLE ASPIRATE (FNA) [id number] 12:01:00





 Test Item  Value  Reference Range  Comments

 

 CYTOLOGY RESULT POINTER (BEAKER) (test  See Separate Report    



 code=2629)      



POCT-GLUCOSE [id number] 09:41:00





 Test Item  Value  Reference Range  Comments

 

 POC-GLUCOSE METER (BEAKER)  70 mg/dL    TESTED AT [address]



 (test gdoa=4425)      [facility] [address]



PROTHROMBIN [id number] 05:19:00





 Test Item  Value  Reference Range  Comments

 

 PROTIME (BEAKER) (test code=759)  13.3 seconds  11.7-14.7  

 

 INR (BEAKER) (test code=370)  1.0  <=5.9  



RECOMMENDED COUMADIN/WARFARIN INR THERAPY RANGESSTANDARD DOSE: 2.0 - 3.0   
Includes: PROPHYLAXIS forvenous thrombosis, systemic embolization; TREATMENT 
for venous thrombosis and/or pulmonary embolus.HIGH RISK: Target INR is 2.5-3.5 
for patients with mechanical heart valves.[id number] 05:08:00





 Test Item  Value  Reference Range  Comments

 

 MAGNESIUM (BEAKER) (test code=627)  2.1 mg/dL  1.6-2.6  



BASIC METABOLIC [id number] 05:08:00





 Test Item  Value  Reference Range  Comments

 

 SODIUM (BEAKER) (test  134 meq/L  136-145  



 ytyd=060)      

 

 POTASSIUM (BEAKER) (test  4.1 meq/L  3.5-5.1  



 code=379)      

 

 CHLORIDE (BEAKER) (test  101 meq/L    



 code=382)      

 

 CO2 (BEAKER) (test  21 meq/L  22-29  



 code=355)      

 

 BLOOD UREA NITROGEN  9 mg/dL  7-21  



 (BEAKER) (test code=354)      

 

 CREATININE (BEAKER) (test  0.72 mg/dL  0.57-1.25  



 code=358)      

 

 GLUCOSE RANDOM (BEAKER)  74 mg/dL    



 (test code=652)      

 

 CALCIUM (BEAKER) (test  8.5 mg/dL  8.4-10.2  



 code=697)      

 

 EGFR (BEAKER) (test  112 mL/min/1.73 sq m    ESTIMATED GFR IS NOT AS



 code=1092)      ACCURATE AS CREATININE



       CLEARANCE IN PREDICTING



       GLOMERULAR FILTRATION



       RATE. ESTIMATED GFR IS



       NOT APPLICABLE FOR



       DIALYSIS PATIENTS.



Specimen moderately ictericHEPATIC FUNCTION [id number] 05:08:00





 Test Item  Value  Reference Range  Comments

 

 TOTAL PROTEIN (BEAKER) (test code=770)  6.0 gm/dL  6.0-8.3  

 

 ALBUMIN (BEAKER) (test code=1145)  3.3 g/dL  3.5-5.0  

 

 BILIRUBIN TOTAL (BEAKER) (test code=377)  7.0 mg/dL  0.2-1.2  

 

 BILIRUBIN DIRECT (BEAKER) (test code=706)  5.5 mg/dL  0.1-0.5  

 

 ALKALINE PHOSPHATASE (BEAKER) (test code=346)  669 U/L    

 

 AST (SGOT) (BEAKER) (test code=353)  233 U/L  5-34  

 

 ALT (SGPT) (BEAKER) (test code=347)  356 U/L  6-55  



Specimen moderately ictericCBC W/PLT COUNT &amp; AUTO [id number] 04:
52:00





 Test Item  Value  Reference Range  Comments

 

 WHITE BLOOD CELL COUNT (BEAKER) (test code=775)  8.4 K/ L  3.5-10.5  

 

 RED BLOOD CELL COUNT (BEAKER) (test code=761)  4.30 M/ L  4.63-6.08  

 

 HEMOGLOBIN (BEAKER) (test code=410)  12.5 GM/DL  13.7-17.5  

 

 HEMATOCRIT (BEAKER) (test code=411)  38.2 %  40.1-51.0  

 

 MEAN CORPUSCULAR VOLUME (BEAKER) (test code=753)  88.8 fL  79.0-92.2  

 

 MEAN CORPUSCULAR HEMOGLOBIN (BEAKER) (test  29.1 pg  25.7-32.2  



 rotu=832)      

 

 MEAN CORPUSCULAR HEMOGLOBIN CONC (BEAKER) (test  32.7 GM/DL  32.3-36.5  



 code=752)      

 

 RED CELL DISTRIBUTION WIDTH (BEAKER) (test  14.2 %  11.6-14.4  



 code=412)      

 

 PLATELET COUNT (BEAKER) (test code=756)  286 K/CU MM  150-450  

 

 MEAN PLATELET VOLUME (BEAKER) (test code=754)  8.9 fL  9.4-12.4  

 

 NUCLEATED RED BLOOD CELLS (BEAKER) (test  0 /100 WBC  0-0  



 code=413)      

 

 NEUTROPHILS RELATIVE PERCENT (BEAKER) (test  77 %    



 code=429)      

 

 LYMPHOCYTES RELATIVE PERCENT (BEAKER) (test  11 %    



 code=430)      

 

 MONOCYTES RELATIVE PERCENT (BEAKER) (test  10 %    



 code=431)      

 

 EOSINOPHILS RELATIVE PERCENT (BEAKER) (test  1 %    



 code=432)      

 

 BASOPHILS RELATIVE PERCENT (BEAKER) (test  1 %    



 code=437)      

 

 NEUTROPHILS ABSOLUTE COUNT (BEAKER) (test  6.46 K/ L  1.78-5.38  



 code=670)      

 

 LYMPHOCYTES ABSOLUTE COUNT (BEAKER) (test  0.91 K/ L  1.32-3.57  



 code=414)      

 

 MONOCYTES ABSOLUTE COUNT (BEAKER) (test  0.84 K/ L  0.30-0.82  



 code=415)      

 

 EOSINOPHILS ABSOLUTE COUNT (BEAKER) (test  0.11 K/ L  0.04-0.54  



 code=416)      

 

 BASOPHILS ABSOLUTE COUNT (BEAKER) (test  0.05 K/ L  0.01-0.08  



 code=417)      

 

 IMMATURE GRANULOCYTES-RELATIVE PERCENT (BEAKER)  0 %  0-1  



 (test code=2801)      



CT, CHEST, WITH [id number] 21:26:00FINAL REPORT PATIENT ID:   [id number] 
History: Adenopathy, suspected lymphoma. TECHNIQUE: Helical CT of the wrist, 
abdomen and pelvis were performed following the uneventful administration of 
intravenousand positive oral contrast utilizing multiple windows, sagittal and 
coronal reformations. This exam was performed according to our departmental 
diverticulitis. Optimization program which includes automated exposure control, 
adjustment of the mA and/or KV according to the patient's size and/or use of 
iterative reconstruction technique. FINDINGS: No comparisons are available. 
Chest CT: Extensive mediastinal adenopathy is present, the largest nodes in the 
subcarinal and pretracheal spaces. Enlarged left supraclavicular lymph nodes 
are also identified. The heart, mediastinum and great vessels are otherwise 
unremarkable. Thyroid gland is unremarkable. Central airways are patent. There 
is slight increased opacity in the right lower lobe, possibly atelectasis and/
or lung scarring. Lungs are otherwise clear. No suspicious pulmonary nodules or 
masses. Bones are unremarkable. Abdominal CT: Mild intrahepatic biliary ductal 
dilatation is present. The gallbladder is mildly distended. No visible stones 
or sludge. The liver and remaining solid abdominal organs including the spleen, 
kidneys, visible portions of the pancreas and adrenal glands are otherwise 
unremarkable. The stomach is mildly distended with contrast and fluid. The 
colon is decompressed and its wall is thickened, possibly related to its 
decompressed state. Scattered colonic diverticula are present. Visible portions 
of the small bowel are normal. No evidence for obstruction. No free air or 
evidence for perforation. No abdominal fluid collections. Extensive and bulky 
retroperitoneal adenopathy is present. The largest rey mass measures up to 10 
x 11 cm and encases both the superior mesenteric artery and vein. It also 
partially effaces or compresses the inferior vena cava and the central splenic 
vein. This retroperitoneal adenopathy also causes compression of the distal 
duodenum, possibly accounting for the mildly dilated stomach. Vesselsare 
unremarkable. Bones are unremarkable.  Pelvic CT: Colonic diverticula are 
present. The colon is decompressed and there are mild inflammatory changes of 
the surrounding fat in the region of the sigmoid colon. Given the diffuse hazy 
appearance of the mesenteric fat, this is unlikely to represent a focal 
inflammatory process such as diverticulitis. Pelvic organs including the rectum
, prostate gland,urinary bladder and other visible portions of the bowel are 
unremarkable. The appendix is not definitively identified and may be surgically 
absent. No indirect signs for acute appendicitis. No pelvic adenopathy is seen. 
No pelvic fluid collections. Vessels and bones are unremarkable. IMPRESSION: 1. 
Extensive and bulky mediastinal and retroperitoneal adenopathy as described 
above, most consistent withlymphoma. 2. Scattered colonic diverticula without 
definitive evidence for acute diverticulitis. Extensive stranding of the 
mesenteric fat is seen, possibly representing edema. 3. Mildly distended 
gallbladder and mild intrahepatic biliary ductal dilatation. 4. Focal opacity 
in the right lower lobe, likely atelectasis. 5. Mildly distended stomach and 
partially effaced and possibly obstructed distal duodenum. Signed: Renetta Andre MDReport Verified Date/Time:  2019 21:26:56 Reading Location: [facility] 
Diagnostic Imaging Reading Room - [place] [id number]      Electronically signed by: 
RENETTA ANDRE M.D.on 2019 09:26 PMCT, [id number] 21:26:00FINAL 
REPORT PATIENT ID:   [id number] History: Adenopathy, suspected lymphoma. TECHNIQUE
: Helical CT of the wrist, abdomen and pelvis were performed following the 
uneventful administration of intravenousand positive oral contrast utilizing 
multiple windows, sagittal and coronal reformations. This exam was performed 
according to our departmental diverticulitis. Optimization program which 
includes automated exposure control, adjustment of the mA and/or KV according 
to the patient's size and/or use of iterative reconstruction technique. FINDINGS
: No comparisons are available. Chest CT: Extensive mediastinal adenopathy is 
present, the largest nodes in the subcarinal and pretracheal spaces. Enlarged 
left supraclavicular lymph nodes are also identified. The heart, mediastinum 
and great vessels are otherwise unremarkable. Thyroid gland is unremarkable. 
Central airways are patent. There is slight increased opacity in the right 
lower lobe, possibly atelectasis and/or lung scarring. Lungs are otherwise 
clear. No suspicious pulmonary nodules or masses. Bones are unremarkable. 
Abdominal CT: Mild intrahepatic biliary ductal dilatation is present. The 
gallbladder is mildly distended. No visible stones or sludge. The liver and 
remaining solid abdominal organs including the spleen, kidneys, visible 
portions of the pancreas and adrenal glands are otherwise unremarkable. The 
stomach is mildly distended with contrast and fluid. The colon is decompressed 
and its wall is thickened, possibly related to its decompressed state. 
Scattered colonic diverticula are present. Visible portions of the small bowel 
are normal. No evidence for obstruction. No free air or evidence for 
perforation. No abdominal fluid collections. Extensive and bulky 
retroperitoneal adenopathy is present. The largest rey mass measures up to 10 
x 11 cm and encases both the superior mesenteric artery and vein. It also 
partially effaces or compresses the inferior vena cava and the central splenic 
vein. This retroperitoneal adenopathy also causes compression of the distal 
duodenum, possibly accounting for the mildly dilated stomach. Vesselsare 
unremarkable. Bones are unremarkable.  Pelvic CT: Colonic diverticula are 
present. The colon is decompressed and there are mild inflammatory changes of 
the surrounding fat in the region of the sigmoid colon. Given the diffuse hazy 
appearance of the mesenteric fat, this is unlikely to represent a focal 
inflammatory process such as diverticulitis. Pelvic organs including the rectum
, prostate gland,urinary bladder and other visible portions of the bowel are 
unremarkable. The appendix is not definitively identified and may be surgically 
absent. No indirect signs for acute appendicitis. No pelvic adenopathy is seen. 
No pelvic fluid collections. Vessels and bones are unremarkable. IMPRESSION: 1. 
Extensive and bulky mediastinal and retroperitoneal adenopathy as described 
above, most consistent withlymphoma. 2. Scattered colonic diverticula without 
definitive evidence for acute diverticulitis. Extensive stranding of the 
mesenteric fat is seen, possibly representing edema. 3. Mildly distended 
gallbladder and mild intrahepatic biliary ductal dilatation. 4. Focal opacity 
in the right lower lobe, likely atelectasis. 5. Mildly distended stomach and 
partially effaced and possibly obstructed distal duodenum. Signed: Renetta Andre MDReport Verified Date/Time:  2019 21:26:56 Reading Location: [facility] 
Diagnostic Imaging Reading Room - [place] [address]      Electronically signed by: 
RENETTA ANDRE M.D.on 2019 09:26 PMHEPATITIS B [id number] 21:02:00





 Test Item  Value  Reference Range  Comments

 

 HEPATITIS B CORE TOTAL ANTIBODY (BEAKER) (test  Reactive  Nonreactive  



 code=497)      

 

 HEPATITIS B SURFACE ANTIBODY (BEAKER) (test  735.9 mIU/mL  <8.0  



 code=647)      

 

 HEPATITIS B SURFACE ANTIGEN (2) (BEAKER) (test  Nonreactive  Nonreactive  



 code=2585)      



HEPATITIS C [id number] 20:19:00





 Test Item  Value  Reference Range  Comments

 

 HEPATITIS C ANTIBODY (BEAKER) (test code=367)  Nonreactive  Nonreactive  



URINALYSIS W/ [id number] 16:41:00





 Test Item  Value  Reference Range  Comments

 

 COLOR (BEAKER) (test code=470)  Dark Yellow    

 

 CLARITY (BEAKER) (test code=469)  Clear    

 

 SPECIFIC GRAVITY UA (BEAKER) (test code=468)  1.026  1.001-1.035  

 

 PH UA (BEAKER) (test code=467)  6.5  5.0-8.0  

 

 PROTEIN UA (BEAKER) (test code=464)  30 mg/dL  Negative  

 

 GLUCOSE UA (BEAKER) (test code=365)  Negative  Negative  

 

 KETONES UA (BEAKER) (test code=371)  >150 mg/dL  Negative  

 

 BILIRUBIN UA (BEAKER) (test code=462)  Positive  Negative  

 

 BLOOD UA (BEAKER) (test code=461)  Negative  Negative  

 

 NITRITE UA (BEAKER) (test code=465)  Negative  Negative  

 

 LEUKOCYTE ESTERASE UA (BEAKER) (test code=466)  Negative  Negative  

 

 UROBILINOGEN UA (BEAKER) (test code=463)  2.0 mg/dL  0.2-1.0  

 

 RBC UA (BEAKER) (test code=519)  1 /HPF    

 

 WBC UA (BEAKER) (test code=520)  1 /HPF    

 

 MUCUS (BEAKER) (test code=1574)  Occasional    

 

 SQUAMOUS EPITHELIAL (BEAKER) (test code=516)  < /HPF    

 

 SOURCE(BEAKER) (test code=2795)      



HIV-1 ANTIGEN WITH HIV-1/2 [id number] 06:55:00





 Test Item  Value  Reference Range  Comments

 

 HIV-1 ANTIGEN WITH HIV 1\T\2 ANTIBODY (2)  Nonreactive  Nonreactive  



 (BEAKER) (test code=2586)      



[id number] 06:42:00





 Test Item  Value  Reference Range  Comments

 

 MAGNESIUM (BEAKER) (test code=627)  1.6 mg/dL  1.6-2.6  



BASIC METABOLIC [id number] 06:42:00





 Test Item  Value  Reference Range  Comments

 

 SODIUM (BEAKER) (test  133 meq/L  136-145  



 yisb=586)      

 

 POTASSIUM (BEAKER) (test  4.0 meq/L  3.5-5.1  



 code=379)      

 

 CHLORIDE (BEAKER) (test  98 meq/L    



 code=382)      

 

 CO2 (BEAKER) (test  22 meq/L  22-29  



 code=355)      

 

 BLOOD UREA NITROGEN  11 mg/dL  7-21  



 (BEAKER) (test code=354)      

 

 CREATININE (BEAKER) (test  0.76 mg/dL  0.57-1.25  



 code=358)      

 

 GLUCOSE RANDOM (BEAKER)  65 mg/dL    



 (test code=652)      

 

 CALCIUM (BEAKER) (test  8.4 mg/dL  8.4-10.2  



 code=697)      

 

 EGFR (BEAKER) (test  105 mL/min/1.73 sq m    ESTIMATED GFR IS NOT AS



 code=1092)      ACCURATE AS CREATININE



       CLEARANCE IN PREDICTING



       GLOMERULAR FILTRATION



       RATE. ESTIMATED GFR IS



       NOT APPLICABLE FOR



       DIALYSIS PATIENTS.



Specimen moderately ictericHEPATIC FUNCTION [id number] 06:42:00





 Test Item  Value  Reference Range  Comments

 

 TOTAL PROTEIN (BEAKER) (test code=770)  6.3 gm/dL  6.0-8.3  

 

 ALBUMIN (BEAKER) (test code=1145)  3.5 g/dL  3.5-5.0  

 

 BILIRUBIN TOTAL (BEAKER) (test code=377)  6.0 mg/dL  0.2-1.2  

 

 BILIRUBIN DIRECT (BEAKER) (test code=706)  4.8 mg/dL  0.1-0.5  

 

 ALKALINE PHOSPHATASE (BEAKER) (test code=346)  609 U/L    

 

 AST (SGOT) (BEAKER) (test code=353)  221 U/L  5-34  

 

 ALT (SGPT) (BEAKER) (test code=347)  416 U/L  6-55  



Specimen moderately ictericLACTATE DEHYDROGENASE (LDH)2019 06:42:00





 Test Item  Value  Reference Range  Comments

 

 LACTATE DEHYDROGENASE (BEAKER) (test code=635)  474 U/L  125-220  



CBC W/PLT COUNT &amp; AUTO [id number] 06:38:00





 Test Item  Value  Reference Range  Comments

 

 WHITE BLOOD CELL COUNT (BEAKER) (test code=775)  8.3 K/ L  3.5-10.5  

 

 RED BLOOD CELL COUNT (BEAKER) (test code=761)  4.54 M/ L  4.63-6.08  

 

 HEMOGLOBIN (BEAKER) (test code=410)  13.3 GM/DL  13.7-17.5  

 

 HEMATOCRIT (BEAKER) (test code=411)  39.7 %  40.1-51.0  

 

 MEAN CORPUSCULAR VOLUME (BEAKER) (test code=753)  87.4 fL  79.0-92.2  

 

 MEAN CORPUSCULAR HEMOGLOBIN (BEAKER) (test  29.3 pg  25.7-32.2  



 uxhs=542)      

 

 MEAN CORPUSCULAR HEMOGLOBIN CONC (BEAKER) (test  33.5 GM/DL  32.3-36.5  



 code=752)      

 

 RED CELL DISTRIBUTION WIDTH (BEAKER) (test  13.5 %  11.6-14.4  



 code=412)      

 

 PLATELET COUNT (BEAKER) (test code=756)  321 K/CU MM  150-450  

 

 MEAN PLATELET VOLUME (BEAKER) (test code=754)  9.2 fL  9.4-12.4  

 

 NUCLEATED RED BLOOD CELLS (BEAKER) (test  0 /100 WBC  0-0  



 code=413)      

 

 NEUTROPHILS RELATIVE PERCENT (BEAKER) (test  74 %    



 code=429)      

 

 LYMPHOCYTES RELATIVE PERCENT (BEAKER) (test  15 %    



 code=430)      

 

 MONOCYTES RELATIVE PERCENT (BEAKER) (test  8 %    



 code=431)      

 

 EOSINOPHILS RELATIVE PERCENT (BEAKER) (test  1 %    



 code=432)      

 

 BASOPHILS RELATIVE PERCENT (BEAKER) (test  1 %    



 code=437)      

 

 NEUTROPHILS ABSOLUTE COUNT (BEAKER) (test  6.17 K/ L  1.78-5.38  



 code=670)      

 

 LYMPHOCYTES ABSOLUTE COUNT (BEAKER) (test  1.27 K/ L  1.32-3.57  



 code=414)      

 

 MONOCYTES ABSOLUTE COUNT (BEAKER) (test  0.70 K/ L  0.30-0.82  



 code=415)      

 

 EOSINOPHILS ABSOLUTE COUNT (BEAKER) (test  0.09 K/ L  0.04-0.54  



 code=416)      

 

 BASOPHILS ABSOLUTE COUNT (BEAKER) (test  0.05 K/ L  0.01-0.08  



 code=417)      

 

 IMMATURE GRANULOCYTES-RELATIVE PERCENT (BEAKER)  1 %  0-1  



 (test code=2801)      



PROTHROMBIN [id number] 06:35:00





 Test Item  Value  Reference Range  Comments

 

 PROTIME (BEAKER) (test code=759)  12.6 seconds  11.7-14.7  

 

 INR (BEAKER) (test code=370)  0.9  <=5.9  



RECOMMENDED COUMADIN/WARFARIN INR THERAPY RANGESSTANDARD DOSE: 2.0 - 3.0   
Includes: PROPHYLAXIS forvenous thrombosis, systemic embolization; TREATMENT 
for venous thrombosis and/or pulmonary embolus.HIGH RISK: Target INR is 2.5-3.5 
for patients with mechanical heart valves.U/S, ABDOMINAL, [id number] 04:
08:00Reason for exam:-&gt;assess biliary systemFINAL REPORT PATIENT ID:   
[id number] INDICATION: assess biliary system COMPARISON: None. TECHNIQUE:  Real-
time transabdominal gray scale and color Doppler ultrasound of the abdomen.  
FINDINGS:Liver: Size: 14.2cm.      Echogenicity: Somewhat heterogeneous hepatic 
echotexture.     Masses/lesions: There is a 2.6 x 2.8 x 2.5 cm hypoechoic 
nodule in the posterior aspect of the right liver targetoid appearance.     
Surface Nodularity: None.     Intrahepatic bile ducts: Mild intrahepatic 
biliary ductaldilatation.     Common bile duct: 0.6 cm.     MPV: 1.1cm. 
Gallbladder:      Stones: None.     Sludge: None.     Wall thickness: 0.3 cm. 
The gallbladder lumen is nondistended.     Pericholecystic fluid:None.     
Sonographic Ross's sign: No sonographic Ross's sign. Pancreas:      Head 
and uncinate process: Not well seen however there are multiple hypoechoic 
masses in the peripancreatic region the largest of which is posterior to the 
pancreatic tail measuring 11.2 x 8.8 x 12.9 cm.     Body and tail: Not well-
seen. Spleen:     Size: 10.4cm.     Echogenicity: Unremarkable. Right kidney:  
    Size: 10.2 x 4.4 x 5.4 cm.     Parenchyma: Normal echogenicity. No cysts. 
No stones.     Hydronephrosis: None. Left kidney:     Size: 11.6 x 6.0 x 6.1 
cm.     Parenchyma: Normal echogenicity. No cysts. No stones.     Hydronephrosis
: None. Ascites: None. Regional Vasculature: The visible abdominal aorta, IVC 
and hepatic veins are patent. The aorta measures 2.0 cm proximally, 1.8 cm in 
the midportion 1.6 cm distally. Additional findings: Multiple hypoechoic 
nodules in the bevelry hepatis region the largest of which measures 2.5 x 2.2 x 
2.4 cm and represent peripancreatic lymph nodes..  IMPRESSION:  Multiple 
hypoechoic peripancreatic masses the largest of which measures up to 11.1 cm as 
above with multiple abnormal beverly hepatis lymph nodes. Additionally there is 
intrahepatic biliary ductal dilatation and a hypoechoic lesion in the right 
posterior liver concerning for metastatic disease. Recommend further evaluation 
with pancreatic protocol cross-sectional imaging. Signed: Alethea Perez 
[facility] Center[facility] Verified Date/Time:  2019 04:08:06 Reading Location: [facility] B1 
C013T Transitional Reading Room  Electronically signed by: ALETHEA PEREZ MD on 2019 04:08 AM

## 2019-04-14 NOTE — ER
Nurse's Notes                                                                                     

 [facility]                                                                 

Name: Paul Quintero                                                                                

Age: 58 yrs                                                                                       

Sex: Male                                                                                         

: 1960                                                                                   

MRN: [id number]                                                                                   

Arrival Date: 2019                                                                          

Time: 18:14                                                                                       

Account#: [id number]                                                                            

Bed 6                                                                                             

Private MD:                                                                                       

Diagnosis: Neutropenia;Fever, unspecified                                                         

                                                                                                  

Presentation:                                                                                     

                                                                                             

18:21 Presenting complaint: Patient states: productive cough that began 2-3 days ago. Pt      aa5 

      denies SOB. Pt reports 2nd Chemo was 1 week ago. Transition of care: patient was not        

      received from another setting of care. Onset of symptoms was 2019. Care prior to      

      arrival: None.                                                                              

18:21 Method Of Arrival: Ambulatory                                                           aa5 

18:21 Acuity: CHELO 2                                                                           aa5 

20:15 Risk Assessment: Do you want to hurt yourself or someone else? Patient reports no       tl2 

      desire to harm self or others. Initial Sepsis Screen: Does the patient meet any 2           

      criteria? HR > 90 bpm. Does the patient have a suspected source of infection? No.           

      Patient's initial sepsis screen is negative.                                                

                                                                                                  

Triage Assessment:                                                                                

18:30 General: Appears distressed, comfortable, slender, Behavior is cooperative, appropriate bp  

      for age, anxious. Pain: Denies pain. EENT: No deficits noted. Neuro: Level of               

      Consciousness is awake, alert, obeys commands, Oriented to person, place, time,             

      situation, Appropriate for age. Cardiovascular: No deficits noted. Respiratory: Reports     

      shortness of breath cough that is productive, Onset: The symptoms/episode                   

      began/occurred at an unknown time. the patient has moderate shortness of breath. GI: No     

      signs and/or symptoms were reported involving the gastrointestinal system. : No signs     

      and/or symptoms were reported regarding the genitourinary system. Derm: No deficits         

      noted. Musculoskeletal: Circulation, motion, and sensation intact. Range of motion:         

      intact in all extremities.                                                                  

                                                                                                  

Historical:                                                                                       

- Allergies:                                                                                      

18:22 No Known Allergies;                                                                     aa5 

- PMHx:                                                                                           

18:22 Lymphoma; Chemotherapy;                                                                 aa5 

18:24 Hypertension;                                                                           aa5 

- PSHx:                                                                                           

18:24 port-a-cath; sx for lymphoma to liver;                                                  aa5 

                                                                                                  

- Immunization history:: Adult Immunizations up to date.                                          

- Social history:: Smoking status: Patient/guardian denies using tobacco.                         

- Ebola Screening: : No symptoms or risks identified at this time.                                

                                                                                                  

                                                                                                  

Screenin:12 Abuse screen: Denies threats or abuse. Denies injuries from another. Nutritional        bp  

      screening: No deficits noted. Tuberculosis screening: No symptoms or risk factors           

      identified. Fall Risk None identified.                                                      

                                                                                                  

Assessment:                                                                                       

18:30 General: Appears in no apparent distress. uncomfortable, ill, slender, Behavior is      bp  

      cooperative, appropriate for age, anxious. Pain: Complains of pain in chest. Neuro:         

      Level of Consciousness is awake, alert, obeys commands, Oriented to person, place,          

      time, situation, Appropriate for age. Cardiovascular: Rhythm is sinus tachycardia.          

      Respiratory: Airway is patent Respiratory effort is even, labored, Respiratory pattern      

      is regular, Breath sounds are coarse bilaterally. GI: Reports nausea. : No signs          

      and/or symptoms were reported regarding the genitourinary system. EENT: No deficits         

      noted. Derm: No deficits noted. Musculoskeletal: Circulation, motion, and sensation         

      intact. Range of motion: intact in all extremities.                                         

20:14 Reassessment: O2 decreased to 89% on RA, placed on 3 L per nc, O2 increased to 94%. Pt  tl2 

      states that tussionex improved his cough, pt is resting comfortably, 2nd liter of fluid     

      infusing.                                                                                   

21:00 Reassessment: Patient appears in no apparent distress at this time. Patient and/or      tl2 

      family updated on plan of care and expected duration. Pain level reassessed. Patient is     

      alert, oriented x 3, equal unlabored respirations, skin warm/dry/pink.                      

22:00 Reassessment: Patient appears in no apparent distress at this time. Patient and/or      tl2 

      family updated on plan of care and expected duration. Pain level reassessed. Patient is     

      alert, oriented x 3, equal unlabored respirations, skin warm/dry/pink.                      

23:00 Reassessment: Patient appears in no apparent distress at this time. Patient and/or      tl2 

      family updated on plan of care and expected duration. Pain level reassessed. Patient is     

      alert, oriented x 3, equal unlabored respirations, skin warm/dry/pink.                      

                                                                                                  

Vital Signs:                                                                                      

18:24 BP 94 / 75; Pulse 121; Resp 20 S; Temp 100.2(O); Pulse Ox 93% on R/A; Weight 65.32 kg   aa5 

      (R); Pain 0/10;                                                                             

19:25 BP 94 / 70; Pulse 121; Resp 25; Pulse Ox 95% on R/A;                                    tl2 

20:10 Pulse Ox 94% on R/A;                                                                    tl2 

20:10 BP 94 / 51; Pulse 115; Resp 23; Temp 100.8(O); Pulse Ox 90% on R/A;                     tl2 

21:00  / 78; Pulse 106; Resp 16; Pulse Ox 97% on 2 lpm NC;                              tl2 

22:08 BP 91 / 57; Pulse 107; Resp 18; Pulse Ox 97% on 2 lpm NC;                               tl2 

22:23 BP 90 / 57; Pulse 102; Resp 18; Temp 99.3(O); Pulse Ox 97% on 4 lpm NC;                 tl2 

23:05 BP 90 / 59; Pulse 96; Resp 18; Pulse Ox 97% on 3 lpm NC;                                tl2 

                                                                                                  

ED Course:                                                                                        

18:14 Patient arrived in ED.                                                                  as  

18:21 Arm band placed on.                                                                     aa5 

18:22 Triage completed.                                                                       aa5 

18:29 Dustin Peacock PA is PHCP.                                                               jr8 

18:29 Obinna Perez MD is Attending Physician.                                             jr8 

18:29 Arley Perla, BYRON is Primary Nurse.                                                    bp  

18:50 First set of blood cultures drawn by me, EKG done, by ED staff, reviewed by Dustin Peacock jb1 

      PA.                                                                                         

18:59 Chest Single View XRAY In Process Unspecified.                                          EDMS

19:05 Second set of blood cultures drawn by me.                                               jb1 

19:12 Patient has correct armband on for positive identification. Placed in gown. Bed in low  bp  

      position. Call light in reach. Side rails up X2. Adult w/ patient.                          

19:13 Inserted saline lock: 22 gauge in right antecubital area, using aseptic technique.      bp  

      Blood collected.                                                                            

20:47 CT Chest W/ Con In Process Unspecified.                                                 EDMS

22:14 Carrie Castro MD is Hospitalizing Provider.                                           jr8 

23:00 No provider procedures requiring assistance completed. Patient admitted, IV remains in  tl2 

      place.                                                                                      

                                                                                                  

Administered Medications:                                                                         

19:19 Drug: NS 0.9% (30 ml/kg) 30 ml/kg {Note: first liter started at 1919.} Route: IV; Rate: tl2 

      bolus; Site: right antecubital;                                                             

20:21 Follow up: 2nd liter started at                                                     tl2 

22:00 Follow up: IV Status: Completed infusion; IV Intake: 2000ml                             tl2 

19:19 Drug: Zosyn 3.375 grams Route: IVPB; Infused Over: 60 mins; Site: right antecubital;    tl2 

20:00 Follow up: IV Status: Completed infusion; IV Intake: 100ml                              tl2 

19:19 Drug: Zofran 4 mg Route: IVP; Site: right antecubital;                                  tl2 

20:00 Follow up: Response: No adverse reaction; Nausea is decreased                           tl2 

19:37 Drug: Tussionex Pennkinetic ER 5 ml Route: PO;                                          tl2 

20:20 Follow up: Response: No adverse reaction; Marked relief of symptoms                     tl2 

20:25 Drug: Tylenol 650 mg Route: PO;                                                         tl2 

22:00 Follow up: Response: No adverse reaction; Temperature is decreased                      tl2 

                                                                                                  

                                                                                                  

Intake:                                                                                           

20:00 IV: 100ml; Total: 100ml.                                                                tl2 

22:00 IV: 2000ml; Total: 2100ml.                                                              tl2 

                                                                                                  

Outcome:                                                                                          

22:15 Decision to Hospitalize by Provider.                                                    jr8 

23:00 Admitted to ER Hold.  Please see [facility] for further documentation.                    tl2 

23:00 Condition: stable                                                                           

23:00 Discharge instructions given to patient, family, Instructed on the need for admit.          

04/15                                                                                             

01:58 Patient left the ED.                                                                    tl2 

                                                                                                  

Signatures:                                                                                       

Dispatcher [employer]                           EDMS                                                 

Murtaza Alexander                                 jb1                                                  

Teresa Walker Audri, RN                     RN   aa5                                                  

Dustin Peacock PA                        PA   jr8                                                  

Sondra Chavez RN                        RN   tl2                                                  

Arley Perla RN                      RN   bp                                                   

                                                                                                  

Corrections: (The following items were deleted from the chart)                                    

                                                                                             

20:25 20:10 BP 94 / 51; Pulse 115bpm; Resp 23bpm; Pulse Ox 90% RA; tl2                        tl2 

22:24 22:23 BP 90 / 57; Pulse 102bpm; Resp 18bpm; Pulse Ox 97% 4 lpm Nasal Cannula; tl2       tl2 

                                                                                                  

**************************************************************************************************

## 2019-04-14 NOTE — EDPHYS
Physician Documentation                                                                           

 [facility]                                                                 

Name: Paul Quintero                                                                                

Age: 58 yrs                                                                                       

Sex: Male                                                                                         

: 1960                                                                                   

MRN: [id number]                                                                                   

Arrival Date: 2019                                                                          

Time: 18:14                                                                                       

Account#: [id number]                                                                            

Bed 6                                                                                             

Private MD:                                                                                       

[facility] Physician Obinna Perez                                                                      

HPI:                                                                                              

                                                                                             

18:15 This 58 yrs old  Male presents to ER via Ambulatory with complaints of          jr8 

      Shortness Of Breath, Cough.                                                                 

18:15 The patient has shortness of breath at rest. Onset: The symptoms/episode began/occurred jr8 

      gradually, 1 week(s) ago. Duration: The symptoms are continuous. Duration: The symptoms     

      are continuous, and are steadily getting worse. The patient's shortness of breath is        

      aggravated by exertion. Associated signs and symptoms: Pertinent positives: productive      

      cough, fever, Pertinent negatives: chest pain, dizziness, hemoptysis, nausea, vomiting.     

      The patient has experienced a previous episode. The patient has been recently seen by a     

      physician: the patient's primary care provider. Patient is currently receiving              

      chemotherapy for lymphoma. During his first round of chemotherapy he reports that he        

      developed a pleural effusion which required him to be admitted into the hospital. He        

      just finished his second round of chemotherapy and he reports similar symptoms as           

      before; fatigue, SOB, and cough. .                                                          

                                                                                                  

Historical:                                                                                       

- Allergies:                                                                                      

18:22 No Known Allergies;                                                                     aa5 

- PMHx:                                                                                           

18:22 Lymphoma; Chemotherapy;                                                                 aa5 

18:24 Hypertension;                                                                           aa5 

- PSHx:                                                                                           

18:24 port-a-cath; sx for lymphoma to liver;                                                  aa5 

                                                                                                  

- Immunization history:: Adult Immunizations up to date.                                          

- Social history:: Smoking status: Patient/guardian denies using tobacco.                         

- Ebola Screening: : No symptoms or risks identified at this time.                                

                                                                                                  

                                                                                                  

ROS:                                                                                              

18:15 Cardiovascular: Negative for chest pain, palpitations, and edema, Abdomen/GI: Negative  jr8 

      for abdominal pain, nausea, vomiting, diarrhea, and constipation, MS/Extremity:             

      Negative for injury and deformity, Skin: Negative for injury, rash, and discoloration,      

      Neuro: Negative for headache, weakness, numbness, tingling, and seizure.                    

18:15 Constitutional: Positive for fatigue, malaise, Negative for body aches, chills, fever.      

18:15 Respiratory: Positive for cough, with no reported sputum, orthopnea, shortness of           

      breath.                                                                                     

                                                                                                  

Exam:                                                                                             

18:59 Constitutional:  This is a well developed, well nourished patient who is awake, alert,  jr8 

      and in no acute distress. ENT:  Nares patent. No nasal discharge, no septal                 

      abnormalities noted.  Tympanic membranes are normal and external auditory canals are        

      clear.  Oropharynx with no redness, swelling, or masses, exudates, or evidence of           

      obstruction, uvula midline.  Mucous membranes moist.                                        

18:59 Cardiovascular:  Regular rate and rhythm with a normal S1 and S2.  No gallops, murmurs,     

      or rubs.  Normal PMI, no JVD.  No pulse deficits. Abdomen/GI:  Soft, non-tender, with       

      normal bowel sounds.  No distension or tympany.  No guarding or rebound.  No evidence       

      of tenderness throughout.                                                                   

18:59 Skin:  Warm, dry with normal turgor.  Normal color with no rashes, no lesions, and no       

      evidence of cellulitis. MS/ Extremity:  Pulses equal, no cyanosis.  Neurovascular           

      intact.  Full, normal range of motion. Neuro:  Awake and alert, GCS 15, oriented to         

      person, place, time, and situation.  Cranial nerves II-XII grossly intact.  Motor           

      strength 5/5 in all extremities.  Sensory grossly intact.  Cerebellar exam normal.          

      Normal gait.                                                                                

18:59 Eyes: Pupils: equal, round, and reactive to light and accomodation, Extraocular             

      movements: intact throughout, Conjunctiva: pale, bilaterally, Sclera: no appreciated        

      abnormality.                                                                                

18:59 Respiratory: the patient does not display signs of respiratory distress,  Respirations:     

      normal, symetrical, no use of accessory muscles, no grunting, no evidence of nasal          

      flaring, no pursed lip breathing, no retractions, Breath sounds: crackles to bilateral      

      lung bases.                                                                                 

19:02 ECG was reviewed by the Attending Physician.                                            [facility] 

                                                                                                  

Vital Signs:                                                                                      

18:24 BP 94 / 75; Pulse 121; Resp 20 S; Temp 100.2(O); Pulse Ox 93% on R/A; Weight 65.32 kg   aa5 

      (R); Pain 0/10;                                                                             

19:25 BP 94 / 70; Pulse 121; Resp 25; Pulse Ox 95% on R/A;                                    tl2 

20:10 Pulse Ox 94% on R/A;                                                                    tl2 

20:10 BP 94 / 51; Pulse 115; Resp 23; Temp 100.8(O); Pulse Ox 90% on R/A;                     tl2 

21:00  / 78; Pulse 106; Resp 16; Pulse Ox 97% on 2 lpm NC;                              tl2 

22:08 BP 91 / 57; Pulse 107; Resp 18; Pulse Ox 97% on 2 lpm NC;                               tl2 

22:23 BP 90 / 57; Pulse 102; Resp 18; Temp 99.3(O); Pulse Ox 97% on 4 lpm NC;                 tl2 

23:05 BP 90 / 59; Pulse 96; Resp 18; Pulse Ox 97% on 3 lpm NC;                                tl2 

                                                                                                  

MDM:                                                                                              

18:29 Patient medically screened.                                                             jr8 

19:01 Differential diagnosis: Sepsis. Data reviewed: vital signs, nurses notes, EKG.          jr8 

      Awaiting: labs results, X-ray results.                                                      

22:14 Data reviewed: lab test result(s), radiologic studies, CT scan, plain films. Data       jr8 

      interpreted: Pulse oximetry: on room air is 97 %. Interpretation: normal. Counseling: I     

      had a detailed discussion with the patient and/or guardian regarding: the historical        

      points, exam findings, and any diagnostic results supporting the discharge/admit            

      diagnosis, lab results, radiology results, the need for further work-up and treatment       

      in the hospital. Physician consultation: Carrie Castro MD was called at 22:14, was          

      contacted at 22:14, regarding admission, to the telemetry unit. consult, patient's          

      condition, and will see patient.                                                            

                                                                                                  

                                                                                             

18:38 Order name: Basic Metabolic Panel; Complete Time: 19:41                                                                                                                              

18:38 Order name: Blood Culture Adult (2)                                                                                                                                                  

18:38 Order name: CBC with Diff; Complete Time: 19:52                                                                                                                                      

18:38 Order name: Lactate; Complete Time: 19:36                                                                                                                                            

18:38 Order name: LFT's; Complete Time: 19:41                                                                                                                                              

18:38 Order name: Lipase; Complete Time: 19:41                                                                                                                                             

18:38 Order name: Procalcitonin; Complete Time: 19:48                                                                                                                                      

18:38 Order name: Protime (+inr); Complete Time: 19:33                                                                                                                                     

18:38 Order name: Ptt, Activated; Complete Time: 19:33                                                                                                                                     

18:38 Order name: Troponin (emerg Dept Use Only); Complete Time: 19:41                        8 

                                                                                             

18:38 Order name: Urine Microscopic Only                                                      8 

                                                                                             

19:48 Order name: Manual Differential; Complete Time: 19:52                                   EDMS

                                                                                             

23:31 Order name: CBC with Automated Diff                                                     EDMS

                                                                                             

23:31 Order name: CBC with Automated Diff                                                     EDMS

                                                                                             

18:38 Order name: Chest Single View XRAY; Complete Time: 19:14                                8 

                                                                                             

20:17 Order name: CT Chest W/ Con; Complete Time: 21:42                                       8 

                                                                                             

23:31 Order name: Comprehensive Metabolic Panel                                               EDMS

                                                                                             

23:31 Order name: Comprehensive Metabolic Panel                                               EDMS

                                                                                             

23:31 Order name: Lipid Profile                                                               EDMS

                                                                                             

23:31 Order name: Lipid Profile                                                               EDMS

                                                                                             

23:31 Order name: Magnesium                                                                   EDMS

                                                                                             

23:31 Order name: Magnesium                                                                   EDMS

                                                                                             

23:31 Order name: Phosphorus                                                                  EDMS

                                                                                             

23:32 Order name: Phosphorus                                                                  EDMS

                                                                                             

23:32 Order name: Protime (+INR)                                                              EDMS

                                                                                             

23:32 Order name: Protime (+INR)                                                              EDMS

                                                                                             

23:32 Order name: PTT, Activated Partial Thromb                                               EDMS

                                                                                             

23:32 Order name: PTT, Activated Partial Thromb                                               EDMS

                                                                                             

23:36 Order name: Chest Lateral Decubitus                                                     EDMS

04/15                                                                                             

01:24 Order name: Urine Dipstick--Ancillary (enter results)                                   ar5 

                                                                                             

18:38 Order name: Accucheck; Complete Time: 19:10                                                                                                                                          

18:38 Order name: Cardiac monitoring; Complete Time: 19:10                                                                                                                                 

18:38 Order name: EKG - Nurse/Tech; Complete Time: 19:10                                                                                                                                   

18:38 Order name: IV Saline Lock - Large Bore; Complete Time: 19:10                                                                                                                        

18:38 Order name: Labs collected and sent; Complete Time: 19:10                                                                                                                            

18:38 Order name: O2 Per Protocol; Complete Time: 19:10                                                                                                                                    

18:38 Order name: O2 Sat Monitoring; Complete Time: 19:10                                                                                                                                  

18:38 Order name: Urine Dipstick-Ancillary (obtain specimen); Complete Time: 00:31            jr8 

                                                                                             

23:31 Order name: CONS Physician Consult                                                      EDMS

                                                                                             

23:31 Order name: NPO                                                                         EDMS

                                                                                                  

EC:02 Rate is 116 beats/min. Rhythm is regular. WI interval is normal. QRS interval is        jr8 

      normal. T waves are Normal. No ST changes noted. Interpreted by me. Reviewed by me.         

                                                                                                  

Administered Medications:                                                                         

19:19 Drug: NS 0.9% (30 ml/kg) 30 ml/kg {Note: first liter started at 1919.} Route: IV; Rate: tl2 

      bolus; Site: right antecubital;                                                             

20:21 Follow up: 2nd liter started at                                                     tl2 

22:00 Follow up: IV Status: Completed infusion; IV Intake: 2000ml                             tl2 

19:19 Drug: Zosyn 3.375 grams Route: IVPB; Infused Over: 60 mins; Site: right antecubital;    tl2 

20:00 Follow up: IV Status: Completed infusion; IV Intake: 100ml                              tl2 

19:19 Drug: Zofran 4 mg Route: IVP; Site: right antecubital;                                  tl2 

20:00 Follow up: Response: No adverse reaction; Nausea is decreased                           tl2 

19:37 Drug: Tussionex Pennkinetic ER 5 ml Route: PO;                                          tl2 

20:20 Follow up: Response: No adverse reaction; Marked relief of symptoms                     tl2 

20:25 Drug: Tylenol 650 mg Route: PO;                                                         tl2 

22:00 Follow up: Response: No adverse reaction; Temperature is decreased                      tl2 

                                                                                                  

                                                                                                  

Disposition:                                                                                      

04/15                                                                                             

09:08 Co-signature as Attending Physician, Obinna Perez MD I agree with the assessment and  [facility] 

      plan of care.                                                                               

                                                                                                  

Disposition:                                                                                      

19 22:15 Hospitalization ordered by Carrie Castro for Inpatient Admission. Preliminary     

  diagnosis are Neutropenia, Fever, unspecified.                                                  

- Bed requested for Telemetry/MedSurg (Inpatient).                                                

- Status is Inpatient Admission.                                                              tl2 

- Condition is Stable.                                                                            

- Problem is new.                                                                                 

- Symptoms have improved.                                                                         

UTI on Admission? No                                                                              

                                                                                                  

                                                                                                  

                                                                                                  

Signatures:                                                                                       

Dispatcher [employer]                           EDMS                                                 

Karely Urbano RN RN dw Anderson, Corey, MD MD cha Calderon, Audri RN                     RN   aa5                                                  

Dustin Peacock PA                        PA   jr8                                                  

Sondra Chavez RN RN   tl2                                                  

Arley Perla, RN                      RN   bp                                                   

                                                                                                  

Corrections: (The following items were deleted from the chart)                                    

                                                                                             

22:41 22:15 Hospitalization Ordered by Carrie Castro MD for Inpatient Admission. Preliminary  dw  

      diagnosis is Neutropenia; Fever, unspecified. Bed requested for Telemetry/MedSurg           

      (Inpatient). Status is Inpatient Admission. Condition is Stable. Problem is new.            

      Symptoms have improved. UTI on Admission? No. jr8                                           

04/15                                                                                             

01:19  22:41 2019 22:15 Hospitalization Ordered by Carrie Castro MD for Inpatient  dw  

      Admission. Preliminary diagnosis is Neutropenia; Fever, unspecified. Bed requested for      

      [facility] ER HOLD. Status is Inpatient Admission. Condition is Stable. Problem is new.           

      Symptoms have improved. UTI on Admission? No. dw                                            

04/15                                                                                             

01:58 01:19 2019 22:15 Hospitalization Ordered by Carrie Castro MD for Inpatient        tl2 

      Admission. Preliminary diagnosis is Neutropenia; Fever, unspecified. Bed requested for      

      Telemetry/MedSurg (Inpatient). Status is Inpatient Admission. Condition is Stable.          

      Problem is new. Symptoms have improved. UTI on Admission? No. dw                            

                                                                                                  

**************************************************************************************************

## 2019-04-14 NOTE — RAD REPORT
EXAM DESCRIPTION:  RAD - Chest Single View - 4/14/2019 7:00 pm

 

CLINICAL HISTORY:  Dyspnea, productive cough, shortness of breath

 

COMPARISON:  February 2019

 

TECHNIQUE:  AP portable chest image was obtained 1857 hours .

 

FINDINGS:  Pleural effusion and right base atelectasis are present. The volume is likely mild. The pa
tient has a known elevated right hemidiaphragm which could accentuate artifactually the amount of ple
ural fluid. No left-sided pleural effusion. Right-sided Port-A-Cath has been placed. Heart and vascul
ature are normal. No pneumothorax. No acute bony abnormality seen. No acute aortic findings suspected
.

 

IMPRESSION:  Small right pleural effusion with right base infiltrate and/ or atelectasis accentuated 
by the pre-existing elevated right hemidiaphragm.

## 2019-04-14 NOTE — P.HP
Certification for Inpatient


Patient admitted to: Inpatient


With expected LOS: >2 Midnights


Patient will require the following post-hospital care: None


Practitioner: I am a practitioner with admitting privileges, knowledge of 

patient current condition, hospital course, and medical plan of care.


Services: Services provided to patient in accordance with Admission 

requirements found in Title 42 Section 412.3 of the Code of Federal Regulations





Patient History


Date of Service: 04/15/19


Reason for admission:  SHORTNESS OF BREATH; HYPERGLYCEMIA


History of Present Illness: 





  PATIENT IS A 58-YEAR-OLD GENTLEMAN CAME TO THE HOSPITAL WITH   SHORTNESS OF 

BREATH.  PATIENT WAS FOUND TO HAVE A SLIGHT FEVER AS WELL AS LEUKOPENIA.  

PATIENT'S SHORTNESS OF BREATH WAS CAUSED BY A PLEURAL EFFUSION ON THE RIGHT 

SIDE.  THIS COULD BE LOCULATED.  IT IS NOT FREELY MOBILE.  WILL GET RADIOLOGY 

INPUT AT THIS TIME.  THEN WE WILL ALSO GET INTERVENTIONAL RADIOLOGY FOR 

THORACENTESIS & PULMONARY CONSULTATION AS WELL.


Allergies





No Known Allergies Allergy (Unverified 04/14/19 23:33)


 





Home Medications: 








Acyclovir [Zovirax] 400 mg PO BID 04/14/19 


Allopurinol [Zyloprim*] 300 mg PO DAILY 04/14/19 


Amlodipine [Norvasc*] 5 mg PO DAILY 04/14/19 


Ciprofloxacin/Ciprofloxa HCl [Ciprofloxacin 500 mg ER Tablet] 500 mg PO DAILY 04 /14/19 


Entecavir [Baraclude] 0.5 mg PO DAILY 04/14/19 


Folic Acid 1 mg PO DAILY 04/14/19 


Ondansetron HCl [Zofran] 4 mg PO PRN 04/14/19 


Pantoprazole [Protonix Tab*] 40 mg PO DAILY 04/14/19 








- Past Medical/Surgical History


-:   PLEURAL EFFUSION


-:  LYMPHOMA


-:  HYPERTENSION


-:  GOUTY ARTHROPATHY


Past Surgical History: Reviewed- Non-Contributory





- Family History


  ** Father


Family History: Reviewed- Non-Contributory





- Social History


Smoking therapy provided: Yes


Alcohol use: No


CD- Drugs: No





Physical Examination





- Studies


Laboratory Data (last 24 hrs)





04/14/19 19:05: PT 15.0 H, INR 1.28, APTT 28.3


04/14/19 19:05: WBC 1.6 L*, Hgb 10.9 L, Hct 32.3 L, Plt Count 194


04/14/19 19:05: Sodium 136, Potassium 3.6, BUN 17, Creatinine 0.86, Glucose 104

, Total Bilirubin 0.6, AST 24, ALT 50, Alkaline Phosphatase 108, Lipase 117








Assessment & Plan





- Advance Directives


Does patient have a Living Will: No


Does patient have a Durable POA for Healthcare: No

## 2019-04-14 NOTE — XMS REPORT
Clinical Summary

 Created on:2019



Patient:Car Sanchez

Sex:Male

:1960

External Reference #:[id number]





Demographics







 Address  [address]



   [place] [address]

 

 Home Phone  [phone]

 

 Mobile Phone  [phone]

 

 Email Address  [email or web address]

 

 Preferred Language  English

 

 Marital Status  Unknown

 

 Yarsani Affiliation  Unknown

 

 Race  White

 

 Ethnic Group   or 









Author







 Organization  [facility]

 

 Address  [address] [place] [address]









Support







 Name  Relationship  Address  Phone

 

 SERGIO SANCHEZ  Unavailable  [address]  [phone]



     [place] [address]  









Care Team Providers







 Name  Role  Phone

 

 Unavailable  Primary Care Provider  Unavailable









Allergies

No Known Allergies



Medications







 Medication  Sig  Dispensed  Refills  Start  End Date  Status



         Date    

 

 amLODIPine  Take 1 tablet (5  90 tablet  0  20  Active



 (NORVASC) 5 MG  mg total) by mouth      9  20  



 tablet  daily.          

 

 entecavir  Take 1 tablet (0.5  90 tablet  0  03/15/201  03/14/20  Active



 (BARACLUDE) 0.5 MG  mg total) by mouth      9  20  



 tablet  daily.          

 

 folic acid  Take 1 tablet (1  90 tablet  0  03/15/201  03/14/20  Active



 (FOLVITE) 1 MG  mg total) by mouth      9  20  



 tablet  daily.          

 

 pantoprazole  Take 1 tablet (40  90 tablet  0      Active



 (PROTONIX) 40 MG  mg total) by mouth      9    



 tablet  daily.          

 

 allopurinol  Take 1 tablet (300  30 tablet  0  03/15/201  03/14/20  Active



 (ZYLOPRIM) 300 MG  mg total) by mouth      9  20  



 tablet  daily.          

 

 thiamine (VITAMIN  Take 1 tablet (50  30 tablet  3  04/10/201  04/09/20  Active



 B-1) 50 MG tablet  mg total) by mouth      9  20  



   daily.          

 

 ciprofloxacin HCl  Take 1 tablet (500  28 tablet  0  20  
Active



 (CIPRO) 500 MG  mg total) by mouth      9  19  



 tablet  2 (two) times          



   daily for 14 days.          

 

 acyclovir  Take 1 tablet (400  28 tablet  0  20  Active



 (ZOVIRAX) 400 MG  mg total) by mouth      9  19  



 tablet  2 (two) times          



   daily for 14 days.          

 

 fluconazole  Take 2 tablets  28 tablet  0  20  Active



 (DIFLUCAN) 200 MG  (400 mg total) by      9  19  



 tablet  mouth daily for 14          



   days.          

 

 ondansetron  Take 2 tablets (8  30 tablet  1  20  Active



 (ZOFRAN) 4 MG  mg total) by mouth      9  19  



 tablet  2 (two) times          



   daily as needed          



   for Nausea for up          



   to 15 days.          

 

 omeprazole  Take 40 mg by    0    20  Discontinued



 (PRILOSEC) 40 MG  mouth daily.        19  



 capsule            

 

 docusate (COLACE)  Take 10 mLs (100  100 mL  0  20  



 50 mg/5 mL liquid  mg total) by mouth      9  19  



   2 (two) times          



   daily for 10 days.          

 

 mirtazapine  Take 1 tablet (7.5  30 tablet  0  20  
Discontinued



 (REMERON) 7.5 MG  mg total) by mouth      9  19  



 tablet  nightly for 30          



   days.          

 

 potassium, sodium  Take 1 packet by  12 packet  0  20  



 phosphates  mouth 4 (four)      9  19  



 (PHOS-NAK)  times daily with          



 280-160-250 mg  meals and nightly          



 PwPk packet  for 3 days.          

 

 ciprofloxacin HCl  Take 1 tablet (500  28 tablet  0  20  




 (CIPRO) 500 MG  mg total) by mouth      9  19  



 tablet  2 (two) times          



   daily for 14 days.          

 

 acyclovir  Take 2 capsules  56 capsule  0  20  



 (ZOVIRAX) 200 MG  (400 mg total) by      9  19  



 capsule  mouth 2 (two)          



   times daily for 14          



   days.          

 

 HYDROcodone-acetam  Take 1 tablet by  30 tablet  0  20  




 inophen (NORCO  mouth every 6      9  19  



 5-325) 5-325 mg  (six) hours as          



 per tablet  needed for Pain          



   for up to 10 days.          



   Max Daily Amount:          



   4 tablets          

 

 bisacodyl  Take 2 tablets (10  60 tablet  0  20  Discontinued



 (DULCOLAX) 5 mg EC  mg total) by mouth      9  19  



 tablet  daily as needed          



   for Constipation          



   for up to 30 days.          

 

 polyethylene  Take 17 g by mouth  255 g  0  20  Discontinued



 glycol (GLYCOLAX)  daily for 30 days.      9  19  



 17 gram/dose            



 powder            

 

 potassium chloride  Take 1 tablet (20  14 tablet  0  20  




 SA  mEq total) by      9  19  



 (K-DUR,KLOR-CON)  mouth 2 (two)          



 20 MEQ tablet  times daily for 7          



   days.          

 

 ondansetron  Take 1 tablet (4  30 tablet  0  20  



 (ZOFRAN) 4 MG  mg total) by mouth      9  19  



 tablet  every 4 (four)          



   hours as needed          



   for Nausea for up          



   to 7 days.          

 

 acyclovir  Take 400 mg by    0    20  Discontinued



 (ZOVIRAX) 400 MG  mouth 2 (two)        19  



 tablet  times daily.          

 

 esomeprazole  Take 20 mg by    0    20  Discontinued



 (NEXIUM) 20 MG  mouth daily.        19  



 capsule            

 

 pegfilgrastim  Inject 0.6 mLs (6  0.6 mL  5  20  



 (NEULASTA) 6  mg total)      9  19  



 mg/0.6mL injection  subcutaneously          



   once for 1 dose.          







Active Problems







 Problem  Noted Date

 

 Hypokalemia  2019

 

 Pleural effusion on right  2019

 

 Diffuse large B-cell lymphoma of intra-abdominal lymph nodes  2019

 

 High grade B-cell lymphoma  2019

 

 Moderate protein-calorie malnutrition  2019

 

 Obstructive jaundice  2019







Encounters







 Date  Type  Specialty  Care Team  Description

 

 2019  Hospital  Oncology  Amy Laguerre,  Diffuse large B-cell 
lymphoma of intra-abdominal lymph nodes (HCC);



 -  Encounter    MD



  High grade B-cell lymphoma (HCC);



 2019      Laura Bishop  Moderate protein-calorie malnutrition (HCC);



       MD JOSE



  Obstructive jaundice;



       Jorge A Lerma  Pleural effusion;



       Alex DUNNE MD  Weight loss;



         Loculated pleural effusion;



         Admission for antineoplastic chemotherapy;



         Chemotherapy management, encounter for;



         Hypokalemia;



         Hyponatremia;



         Slow transit constipation

 

 2019  Travel      

 

 2019  Orders Only  Internal Medicine  Amy Laguerre MD  

 

 2019  Anesthesia Event  Gastroenterology  Greg Rice MD  

 

 2019  [facility]  Gastroenterology  Chu Noland  



   Encounter    Ludin Jean MD  

 

 2019  [facility]  [facility]  



   Encounter    Preadmit Phone  

 

 2019  [facility]  Radiology  GonzalezEnderRené  Diffuse large b-cell



   Encounter    MD Scott  lymphoma,



         intra-abdominal



         lymph nodes (HCC)

 

 2019  Outside Orders  Central Scheduling  René Gonzalezcell



       MD Scott  lymphoma,



         intra-abdominal



         lymph nodes (HCC)



         (Primary Dx)

 

 2019  Outside Orders  Central Scheduling  René Gonzalez MD  

 

 2019  [facility]  Radiology  Gonzalez, René  Diffuse large B-cell



   Encounter    MD Scott  lymphoma of



         intra-abdominal



         lymph nodes (HCC)

 

 2019  Outside Orders  Central Scheduling  René Gonzalez 
[facility]acell



       MD Scott  lymphoma of



         intra-abdominal



         lymph nodes (HCC)



         (Primary Dx)

 

 2019  Anesthesia Event    Bay Ramos MD  

 

 2019  Surgery    Virtual, Surgeon  ANGIOGRAM,CEREBRAL

 

 2019  Orders Only  General Internal    



     Medicine    

 

 2019  Travel      

 

 2019  Surgery  Gastroenterology  Chuck Melgoza  UPPER ENDOSCOPY,FNA



       MD Ananth  W/ULTRASOUND

 

 2019  Anesthesia Event  Gastroenterology  Juice Marie MD  

 

 2019  [facility]  Oncology  Brann,  Obstructive jaundice;



 -  Encounter    Luis  Mediastinal lymphadenopathy;



 2019      MD Garry



  Intra-abdominal lymphadenopathy;



       Lee Ann Hanson,  Diffuse large B-cell lymphoma, unspecified body region (
HCC);



       MD



  Chemotherapy follow-up examination



       Tomeka Mullen MD Waheed, Umar, MD  



after 2018



Family History







 Medical History  Relation  Name  Comments

 

 Pancreatic cancer  Brother    









 Relation  Name  Status  Comments

 

 Brother      







Social History







 Tobacco Use  Types  Packs/Day  Years Used  Date

 

 Never Smoker        









 Smokeless Tobacco: Never Used      









 Tobacco Cessation: Counseling Given: No









 Alcohol Use  Drinks/Week  oz/Week  Comments

 

 No      









 Alcohol Habits  Answer  Date Recorded

 

 How often do you have a drink containing alcohol?  Never  2019

 

 How many drinks containing alcohol do you have on a typical  Not asked  



 day when you are drinking?    

 

 How often do you have six or more drinks on one occasion?  Not asked  









 Sex Assigned at Birth  Date Recorded

 

 Not on file  









 Job Start Date  Occupation  Industry

 

 Not on file  Not on file  Not on file









 Travel History  Travel Start  Travel End









 No recent travel history available.







Last Filed Vital Signs







 Vital Sign  Reading  Time Taken

 

 Blood Pressure  129/80  2019  7:00 AM CDT

 

 Pulse  76  2019  7:00 AM CDT

 

 Temperature  36.4 C (97.6 F)  2019  7:00 AM CDT

 

 Respiratory Rate  18  2019  7:00 AM CDT

 

 Oxygen Saturation  94%  2019  7:00 AM CDT

 

 Inhaled Oxygen Concentration  21%  2019  3:24 AM CDT

 

 Weight  66.7 kg (147 lb 1.6 oz)  2019  2:00 PM CDT

 

 Height  162.6 cm (5' 4")  2019  2:00 PM CDT

 

 Body Mass Index  25.25  2019  2:00 PM CDT







Plan of Treatment

Not on file



Implants







 Implanted  Type  Area    Device  Shelf  Model / Serial



         Identifier  Expiration  / Lot



           Date  

 

 Stent Panc Advx St [id number] - Sgtin [id number]  Stents  N/A:  [place]    
2020  3639 /



 Implanted: Qty: 1 on 2019 by Chuck Melgoza MD  -Perip  Pancreas  
SCI:ENDO      GTIN [id number] /



   heral          [id number]







Procedures







 Procedure Name  Priority  Date/Time  Associated  Comments



       Diagnosis  

 

 CBC W/PLT COUNT & AUTO  Routine  2019  4:16    Results for this



 DIFFERENTIAL    AM CDT    procedure are in



         the results



         section.

 

 HEPATIC FUNCTION PANEL  Routine  2019  4:16    Results for this



     AM CDT    procedure are in



         the results



         section.

 

 BASIC METABOLIC PANEL  Routine  2019  4:16    Results for this



 (7)    AM CDT    procedure are in



         the results



         section.

 

 PHOSPHORUS  Routine  2019  4:16    Results for this



     AM CDT    procedure are in



         the results



         section.

 

 MAGNESIUM  Routine  2019  4:16    Results for this



     AM CDT    procedure are in



         the results



         section.

 

 LACTATE DEHYDROGENASE  Routine  2019  4:16    Results for this



 (LDH)    AM CDT    procedure are in



         the results



         section.

 

 URIC ACID  Routine  2019  4:16    Results for this



     AM CDT    procedure are in



         the results



         section.

 

 CBC W/PLT COUNT & AUTO  Routine  2019  4:16    Results for this



 DIFFERENTIAL    AM CDT    procedure are in



         the results



         section.

 

 RHYTHM STRIP - SCAN    2019 10:42    



     AM CDT    

 

 CBC W/PLT COUNT & AUTO  Routine  2019  5:07    Results for this



 DIFFERENTIAL    AM CDT    procedure are in



         the results



         section.

 

 HEPATIC FUNCTION PANEL  Routine  2019  5:07    Results for this



     AM CDT    procedure are in



         the results



         section.

 

 BASIC METABOLIC PANEL  Routine  2019  5:07    Results for this



 (7)    AM CDT    procedure are in



         the results



         section.

 

 PHOSPHORUS  Routine  2019  5:07    Results for this



     AM CDT    procedure are in



         the results



         section.

 

 MAGNESIUM  Routine  2019  5:07    Results for this



     AM CDT    procedure are in



         the results



         section.

 

 LACTATE DEHYDROGENASE  Routine  2019  5:07    Results for this



 (LDH)    AM CDT    procedure are in



         the results



         section.

 

 URIC ACID  Routine  2019  5:07    Results for this



     AM CDT    procedure are in



         the results



         section.

 

 CBC W/PLT COUNT & AUTO  Routine  2019  5:07    Results for this



 DIFFERENTIAL    AM CDT    procedure are in



         the results



         section.

 

 CBC W/PLT COUNT & AUTO  Routine  2019  4:40    Results for this



 DIFFERENTIAL    AM CDT    procedure are in



         the results



         section.

 

 HEPATIC FUNCTION PANEL  Routine  2019  4:40    Results for this



     AM CDT    procedure are in



         the results



         section.

 

 BASIC METABOLIC PANEL  Routine  2019  4:40    Results for this



 (7)    AM CDT    procedure are in



         the results



         section.

 

 PHOSPHORUS  Routine  2019  4:40    Results for this



     AM CDT    procedure are in



         the results



         section.

 

 MAGNESIUM  Routine  2019  4:40    Results for this



     AM CDT    procedure are in



         the results



         section.

 

 LACTATE DEHYDROGENASE  Routine  2019  4:40    Results for this



 (LDH)    AM CDT    procedure are in



         the results



         section.

 

 URIC ACID  Routine  2019  4:40    Results for this



     AM CDT    procedure are in



         the results



         section.

 

 CBC W/PLT COUNT & AUTO  Routine  2019  4:40    Results for this



 DIFFERENTIAL    AM CDT    procedure are in



         the results



         section.

 

 CBC W/PLT COUNT & AUTO  Routine  2019  5:36    Results for this



 DIFFERENTIAL    AM CDT    procedure are in



         the results



         section.

 

 HEPATIC FUNCTION PANEL  Routine  2019  5:36    Results for this



     AM CDT    procedure are in



         the results



         section.

 

 BASIC METABOLIC PANEL  Routine  2019  5:36    Results for this



 (7)    AM CDT    procedure are in



         the results



         section.

 

 PHOSPHORUS  Routine  2019  5:36    Results for this



     AM CDT    procedure are in



         the results



         section.

 

 MAGNESIUM  Routine  2019  5:36    Results for this



     AM CDT    procedure are in



         the results



         section.

 

 LACTATE DEHYDROGENASE  Routine  2019  5:36    Results for this



 (LDH)    AM CDT    procedure are in



         the results



         section.

 

 URIC ACID  Routine  2019  5:36    Results for this



     AM CDT    procedure are in



         the results



         section.

 

 CBC W/PLT COUNT & AUTO  Routine  2019  5:36    Results for this



 DIFFERENTIAL    AM CDT    procedure are in



         the results



         section.

 

 CBC W/PLT COUNT & AUTO  Routine  2019  4:48    Results for this



 DIFFERENTIAL    AM CDT    procedure are in



         the results



         section.

 

 HEPATIC FUNCTION PANEL  Routine  2019  4:48    Results for this



     AM CDT    procedure are in



         the results



         section.

 

 BASIC METABOLIC PANEL  Routine  2019  4:48    Results for this



 (7)    AM CDT    procedure are in



         the results



         section.

 

 PHOSPHORUS  Routine  2019  4:48    Results for this



     AM CDT    procedure are in



         the results



         section.

 

 MAGNESIUM  Routine  2019  4:48    Results for this



     AM CDT    procedure are in



         the results



         section.

 

 LACTATE DEHYDROGENASE  Routine  2019  4:48    Results for this



 (LDH)    AM CDT    procedure are in



         the results



         section.

 

 URIC ACID  Routine  2019  4:48    Results for this



     AM CDT    procedure are in



         the results



         section.

 

 CBC W/PLT COUNT & AUTO  Routine  2019  4:48    Results for this



 DIFFERENTIAL    AM CDT    procedure are in



         the results



         section.

 

 FL GUIDED LUMBAR  ASAP  2019  1:07    Results for this



 PUNCTURE DIAG    PM CDT    procedure are in



         the results



         section.

 

 IR PORT-A-CATH  STAT  2019  9:35    Results for this



 PLACEMENT    AM CDT    procedure are in



         the results



         section.

 

 CBC W/PLT COUNT & AUTO  Routine  2019  6:17    Results for this



 DIFFERENTIAL    AM CDT    procedure are in



         the results



         section.

 

 HEPATIC FUNCTION PANEL  Routine  2019  6:17    Results for this



     AM CDT    procedure are in



         the results



         section.

 

 BASIC METABOLIC PANEL  Routine  2019  6:17    Results for this



 (7)    AM CDT    procedure are in



         the results



         section.

 

 PHOSPHORUS  Routine  2019  6:17    Results for this



     AM CDT    procedure are in



         the results



         section.

 

 MAGNESIUM  Routine  2019  6:17    Results for this



     AM CDT    procedure are in



         the results



         section.

 

 LACTATE DEHYDROGENASE  Routine  2019  6:17    Results for this



 (LDH)    AM CDT    procedure are in



         the results



         section.

 

 URIC ACID  Routine  2019  6:17    Results for this



     AM CDT    procedure are in



         the results



         section.

 

 CBC W/PLT COUNT & AUTO  Routine  2019  6:17    Results for this



 DIFFERENTIAL    AM CDT    procedure are in



         the results



         section.

 

 XR CHEST 2 VIEWS  Routine  2019  9:15    Results for this



     PM CDT    procedure are in



         the results



         section.

 

 CBC W/PLT COUNT & AUTO  Routine  2019 11:46    Results for this



 DIFFERENTIAL    AM CDT    procedure are in



         the results



         section.

 

 PT/APTT  Routine  2019 11:46    Results for this



     AM CDT    procedure are in



         the results



         section.

 

 URIC ACID  Routine  2019 11:46    Results for this



     AM CDT    procedure are in



         the results



         section.

 

 LACTATE DEHYDROGENASE  Routine  2019 11:46    Results for this



 (LDH)    AM CDT    procedure are in



         the results



         section.

 

 CBC W/PLT COUNT & AUTO  Routine  2019 11:46    Results for this



 DIFFERENTIAL    AM CDT    procedure are in



         the results



         section.

 

 COMPREHENSIVE  Routine  2019 11:46    Results for this



 METABOLIC PANEL    AM CDT    procedure are in



         the results



         section.

 

 PROCEDURE W/ C-ARM    2019  8:30  Obstructive  



     AM CDT  jaundice  









   Special Needs







   (C-ARM)









 ERCP    2019  8:30 AM CDT  Obstructive jaundice  









   Special Needs







   (C-ARM)









 XR CHEST 1 VIEW  STAT  2019  4:43    Results for this



 PORTABLE/BEDSIDE    PM CDT    procedure are in



         the results



         section.

 

 US THORACENTESIS  Routine  2019  3:55  Diffuse large  Results for this



     PM CDT  b-cell lymphoma,  procedure are in



       intra-abdominal  the results



       lymph nodes  section.



       (HCC)  

 

 PROTHROMBIN TIME/INR  Routine  2019  1:38  Diffuse large  Results for 
this



     PM CDT  b-cell lymphoma,  procedure are in



       intra-abdominal  the results



       lymph nodes  section.



       (HCC)  

 

 APTT  Routine  2019  1:38  Diffuse large  Results for this



     PM CDT  b-cell lymphoma,  procedure are in



       intra-abdominal  the results



       lymph nodes  section.



       (HCC)  

 

 XR CHEST 2 VIEWS  Routine  2019  1:11  Diffuse large  Results for this



     PM CDT  B-cell lymphoma  procedure are in



       of  the results



       intra-abdominal  section.



       lymph nodes  



       (HCC)  

 

 REPORT OF PROCEDURE -    2019 10:24    



 ENDOSCOPY SCAN    AM CDT    

 

 RHYTHM STRIP - SCAN    2019 10:24    



     AM CDT    

 

 (CELLAVISION MANUAL  STAT  2019 10:13    Results for this



 DIFF)    AM CDT    procedure are in



         the results



         section.

 

 CBC W/PLT COUNT & AUTO  STAT  2019 10:13    Results for this



 DIFFERENTIAL    AM CDT    procedure are in



         the results



         section.

 

 CBC W/PLT COUNT & AUTO  STAT  2019 10:13    Results for this



 DIFFERENTIAL    AM CDT    procedure are in



         the results



         section.

 

 CALCIUM, IONIZED  STAT  2019 10:13    Results for this



     AM CDT    procedure are in



         the results



         section.

 

 BASIC METABOLIC PANEL  Add-On  2019  4:30    Results for this



 (7)    AM CDT    procedure are in



         the results



         section.

 

 MAGNESIUM  Routine  2019  4:30    Results for this



     AM CDT    procedure are in



         the results



         section.

 

 URIC ACID  Routine  2019  4:30    Results for this



     AM CDT    procedure are in



         the results



         section.

 

 PHOSPHORUS  Routine  2019  4:30    Results for this



     AM CDT    procedure are in



         the results



         section.

 

 BASIC METABOLIC PANEL  Routine  2019  7:05    Results for this



 (7)    PM CDT    procedure are in



         the results



         section.

 

 FL UPPER GI W SMALL  STAT  2019  5:00    Results for this



 BOWEL & KUB    PM CDT    procedure are in



         the results



         section.

 

 (CELLAVISION MANUAL  Routine  2019  4:08    Results for this



 DIFF)    AM CDT    procedure are in



         the results



         section.

 

 CBC W/PLT COUNT & AUTO  Routine  2019  4:08    Results for this



 DIFFERENTIAL    AM CDT    procedure are in



         the results



         section.

 

 MAGNESIUM  Routine  2019  4:08    Results for this



     AM CDT    procedure are in



         the results



         section.

 

 BILIRUBIN, DIRECT  Routine  2019  4:08    Results for this



     AM CDT    procedure are in



         the results



         section.

 

 CBC W/PLT COUNT & AUTO  Routine  2019  4:08    Results for this



 DIFFERENTIAL    AM CDT    procedure are in



         the results



         section.

 

 COMPREHENSIVE METABOLIC  Routine  2019  4:08    Results for this



 PANEL    AM CDT    procedure are in



         the results



         section.

 

 PHOSPHORUS  Routine  2019  4:08    Results for this



     AM CDT    procedure are in



         the results



         section.

 

 LACTATE DEHYDROGENASE  Routine  2019  4:08    Results for this



 (LDH)    AM CDT    procedure are in



         the results



         section.

 

 URIC ACID  Routine  2019  4:08    Results for this



     AM CDT    procedure are in



         the results



         section.

 

 IR PERCUTANEOUS  Routine  2019  8:45    Results for this



 CHOLANGIOGRAM    PM CDT    procedure are in



         the results



         section.

 

 (CELLAVISION MANUAL  Routine  2019  4:28    Results for this



 DIFF)    AM CDT    procedure are in



         the results



         section.

 

 CBC W/PLT COUNT & AUTO  Routine  2019  4:28    Results for this



 DIFFERENTIAL    AM CDT    procedure are in



         the results



         section.

 

 CBC W/PLT COUNT & AUTO  Routine  2019  4:28    Results for this



 DIFFERENTIAL    AM CDT    procedure are in



         the results



         section.

 

 COMPREHENSIVE METABOLIC  Routine  2019  4:28    Results for this



 PANEL    AM CDT    procedure are in



         the results



         section.

 

 PHOSPHORUS  Routine  2019  4:28    Results for this



     AM CDT    procedure are in



         the results



         section.

 

 LACTATE DEHYDROGENASE  Routine  2019  4:28    Results for this



 (LDH)    AM CDT    procedure are in



         the results



         section.

 

 URIC ACID  Routine  2019  4:28    Results for this



     AM CDT    procedure are in



         the results



         section.

 

 CBC W/PLT COUNT & AUTO  Routine  2019  4:20    Results for this



 DIFFERENTIAL    AM CDT    procedure are in



         the results



         section.

 

 CBC W/PLT COUNT & AUTO  Routine  2019  4:20    Results for this



 DIFFERENTIAL    AM CDT    procedure are in



         the results



         section.

 

 COMPREHENSIVE METABOLIC  Routine  2019  4:20    Results for this



 PANEL    AM CDT    procedure are in



         the results



         section.

 

 PHOSPHORUS  Routine  2019  4:20    Results for this



     AM CDT    procedure are in



         the results



         section.

 

 LACTATE DEHYDROGENASE  Routine  2019  4:20    Results for this



 (LDH)    AM CDT    procedure are in



         the results



         section.

 

 URIC ACID  Routine  2019  4:20    Results for this



     AM CDT    procedure are in



         the results



         section.

 

 CBC W/PLT COUNT & AUTO  Routine  03/10/2019  4:13    Results for this



 DIFFERENTIAL    AM CDT    procedure are in



         the results



         section.

 

 CBC W/PLT COUNT & AUTO  Routine  03/10/2019  4:13    Results for this



 DIFFERENTIAL    AM CDT    procedure are in



         the results



         section.

 

 COMPREHENSIVE METABOLIC  Routine  03/10/2019  4:13    Results for this



 PANEL    AM CDT    procedure are in



         the results



         section.

 

 PHOSPHORUS  Routine  03/10/2019  4:13    Results for this



     AM CDT    procedure are in



         the results



         section.

 

 LACTATE DEHYDROGENASE  Routine  03/10/2019  4:13    Results for this



 (LDH)    AM CDT    procedure are in



         the results



         section.

 

 URIC ACID  Routine  03/10/2019  4:13    Results for this



     AM CDT    procedure are in



         the results



         section.

 

 CBC W/PLT COUNT & AUTO  Routine  2019  4:59    Results for this



 DIFFERENTIAL    AM CST    procedure are in



         the results



         section.

 

 VITAMIN B12 AND FOLATE  Routine  2019  4:59    Results for this



     AM CST    procedure are in



         the results



         section.

 

 TSH/FREE T4 IF INDICATED  Routine  2019  4:59    Results for this



     AM CST    procedure are in



         the results



         section.

 

 FERRITIN  Routine  2019  4:59    Results for this



     AM CST    procedure are in



         the results



         section.

 

 IRON, TIBC, % SAT.  Routine  2019  4:59    Results for this



 (WITHOUT FERRITIN)    AM CST    procedure are in



         the results



         section.

 

 CBC W/PLT COUNT & AUTO  Routine  2019  4:59    Results for this



 DIFFERENTIAL    AM CST    procedure are in



         the results



         section.

 

 COMPREHENSIVE METABOLIC  Routine  2019  4:59    Results for this



 PANEL    AM CST    procedure are in



         the results



         section.

 

 PHOSPHORUS  Routine  2019  4:59    Results for this



     AM CST    procedure are in



         the results



         section.

 

 LACTATE DEHYDROGENASE  Routine  2019  4:59    Results for this



 (LDH)    AM CST    procedure are in



         the results



         section.

 

 URIC ACID  Routine  2019  4:59    Results for this



     AM CST    procedure are in



         the results



         section.

 

 FL GUIDED LUMBAR  Routine  2019  2:14    Results for this



 PUNCTURE DIAG    PM CST    procedure are in



         the results



         section.

 

 PROTEIN, CSF  Routine  2019  2:10    Results for this



     PM CST    procedure are in



         the results



         section.

 

 FLOW CYTOMETRY  Routine  2019  2:08    Results for this



 REQUISITION    PM CST    procedure are in



         the results



         section.

 

 FLOW CYTOMETRY  Routine  2019  2:01    Results for this



     PM CST    procedure are in



         the results



         section.

 

 CYTOLOGY  AP Routine  2019  2:01    Results for this



     PM CST    procedure are in



         the results



         section.

 

 PROTHROMBIN TIME/INR  Routine  2019 10:35    Results for this



     AM CST    procedure are in



         the results



         section.

 

 (CELLAVISION MANUAL  Routine  2019  4:29    Results for this



 DIFF)    AM CST    procedure are in



         the results



         section.

 

 CBC W/PLT COUNT & AUTO  Routine  2019  4:29    Results for this



 DIFFERENTIAL    AM CST    procedure are in



         the results



         section.

 

 CBC W/PLT COUNT & AUTO  Routine  2019  4:29    Results for this



 DIFFERENTIAL    AM CST    procedure are in



         the results



         section.

 

 PHOSPHORUS  Routine  2019  4:29    Results for this



     AM CST    procedure are in



         the results



         section.

 

 LACTATE DEHYDROGENASE  Routine  2019  4:29    Results for this



 (LDH)    AM CST    procedure are in



         the results



         section.

 

 URIC ACID  Routine  2019  4:29    Results for this



     AM CST    procedure are in



         the results



         section.

 

 COMPREHENSIVE METABOLIC  Routine  2019  4:29    Results for this



 PANEL    AM CST    procedure are in



         the results



         section.

 

 CBC W/PLT COUNT & AUTO  Routine  2019  5:47    Results for this



 DIFFERENTIAL    AM CST    procedure are in



         the results



         section.

 

 CBC W/PLT COUNT & AUTO  Routine  2019  5:47    Results for this



 DIFFERENTIAL    AM CST    procedure are in



         the results



         section.

 

 CBC W/PLT COUNT & AUTO  Routine  2019  5:47    Results for this



 DIFFERENTIAL    AM CST    procedure are in



         the results



         section.

 

 CBC W/PLT COUNT & AUTO  Routine  2019  5:47    Results for this



 DIFFERENTIAL    AM CST    procedure are in



         the results



         section.

 

 PHOSPHORUS  Routine  2019  5:47    Results for this



     AM CST    procedure are in



         the results



         section.

 

 LACTATE DEHYDROGENASE  Routine  2019  5:47    Results for this



 (LDH)    AM CST    procedure are in



         the results



         section.

 

 URIC ACID  Routine  2019  5:47    Results for this



     AM CST    procedure are in



         the results



         section.

 

 COMPREHENSIVE METABOLIC  Routine  2019  5:47    Results for this



 PANEL    AM CST    procedure are in



         the results



         section.

 

 HEPATITIS B PCR,  Routine  2019  8:23    Results for this



 QUANTITATIVE    PM CST    procedure are in



         the results



         section.

 

 MAGNESIUM  Routine  2019  8:23    Results for this



     PM CST    procedure are in



         the results



         section.

 

 MAGNESIUM  ASAP  2019  8:23    Results for this



     PM CST    procedure are in



         the results



         section.

 

 XR ABDOMEN 1 VIEW  STAT  2019  5:00    Results for this



     PM CST    procedure are in



         the results



         section.

 

 XR CHEST 1 VIEW  STAT  2019  1:16    Results for this



 PORTABLE/BEDSIDE    PM CST    procedure are in



         the results



         section.

 

 XR ABDOMEN 1 VIEW  STAT  2019 11:09    Results for this



     AM CST    procedure are in



         the results



         section.

 

 HEPATITIS B SURFACE  Routine  2019  9:06    Results for this



 ANTIGEN    AM CST    procedure are in



         the results



         section.

 

 CBC W/PLT COUNT & AUTO  Routine  2019  4:22    Results for this



 DIFFERENTIAL    AM CST    procedure are in



         the results



         section.

 

 PHOSPHORUS  ASAP  2019  4:22    Results for this



     AM CST    procedure are in



         the results



         section.

 

 URIC ACID  ASAP  2019  4:22    Results for this



     AM CST    procedure are in



         the results



         section.

 

 LACTATE DEHYDROGENASE  ASAP  2019  4:22    Results for this



 (LDH)    AM CST    procedure are in



         the results



         section.

 

 MAGNESIUM  Routine  2019  4:22    Results for this



     AM CST    procedure are in



         the results



         section.

 

 CBC W/PLT COUNT & AUTO  Routine  2019  4:22    Results for this



 DIFFERENTIAL    AM CST    procedure are in



         the results



         section.

 

 COMPREHENSIVE METABOLIC  Routine  2019  4:22    Results for this



 PANEL    AM CST    procedure are in



         the results



         section.

 

 MR ABDOMEN WO CONTRAST  ASAP  2019 11:36    Results for this



 MRCP    PM CST    procedure are in



         the results



         section.

 

 XR ABDOMEN 1 VIEW  STAT  2019  6:07    Results for this



     PM CST    procedure are in



         the results



         section.

 

 XR ABDOMEN 1 VIEW  STAT  2019  1:27    Results for this



     PM CST    procedure are in



         the results



         section.

 

 CBC W/PLT COUNT & AUTO  Routine  2019  7:31    Results for this



 DIFFERENTIAL    AM CST    procedure are in



         the results



         section.

 

 MAGNESIUM  Routine  2019  7:31    Results for this



     AM CST    procedure are in



         the results



         section.

 

 CBC W/PLT COUNT & AUTO  Routine  2019  7:31    Results for this



 DIFFERENTIAL    AM CST    procedure are in



         the results



         section.

 

 LACTIC ACID, VENOUS  Routine  2019  2:16    Results for this



     AM CST    procedure are in



         the results



         section.

 

 COMPREHENSIVE METABOLIC  Routine  2019  2:16    Results for this



 PANEL    AM CST    procedure are in



         the results



         section.

 

 ECG 12-LEAD  Routine  2019  9:55    



     PM CST    









   Procedure Note - Interface, External Ris In - 2019 10:00 PM CST



Ventricular Rate 115 BPM



   Atrial Rate 115 BPM



   P-R Interval 144 ms



   QRS Duration 66 ms



   Q-T Interval 376 ms



   QTC Calculation(Bazett) 520 ms



   P Axis 48 degrees



   R Axis 20 degrees



   T Axis -62 degrees



   



   Sinus tachycardia



   ST & T wave abnormality, consider inferior ischemia



   Prolonged QT



   Abnormal ECG



   No previous ECGs available









 ECG 12-LEAD  Routine  2019  9:55    Results for this



     PM CST    procedure are in



         the results



         section.

 

 IR PERCUTANEOUS  Routine  2019  3:58    Results for this



 BILIARY DRAINAGE    PM CST    procedure are in



         the results



         section.

 

 REPORT OF PROCEDURE -    2019 12:52    



 ENDOSCOPY URL    PM CST    

 

 REPORT OF PROCEDURE -    2019 12:49    



 ENDOSCOPY URL    PM CST    

 

 ANGIOGRAM,CEREBRAL    2019 11:39  Bile duct  



     AM CST  obstruction  

 

 PULMONARY FUNCTION -    2019  7:51    



 SCAN    AM CST    

 

 BLOOD CULTURE  Routine  2019  5:47    Results for this



     AM CST    procedure are in



         the results



         section.

 

 CBC W/PLT COUNT & AUTO  Routine  2019  5:41    Results for this



 DIFFERENTIAL    AM CST    procedure are in



         the results



         section.

 

 MAGNESIUM  Routine  2019  5:41    Results for this



     AM CST    procedure are in



         the results



         section.

 

 CBC W/PLT COUNT & AUTO  Routine  2019  5:41    Results for this



 DIFFERENTIAL    AM CST    procedure are in



         the results



         section.

 

 COMPREHENSIVE  Routine  2019  5:41    Results for this



 METABOLIC PANEL    AM CST    procedure are in



         the results



         section.

 

 BLOOD CULTURE  Routine  2019  5:40    Results for this



     AM CST    procedure are in



         the results



         section.

 

 COMPREHENSIVE  Routine  2019  4:53    Results for this



 METABOLIC PANEL    AM CST    procedure are in



         the results



         section.

 

 ECHOCARDIOGRAM REPORT    2019  9:20    



 - SCAN    PM CST    

 

 US TESTICULAR WITH  Routine  2019  9:11    Results for this



 DOPPLER    PM CST    procedure are in



         the results



         section.

 

 2D ECHO W/ DOPPLER  Routine  2019  9:38    Results for this



 (CW/PW/COLOR)    AM CST    procedure are in



         the results



         section.

 

 CBC W/PLT COUNT & AUTO  Routine  2019  5:25    Results for this



 DIFFERENTIAL    AM CST    procedure are in



         the results



         section.

 

 CBC W/PLT COUNT & AUTO  Routine  2019  5:25    Results for this



 DIFFERENTIAL    AM CST    procedure are in



         the results



         section.

 

 MAGNESIUM  Routine  2019  5:25    Results for this



     AM CST    procedure are in



         the results



         section.

 

 PROTHROMBIN TIME/INR  Routine  2019  5:25    Results for this



     AM CST    procedure are in



         the results



         section.

 

 HEPATIC FUNCTION PANEL  Routine  2019  5:25    Results for this



     AM CST    procedure are in



         the results



         section.

 

 BASIC METABOLIC PANEL  Routine  2019  5:25    Results for this



 (7)    AM CST    procedure are in



         the results



         section.

 

 POCT-GLUCOSE METER  Routine  2019  9:38    Results for this



     AM CST    procedure are in



         the results



         section.

 

 FL ERCP  Routine  2019  9:20    Results for this



     AM CST    procedure are in



         the results



         section.

 

 FLOW CYTOMETRY  Routine  2019  8:36    Results for this



 REQUISITION    AM CST    procedure are in



         the results



         section.

 

 FLOW CYTOMETRY  Routine  2019  8:36    Results for this



     AM CST    procedure are in



         the results



         section.

 

 TISSUE EXAM  AP Routine  2019  8:25    Results for this



     AM CST    procedure are in



         the results



         section.

 

 FINE NEEDLE ASPIRATE  Routine  2019  8:19    Results for this



 (FNA) REQUEST    AM CST    procedure are in



         the results



         section.

 

 FINE NEEDLE ASPIRATION  AP Routine  2019  8:19    Results for this



 BY CLINICIAN    AM CST    procedure are in



         the results



         section.

 

 PROCEDURE W/ C-ARM    2019  8:00  Obstructive  



     AM CST  jaundice  









   Special Needs







   (C-ARM, LINEAR SCOPE)









 ERCP    2019  8:00 AM CST  Obstructive jaundice  









   Special Needs







   (C-ARM, LINEAR SCOPE)









 UPPER ENDOSCOPY,FNA W/ULTRASOUND    2019  8:00 AM CST  Obstructive 
jaundice  









   Special Needs







   (C-ARM, LINEAR SCOPE)









 CBC W/PLT COUNT & AUTO  Routine  2019  4:30 AM CST    Results for this



 DIFFERENTIAL        procedure are in the



         results section.

 

 CBC W/PLT COUNT & AUTO  Routine  2019  4:30 AM CST    Results for this



 DIFFERENTIAL        procedure are in the



         results section.

 

 MAGNESIUM  Routine  2019  4:30 AM CST    Results for this



         procedure are in the



         results section.

 

 PROTHROMBIN TIME/INR  Routine  2019  4:30 AM CST    Results for this



         procedure are in the



         results section.

 

 HEPATIC FUNCTION PANEL  Routine  2019  4:30 AM CST    Results for this



         procedure are in the



         results section.

 

 BASIC METABOLIC PANEL (7)  Routine  2019  4:30 AM CST    Results for this



         procedure are in the



         results section.

 

 CT ABDOMEN/PELVIS WITH IV  STAT  2019  8:10 PM CST    Results for this



 CONTRAST        procedure are in the



         results section.

 

 CT CHEST WITH IV CONTRAST  STAT  2019  8:10 PM CST    Results for this



         procedure are in the



         results section.

 

 HEPATITIS C ANTIBODY  Routine  2019  5:41 PM CST    Results for this



         procedure are in the



         results section.

 

 HEPATITIS B PANEL  Routine  2019  5:41 PM CST    Results for this



         procedure are in the



         results section.

 

 URINALYSIS W/ MICROSCOPIC  Routine  2019  4:06 PM CST    Results for this



         procedure are in the



         results section.

 

 SPIROMETRY  Routine  2019  2:41 PM CST    Results for this



         procedure are in the



         results section.

 

 CBC W/PLT COUNT & AUTO  Routine  2019  6:06 AM CST    Results for this



 DIFFERENTIAL        procedure are in the



         results section.

 

 HIV-1 ANTIGEN WITH HIV-1/2  Routine  2019  6:06 AM CST    Results for 
this



 ANTIBODY        procedure are in the



         results section.

 

 BETA 2 MICROGLOBULIN  Routine  2019  6:06 AM CST    Results for this



         procedure are in the



         results section.

 

 LACTATE DEHYDROGENASE (LDH)  Routine  2019  6:06 AM CST    Results for 
this



         procedure are in the



         results section.

 

 CBC W/PLT COUNT & AUTO  Routine  2019  6:06 AM CST    Results for this



 DIFFERENTIAL        procedure are in the



         results section.

 

 MAGNESIUM  Routine  2019  6:06 AM CST    Results for this



         procedure are in the



         results section.

 

 PROTHROMBIN TIME/INR  Routine  2019  6:06 AM CST    Results for this



         procedure are in the



         results section.

 

 HEPATIC FUNCTION PANEL  Routine  2019  6:06 AM CST    Results for this



         procedure are in the



         results section.

 

 BASIC METABOLIC PANEL (7)  Routine  2019  6:06 AM CST    Results for this



         procedure are in the



         results section.

 

 US ABDOMEN COMPLETE  ASAP  2019  2:25 AM CST    Results for this



         procedure are in the



         results section.



after 2018



Results

CBC with platelet count + automated diff (2019  4:16 AM CDT)Only the most 
recent of22 resultswithin the time period is included.





 Component  Value  Ref Range  Performed At

 

 WBC  6.6  3.5 - 10.5 K/L  [facility]

 

 RBC  3.40 (L)  4.63 - 6.08 M/L  [facility]

 

 Hemoglobin  9.3 (L)  13.7 - 17.5 GM/DL  [facility]

 

 Hematocrit  28.6 (L)  40.1 - 51.0 %  [facility]

 

 MCV  84.1  79.0 - 92.2 fL  [facility]

 

 MCH  27.4  25.7 - 32.2 pg  [facility]

 

 MCHC  32.5  32.3 - 36.5 GM/DL  [facility]

 

 RDW  13.2  11.6 - 14.4 %  [facility]

 

 Platelets  373  150 - 450 K/CU MM  [facility]

 

 MPV  9.0 (L)  9.4 - 12.4 fL  [facility]

 

 nRBC  0  0 - 0 /100 WBC  [facility]

 

 % Neutros  82  %  [facility]

 

 % Lymphs  16  %  [facility]

 

 % Monos  1  %  [facility]

 

 % Eos  0  %  [facility]

 

 % Baso  0  %  [facility]

 

 # Neutros  5.44 (H)  1.78 - 5.38 K/L  [facility]

 

 # Lymphs  1.06 (L)  1.32 - 3.57 K/L  [facility]

 

 # Monos  0.05 (L)  0.30 - 0.82 K/L  [facility]

 

 # Eos  0.00 (L)  0.04 - 0.54 K/L  [facility]

 

 # Baso  0.00 (L)  0.01 - 0.08 K/L  [facility]

 

 Immature Granulocytes-Relative  1  0 - 1 %  [facility]









 Specimen

 

 Blood - [place]









 Performing Organization  Address  City/State/Zipcode  Phone Number

 

 [address] [address]  [phone]
[phone]



 CENTER      



Uric acid (2019  4:16 AM CDT)Only the most recent of16 resultswithin the 
time period is included.





 Component  Value  Ref Range  Performed At

 

 Uric Acid  1.6 (L)  2.6 - 7.2 mg/dL  [facility]









 Specimen

 

 Blood - [place]









 Performing Organization  Address  [place] [place]

 

 [address] [address]  [phone]
[phone]



 CENTER      



Phosphorus (2019  4:16 AM CDT)Only the most recent of15 resultswithin the 
time period is included.





 Component  Value  Ref Range  Performed At

 

 Phosphorus  3.1  2.3 - 4.7 mg/dL  [facility]









 Specimen

 

 Blood - [place]









 Performing Organization  Address  [place] [place]

 

 [address] [address]  [phone]
[phone]



 CENTER      



Magnesium (2019  4:16 AM CDT)Only the most recent of16 resultswithin the 
time period is included.





 Component  Value  Ref Range  Performed At

 

 Magnesium  1.8  1.6 - 2.6 mg/dL  [facility]









 Specimen

 

 Blood - [place]









 Performing Organization  Address  [place] [place]

 

 [address] [address]  [phone]
[phone]



 CENTER      



Lactate dehydrogenase (LDH) (2019  4:16 AM CDT)Only the most recent of16 
resultswithin the time period is included.





 Component  Value  Ref Range  Performed At

 

 LDH  219  125 - 220 U/L  [facility]









 Specimen

 

 Blood - [place]









 Performing Organization  Address  [place] [place]

 

 [address] [id number] [phone]
[phone]



 CENTER      



Hepatic function panel (2019  4:16 AM CDT)Only the most recent of9 
resultswithin the time period is included.





 Component  Value  Ref Range  Performed At

 

 Protein, Total  5.9 (L)  6.0 - 8.3 gm/dL  [facility]

 

 Albumin  2.7 (L)  3.5 - 5.0 g/dL  [facility]

 

 Total Bilirubin  1.1  0.2 - 1.2 mg/dL  [facility]

 

 Bilirubin, Direct  0.5  0.1 - 0.5 mg/dL  [facility]

 

 Alkaline Phosphatase  96  40 - 150 U/L  [facility]

 

 AST  28  5 - 34 U/L  [facility]

 

 ALT  45  6 - 55 U/L  [facility]









 Specimen

 

 Blood - [place]









 Performing Organization  Address  City/[facility]/Zipcode  Phone Number

 

 [address] [address] [phone]
[phone]



 CENTER      



Basic Metabolic Panel (2019  4:16 AM CDT)Only the most recent of11 
resultswithin the time period is included.





 Component  Value  Ref Range  Performed At

 

 Sodium  137  136 - 145 meq/L  [facility]

 

 Potassium  3.4 (L)  3.5 - 5.1 meq/L  [facility]

 

 Chloride  104  98 - 107 meq/L  [facility]

 

 CO2  26  22 - 29 meq/L  [facility]

 

 BUN  11  7 - 21 mg/dL  [facility]

 

 Creatinine  0.59  0.57 - 1.25 mg/dL  [facility]

 

 Glucose  134 (H)  70 - 105 mg/dL  [facility]

 

 Calcium  8.3 (L)  8.4 - 10.2 mg/dL  [facility]

 

 EGFR  141Comment: ESTIMATED GFR IS  mL/min/1.73 sq m  [facility]



   NOT AS ACCURATE AS CREATININE    MEDICAL CENTER



   CLEARANCE IN PREDICTING    



   GLOMERULAR FILTRATION RATE.    



   ESTIMATED GFR IS NOT    



   APPLICABLE FOR DIALYSIS    



   PATIENTS.    









 Specimen

 

 Blood - [place]









 Performing Organization  Address  City/[facility]/Zipcode  Phone Number

 

 [facility] [address] [address]  [phone]
[phone]



 CENTER      



RHYTHM STRIP - SCAN (2019 10:42 AM CDT)Only the most recent of2 
resultswithin the time period is included.





 Narrative  Performed At

 

 This result has an attachment that is not available.



  



FL Lumbar Puncture Image-Guided (2019  1:07 PM CDT)Only the most recent 
of2 resultswithin the time period is included.





 Narrative  Performed At

 

 FINAL REPORT



  [facility]



  



  



 PATIENT ID: [id number]



  



  



  



 Procedure: Fluoroscopy guided lumbar puncture for intrathecal 



  



 chemotherapy



  



  



  



 Radiologist: Shad Medrano M.D.



  



  



  



 CLINICAL HISTORY: DLBCL, 12 mg MTX 50 hydrocortisone



  



  



  



 DESCRIPTION OF PROCEDURE:



  



  



  



 After obtaining informed consent, the patient was prepped and draped 



  



 on the fluoroscopy table following the usual sterile fashion for 



  



 lumbar puncture. 1% lidocaine was administered for local anesthesia. 



  



 Using fluoroscopic guidance, a 22-gauge spinal needle advanced into 



  



 the thecal sac at the L3-L4 level. After verification of clear CSF 



  



 return, the labeled syringe of methotrexate 12 mg and hydrocortisone 



  



 50 mg was hand injected into the thecal sac without difficulty. There 



  



 were no periprocedural complications. Fluoroscopy time: 0.1 minutes. 



  



 Images: 1.



  



  



  



 Impression: 



  



  



  



 Successful fluoroscopy guided lumbar puncture for administration of 



  



 intrathecal methotrexate and hydrocortisone.



  



  



  



 Signed: Shad Medrano MD



  



 Report Verified Date/Time:2019 15:29:47



  



  



  



 Reading Location: [address] Neuro Reading Room



  



 Electronically signed by: SHAD MEDRANO M.D. on 2019  



 03:29 PM



  



   









 Procedure Note

 

 Interface, External Ris In - 2019  3:31 PM CDT



FINAL REPORT



 



 PATIENT ID:   [id number]



 



 Procedure: Fluoroscopy guided lumbar puncture for intrathecal



 chemotherapy



 



 Radiologist: Shad Medrano M.D.



 



 CLINICAL HISTORY: DLBCL, 12 mg MTX 50 hydrocortisone



 



 DESCRIPTION OF PROCEDURE:



 



 After obtaining informed consent, the patient was prepped and draped



 on the fluoroscopy table following the usual sterile fashion for



 lumbar puncture. 1% lidocaine was administered for local anesthesia.



 Using fluoroscopic guidance, a 22-gauge spinal needle advanced into



 the thecal sac at the L3-L4 level. After verification of clear CSF



 return, the labeled syringe of methotrexate 12 mg and hydrocortisone



 50 mg was hand injected into the thecal sac without difficulty. There



 were no periprocedural complications. Fluoroscopy time: 0.1 minutes.



 Images: 1.



 



 Impression:



 



 Successful fluoroscopy guided lumbar puncture for administration of



 intrathecal methotrexate and hydrocortisone.



 



 Signed: Shad Medrano MD



 Report Verified Date/Time:  2019 15:29:47



 



 Reading Location[place] [facility]



       Electronically signed by: SHAD MEDRANO M.D. on 2019 03:29 PM



 









 Performing Organization  Address  City/State/Zipcode  Phone Number

 

 [facility]      



IR Port-a-Cath Placement (2019  9:35 AM CDT)





 Narrative  Performed At

 

 FINAL REPORT



  [facility]



  



  



 PATIENT ID: [id number]



  



  



  



 Right internal jugular chest port  



 insertion  



 



  



  



  



   



   



 



  



  



  



 History:  



 Lymphoma.  



 



  



   



   



  



  



  



  



 Modality: Sonography and fluoroscopy.



  



 



  



 Sedation: Versed 1.0 mg and fentanyl 50 mcg given intravenously for 



  



 conscious sedation.Vital signs were monitored throughout the 



  



 procedure by a nurse, and remained stable. Physician intra-service 



  



 time was 20 minutes. 



  



  



  



 :Stephen Dreyer, MD



  



  



  



 Assistant:None.  



  



  



  



  



 Approach: Right internal jugular  



 vein  



  



  



  



  



  



  



 Estimated blood loss:< 5 cc.



  



  



  



 Specimen: None.



  



  



  



 Fluoroscopy Time: 0.0 min.



  



 Reference Air Kerma (Ka, r): 0.1 mGy.



  



  



  



 Technique: 



  



 Informed written consent was obtained. Discussion of risks, benefits, 



  



 and alternatives were made with the patient. The patient expressed 



  



 understanding and agreed to proceed.A universal timeout was 



  



 performed prior to starting the procedure.All elements maximal 



  



 sterile barrier technique was utilized for this procedure, including 



  



 utilization of sterile scrub solution for skin prep, a large sterile 



  



 sheet to cover the areas of the patient that were not prepped, and 



  



 hand hygiene, mask, head covering, and sterile gown for performing 



  



 radiologist and scrub technologist.



  



  



  



 The skin was anesthetized with 2% lidocaine.Ultrasound evaluation 



  



 showed a patent and compressible right internal jugular vein, which 



  



 was punctured under direct real-time ultrasound guidance with a 



  



 micropuncture needle.An ultrasound image was saved to PACS. 



  



  



  



 A 0.018 inch wire was placed through the needle into the right 



  



 atrium. A 4 French micropuncture sheath was placed. A subcutaneous 



  



 tunnel and pocket were created in the right anterior chest wall by 



  



 blunt dissection.The pocket was flushed with antibiotic solution. A 



  



 6F Bard port was placed within the pocket and the catheter brought 



  



 through the tunnel. The catheter was cut at 23 cm. A peel-away sheath 



  



 was placed in the right IJ vein and the catheter was advanced through 



  



 the sheath, with its distal tip terminating in the cavoatrial 



  



 junction. The peel-away sheath was removed. The port was flushed and 



  



 aspirated easily following placement.The skin incision was closed 



  



 with 3-0 running subcuticular Monocryl and Steri-Strips.The small 



  



 jugular incision site was closed using Steri-Strips.The patient 



  



 tolerated the procedure well and left the department in the same 



  



 condition.



  



   



   



  



  



  



  



 Results:Spot radiograph of the chest demonstrates the new right IJ 



  



 Port-A-Cath to lie in the expected position with its tip overlying 



  



 the cavoatrial junction. Additionally, the patient has a large right 



  



 pleural  



 effusion.  



 



  



   



   



  



  



  



  



 Impression:  



 



  



  



  



 Successful, uncomplicated placement of a right internal jugular chest 



  



 port. The port is ready for immediate use. 



  



  



  



 Incidental note of a large right pleural effusion. 



  



  



  



 Signed: Dreyer, Stephen MD



  



 Report Verified Date/Time:2019 12:32:07



  



  



  



 Reading Location: [place] [id number] Angio Body Reading Room



  



  Electronically signed by: STEPHEN EDWARD DREYER on 2019  



 12:32 PM



  



   









 Procedure Note

 

 Interface, External Ris In - 2019 12:34 PM CDT



FINAL REPORT



 



 PATIENT ID:   [id number]



 



 Right internal jugular chest port insertion



 



 



 



 History: Lymphoma.



 



 



 Modality: Sonography and fluoroscopy.



 



 Sedation: Versed 1.0 mg and fentanyl 50 mcg given intravenously for



 conscious sedation.  Vital signs were monitored throughout the



 procedure by a nurse, and remained stable. Physician intra-service



 time was 20 minutes.



 



 :  Stephen Dreyer, MD



 



 Assistant:  None.



 



 Approach: Right internal jugular vein



 



 



 Estimated blood loss:  < 5 cc.



 



 Specimen: None.



 



 Fluoroscopy Time: 0.0 min.



 Reference Air Kerma (Ka, r): 0.1 mGy.



 



 Technique:



 Informed written consent was obtained. Discussion of risks, benefits,



 and alternatives were made with the patient. The patient expressed



 understanding and agreed to proceed.  A universal timeout was



 performed prior to starting the procedure.  All elements maximal



 sterile barrier technique was utilized for this procedure, including



 utilization of sterile scrub solution for skin prep, a large sterile



 sheet to cover the areas of the patient that were not prepped, and



 hand hygiene, mask, head covering, and sterile gown for performing



 radiologist and scrub technologist.



 



 The skin was anesthetized with 2% lidocaine.  Ultrasound evaluation



 showed a patent and compressible right internal jugular vein, which



 was punctured under direct real-time ultrasound guidance with a



 micropuncture needle.  An ultrasound image was saved to PACS.



 



 A 0.018 inch wire was placed through the needle into the right



 atrium. A 4 French micropuncture sheath was placed. A subcutaneous



 tunnel and pocket were created in the right anterior chest wall by



 blunt dissection.  The pocket was flushed with antibiotic solution. A



 6F Bard port was placed within the pocket and the catheter brought



 through the tunnel. The catheter was cut at 23 cm. A peel-away sheath



 was placed in the right IJ vein and the catheter was advanced through



 the sheath, with its distal tip terminating in the cavoatrial



 junction. The peel-away sheath was removed. The port was flushed and



 aspirated easily following placement.  The skin incision was closed



 with 3-0 running subcuticular Monocryl and Steri-Strips.  The small



 jugular incision site was closed using Steri-Strips.  The patient



 tolerated the procedure well and left the department in the same



 condition.



 



 



 Results:  Spot radiograph of the chest demonstrates the new right IJ



 Port-A-Cath to lie in the expected position with its tip overlying



 the cavoatrial junction. Additionally, the patient has a large right



 pleural effusion.



 



 



 Impression:



 



 Successful, uncomplicated placement of a right internal jugular chest



 port. The port is ready for immediate use.



 



 Incidental note of a large right pleural effusion.



 



 Signed: Dreyer, Stephen MD



 Report Verified Date/Time:  2019 12:32:07



 



 Reading Location: [place] [address] Angio Body Reading Room



      Electronically signed by: STEPHEN EDWARD DREYER on 2019 12:32 PM



 









 Performing Organization  Address  City/State/Zipcode  Phone Number

 

 [facility]      



XR chest 2 views (2019  9:15 PM CDT)Only the most recent of2 
resultswithin the time period is included.





 Narrative  Performed At

 

 FINAL REPORT



  [facility]



  



  



 PATIENT ID: [id number]



  



  



  



 Exam: Chest x-ray, PA and lateral views



  



  



  



 Clinical History: History of right pleural effusion status post 



  



 thoracentesis. Concern for recurrent effusion.



  



  



  



 Comparison: Chest radiograph 3/28/2019.



  



  



  



 Technique: Frontal and lateral views of the chest were obtained.



  



  



  



 Findings/impression:



  



 There is a moderate loculated right pleural effusion, not 



  



 significantly changed in size. There is associated compressive 



  



 atelectasis. The left lung is grossly clear. There is no pneumothorax.



  



 The heart is normal in size. The aorta is uncoiled. 



  



 There is a partially imaged catheter overlying the right upper 



  



 quadrant.



  



  



  



 Signed: Greta Mukherjee MD



  



 Report Verified Date/Time:2019 21:42:29



  



  



  



 Reading Location: [facility] B1 C013Y CT Body Reading Room



  



 Electronically signed by: GRETA MUKHERJEE MD on 2019  



 09:42 PM



  



   









 Procedure Note

 

 Interface, External Ris In - 2019  9:44 PM CDT



FINAL REPORT



 



 PATIENT ID:   [id number]



 



 Exam: Chest x-ray, PA and lateral views



 



 Clinical History: History of right pleural effusion status post



 thoracentesis. Concern for recurrent effusion.



 



 Comparison: Chest radiograph 3/28/2019.



 



 Technique: Frontal and lateral views of the chest were obtained.



 



 Findings/impression:



 There is a moderate loculated right pleural effusion, not



 significantly changed in size. There is associated compressive



 atelectasis. The left lung is grossly clear. There is no pneumothorax.



 The heart is normal in size. The aorta is uncoiled.



 There is a partially imaged catheter overlying the right upper



 quadrant.



 



 Signed: Greta Mukherjee MD



 Report Verified Date/Time:  2019 21:42:29



 



 Reading Location: [facility] B1 C013Y CT Body Reading Room



     Electronically signed by: GRETA MUKHERJEE MD on 2019 09:42 PM



 









 Performing Organization  Address  City/[facility]/Zipcode  Phone Number

 

 [facility]      



PT/aPTT (2019 11:46 AM CDT)





 Component  Value  Ref Range  Performed At

 

 Protime  14.5  11.7 - 14.7 seconds  [facility]

 

 INR  1.1  <=5.9  [facility]

 

 PTT  33.7  22.5 - 36.0 seconds  [facility]









 Specimen

 

 Blood - Arm, Left









 Narrative  Performed At

 

 RECOMMENDED COUMADIN/WARFARIN INR THERAPY  [facility]



 RANGES



  



 STANDARD DOSE: 2.0 - 3.0 Includes:  



 PROPHYLAXIS for venous thrombosis, systemic  



 embolization; TREATMENT for venous thrombosis  



 and/or pulmonary embolus.



  



 HIGH RISK: Target INR is 2.5-3.5 for patients  



 with mechanical heart valves.  









 Performing Organization  Address  City/[facility]/Zipcode  Phone Number

 

 [address] [address] [phone]
[phone]



 CENTER      



Comprehensive metabolic panel (2019 11:46 AM CDT)Only the most recent 
of12 resultswithin the time period is included.





 Component  Value  Ref Range  Performed At

 

 Protein, Total  7.5Comment: Specimen  6.0 - 8.3 gm/dL  [facility]



   markedly hemolyzed    [facility]

 

 Albumin  2.7 (L)Comment: Specimen  3.5 - 5.0 g/dL  [facility]



   markedly hemolyzed    [facility]

 

 Alkaline Phosphatase  121  40 - 150 U/L  [facility]

 

 Total Bilirubin  0.7Comment: Specimen  0.2 - 1.2 mg/dL  [facility]



   markedly hemolyzed    [facility]

 

 Sodium  136  136 - 145 meq/L  [facility]

 

 Potassium  4.5Comment: Specimen  3.5 - 5.1 meq/L  [facility]



   markedly hemolyzed    [facility]

 

 Chloride  107  98 - 107 meq/L  [facility]

 

 CO2  21 (L)  22 - 29 meq/L  [facility]

 

 BUN  12  7 - 21 mg/dL  [facility]

 

 Creatinine  0.70Comment: Specimen  0.57 - 1.25 mg/dL  [facility] hemolyzed    [facility]

 

 Glucose  117 (H)  70 - 105 mg/dL  [facility]

 

 Calcium  8.9  8.4 - 10.2 mg/dL  [facility]

 

 AST  53 (H)Comment: Specimen  5 - 34 U/L  [facility]



   markedly hemolyzed    [facility]

 

 ALT  43Comment: Specimen  6 - 55 U/L  [facility]



   markedly hemolyzed    [facility]

 

 EGFR  115Comment: ESTIMATED  mL/min/1.73 sq m  [facility]



   GFR IS NOT AS ACCURATE    [facility]



   AS CREATININE CLEARANCE    



   IN PREDICTING GLOMERULAR    



   FILTRATION RATE.    



   ESTIMATED GFR IS NOT    



   APPLICABLE FOR DIALYSIS    



   PATIENTS.    









 Specimen

 

 Blood - Arm, Left









 Performing Organization  Address  City/State/Zipcode  Phone Number

 

 [facility]  [address] [place] [address] [phone]
[phone]



 CENTER      



XR chest 1 view portable / bedside (2019  4:43 PM CDT)Only the most 
recent of2 resultswithin the time period is included.





 Narrative  Performed At

 

 FINAL REPORT



   [facility]



  



  



 PATIENT ID: [id number]



  



  



  



 Postthoracentesis chest dated 3/28/2019



  



  



  



 Comment:Post thoracentesis chest demonstrates no pneumothorax.



  



 Heart is normal in size. Small to moderate residual pleural effusion 



  



 is present. 



  



  



  



 Impression: No pneumothorax on the post thoracentesis chest.



  



  



  



 Signed: Pily Balbuena MD



  



 Report Verified Date/Time:2019 19:41:50



  



  



  



 Reading Location: [facility] Reading Room



  



 Electronically signed by: PILY BALBUENA M.D. on 2019  



 07:41 PM



  



   









 Procedure Note

 

 Interface, External Ris In - 2019  7:44 PM CDT



FINAL REPORT



 



 PATIENT ID:   [id number]



 



 Postthoracentesis chest dated 3/28/2019



 



 Comment:  Post thoracentesis chest demonstrates no pneumothorax.



 Heart is normal in size. Small to moderate residual pleural effusion



 is present.



 



 Impression: No pneumothorax on the post thoracentesis chest.



 



 Signed: Pily Balbuena MD



 Report Verified Date/Time:  2019 19:41:50



 



 Reading Location: [facility] C013W Consult Reading Room



       Electronically signed by: PILY BALBUENA M.D. on 2019 07:41 PM



 









 Performing Organization  Address  City/State/Zipcode  Phone Number

 

 [facility]      



US Thoracentesis (2019  3:55 PM CDT)





 Narrative  Performed At

 

 FINAL REPORT



   [facility]



  



  



 PATIENT ID: [id number]



  



  



  



 Exam:Ultrasound guided thoracentesis



  



  



  



 Clinical History:Pleural effusion



  



  



  



 Consent:Benefits and risks were explained to the patient who gave 



  



 consent to the procedure.



  



  



  



 Complication:None immediate



  



  



  



 Procedure:The patient was placed in uprightposition. The right 



  



 posterior chest was prepped and draped in usual sterile fashion. 1% 



  



 lidocaine was used as local anesthetic. Under ultrasound guidance, a 



  



 thoracentesis catheter was inserted into the pleural cavity. 



  



 Approximately 1400 cc of clear yellow fluid was aspirated. The 



  



 catheter was removed. The specimen was sent to the laboratory for 



  



 further analysis. The patient tolerated the procedure well without 



  



 any adverse reaction. A STAT chest x-ray was ordered. The patient 



  



 left the department in stable condition.



  



  



  



 Impression:Ultrasound guided right thoracentesis.



  



  



  



 Signed: Myesha Paniagua MD



  



 Report Verified Date/Time:2019 17:04:04



  



  



  



 Reading Location: [facility] B1 P006J Ultrasound Reading Room



  



 Electronically signed by: MYESHA PANIAGUA M.D. on 2019  



 05:04 PM



  



   









 Procedure Note

 

 Interface, External Ris In - 2019  5:06 PM CDT



FINAL REPORT



 



 PATIENT ID:   [id number]



 



 Exam:  Ultrasound guided thoracentesis



 



 Clinical History:  Pleural effusion



 



 Consent:  Benefits and risks were explained to the patient who gave



 consent to the procedure.



 



 Complication:  None immediate



 



 Procedure:  The patient was placed in uprightposition. The right



 posterior chest was prepped and draped in usual sterile fashion. 1%



 lidocaine was used as local anesthetic. Under ultrasound guidance, a



 thoracentesis catheter was inserted into the pleural cavity.



 Approximately 1400 cc of clear yellow fluid was aspirated. The



 catheter was removed. The specimen was sent to the laboratory for



 further analysis. The patient tolerated the procedure well without



 any adverse reaction. A STAT chest x-ray was ordered. The patient



 left the department in stable condition.



 



 Impression:  Ultrasound guided right thoracentesis.



 



 Signed: Myesha Paniagua MD



 Report Verified Date/Time:  2019 17:04:04



 



 Reading Location: [facility] B1 P006J Ultrasound Reading Room



       Electronically signed by: MYESHA PANIAGUA M.D. on 2019 05:04 PM



 









 Performing Organization  Address  City/State/Zipcode  Phone Number

 

 [facility]      



aPTT (2019  1:38 PM CDT)





 Component  Value  Ref Range  Performed At

 

 PTT  33.1  22.5 - 36.0 seconds  [facility]









 Specimen

 

 Blood









 Performing Organization  Address  City/[facility]/Zipcode  Phone Number

 

 [place] [address]  [phone]
[phone]



 CENTER      



Prothrombin time/INR (2019  1:38 PM CDT)Only the most recent of5 
resultswithin the time period is included.





 Component  Value  Ref Range  Performed At

 

 Protime  15.2 (H)  11.7 - 14.7 seconds  [facility]

 

 INR  1.2  <=5.9  [facility]









 Specimen

 

 Blood









 Narrative  Performed At

 

 RECOMMENDED COUMADIN/WARFARIN INR THERAPY  [facility]



 RANGES



  



 STANDARD DOSE: 2.0 - 3.0 Includes:  



 PROPHYLAXIS for venous thrombosis, systemic  



 embolization; TREATMENT for venous thrombosis  



 and/or pulmonary embolus.



  



 HIGH RISK: Target INR is 2.5-3.5 for patients  



 with mechanical heart valves.  









 Performing Organization  Address  City/[facility]/Zipcode  Phone Number

 

 [facility]  [address], [place] [address] [phone]
[phone]



 CENTER      



EKG-SCANNED (2019 10:24 AM CDT)





 Narrative  Performed At

 

 This result has an attachment that is not available.



  



Manual Differential (2019 10:13 AM CDT)Only the most recent of4 
resultswithin the time period is included.





 Component  Value  Ref Range  Performed At

 

 % Neutros  96  %  [facility]

 

 % Lymphs  3  %  [facility]

 

 % Eos  1  %  [facility]

 

 # Neutros  24.48 (H)  1.78 - 5.38 K/ul  [facility]

 

 # Lymphs  0.77 (L)  1.32 - 3.57 K/ul  [facility]

 

 # Eos  0.26  0.04 - 0.54 K/uL  [facility]

 

 Total Counted  100    [facility]

 

 RBC Morphology  Normal    [facility]

 

 WBC Morphology  Normal    [facility]

 

 Large Platelet  Present    [facility]

 

 Artifact  Present    [facility]

 

 Platelet Conc  Adequate    [facility]









 Specimen

 

 Blood - Central Venous Line









 Narrative  Performed At

 

 Received comment: 



  [facility]



 User comments: 



  



 Slide comments:  









 Performing Organization  Address  City/State/Zipcode  Phone Number

 

 [facility]  [address] [place] [address] [phone]
[phone]



 [place]      



Calcium, Ionized (2019 10:13 AM CDT)





 Component  Value  Ref Range  Performed At

 

 Calcium, Ion  1.01 (L)  1.12 - 1.27 mmol/L  [facility]

 

 pH, Blood  7.44    [facility]









 Specimen

 

 Blood - Central Venous Line









 Performing Organization  Address  City/State/Zipcode  Phone Number

 

 [facility]  [address] [place] [address] [phone]
[phone]



 CENTER      



FL upper gi with small bowel and KUB (2019  5:00 PM CDT)





 Narrative  Performed At

 

 FINAL REPORT



  [facility]



  



  



 PATIENT ID: [id number]



  



  



  



 INDICATION:



  



 Nausea and abdominal pain. Evaluate for small bowel obstruction.



  



  



  



 COMPARISON: 



  



 Abdomen radiograph 2019



  



  



  



 TECHNIQUE: 



  



 Small bowel follow-through.



  



 Fluoroscopy time 0.4 minutes.



  



 Fluoroscopic images 11.



  



  



  



 FINDINGS:



  



  radiograph demonstrates a internal/external biliary catheter 



  



 and an internal biliary catheter. Residual contrast in the colon 



  



 noted.



  



  



  



 The patient was administered thin barium by mouth. Serial radiographs 



  



 were performed. At 20 minutes contrast was in the stomach and 



  



 duodenum. At 40 minutes contrast passed into the jejunum and then 



  



 into the proximal ileum at one hour 30 minutes. At two hours small 



  



 bowel loops in the pelvis opacified. At two hours and 30 minutes 



  



 contrast progressed in the small bowel and demonstrated an absence of 



  



 bowel in the central abdomen. This is because of the patient's known 



  



 mesenteric mass as demonstrated on CT 2019. At three 



  



 hours and 30 minutes nearly all the contrast was out of the stomach 



  



 and proximal small bowel and in the distal small bowel and proximal 



  



 right colon.



  



  



  



 IMPRESSION:



  



 No small bowel obstruction.



  



  



  



 Signed: Isrrael Galicia MD



  



 Report Verified Date/Time:2019 17:06:13



  



  



  



 Reading Location: [facility] C013[facility] Ortho Consult Reading Room



  



  Electronically signed by: ISRRAEL GALICIA M.D. on 2019  



 05:06 PM



  



   









 Procedure Note

 

 Interface, External Ris In - 2019  5:18 PM CDT



FINAL REPORT



 



 PATIENT ID:   [id number]



 



 INDICATION:



 Nausea and abdominal pain. Evaluate for small bowel obstruction.



 



 COMPARISON:



 Abdomen radiograph 2019



 



 TECHNIQUE:



 Small bowel follow-through.



 Fluoroscopy time 0.4 minutes.



 Fluoroscopic images 11.



 



 FINDINGS:



  radiograph demonstrates a internal/external biliary catheter



 and an internal biliary catheter. Residual contrast in the colon



 noted.



 



 The patient was administered thin barium by mouth. Serial radiographs



 were performed. At 20 minutes contrast was in the stomach and



 duodenum. At 40 minutes contrast passed into the jejunum and then



 into the proximal ileum at one hour 30 minutes. At two hours small



 bowel loops in the pelvis opacified. At two hours and 30 minutes



 contrast progressed in the small bowel and demonstrated an absence of



 bowel in the central abdomen. This is because of the patient's known



 mesenteric mass as demonstrated on CT 2019. At three



 hours and 30 minutes nearly all the contrast was out of the stomach



 and proximal small bowel and in the distal small bowel and proximal



 right colon.



 



 IMPRESSION:



 No small bowel obstruction.



 



 Signed: Isrrael aGlicia MD



 Report Verified Date/Time:  2019 17:06:13



 



 Reading Location: [facility] C013X Ortho Consult Reading Room



  Electronically signed by: ISRRAEL GALICIA M.D. on 2019 05:06 PM



 









 Performing Organization  Address  City/[facility]/Zipcode  Phone Number

 

 [facility]      



Bilirubin, direct (2019  4:08 AM CDT)





 Component  Value  Ref Range  Performed At

 

 Bilirubin, Direct  1.6 (H)  0.1 - 0.5 mg/dL  [facility]









 Specimen

 

 Blood - Central Venous Line









 Performing Organization  Address  [facility]  Phone Number

 

 [place] [address]  [phone]
[phone]



 CENTER      



IR Percutaneous Cholangiogram (2019  8:45 PM CDT)





 Narrative  Performed At

 

 FINAL REPORT



  [facility]



  



  



 PATIENT ID: [id number]



  



  



  



 History: Lymphoma, biliary obstruction, malfunctioning right 



  



 internal/external biliary drainage catheter.



  



  



  



 PROCEDURE:



  



  



  



 Following informed written consent, the patient's existing right 



  



 internal/external biliary drainage catheter and surrounding skin site 



  



 were prepped and draped in the usual sterile manner. 2% lidocaine was 



  



 given locally for anesthesia. No conscious sedation was administered. 



  



 Vital signs were monitored by the attending radiologist and a 



  



 registered nurse during the procedure for approximately 20 minutes 



  



 and remained stable.



  



  



  



 Cholangiogram was first performed through the pre-existing catheter. 



  



 The catheter was then cut and removed over a wire. A Berenstein 



  



 catheter and 035 angled Glidewire were used to traverse the common 



  



 bile duct and reselect the duodenum. An Amplatz wire was placed 



  



 through the Berenstein catheter into the proximal small bowel. A new 



  



 8 French internal/external biliary drainage catheter was placed over 



  



 the wire, through the existing access tract and into position with 



  



 its distal tip coiled in the small bowel under fluoroscopic control. 



  



 The new catheter was injected with contrast to confirm its position. 



  



 The catheter was secured to the skin using 2-0 silk suture and capped 



  



 to internal drainage.



  



  



  



 Overall, the patient tolerated the procedure well without immediate 



  



 complications and was discharged from the department in stable 



  



 condition.



  



  



  



 FINDINGS:



  



  



  



 Multiple images obtained during the procedure show the patient's 



  



 pre-existing right internal/external biliary drainage catheter to lie 



  



 in an atypical position. The catheter been partially withdrawn with 



  



 its distal tip in the region of the common duct above the ampulla. 



  



 Summary sideholes lie outside the liver within the peritoneal cavity. 



  



 There appears to be moderate amount of ascites present surrounding 



  



 the liver.



  



  



  



 Following replacement of the catheter, the new catheter lies in 



  



 expected position with its distal tip coiled in the duodenal lumen. 



  



 Injected contrast is seen in the common bile duct as well as the 



  



 cystic duct and gallbladder. Contrast flows through the catheter into 



  



 the small bowel without evidence for obstruction.



  



  



  



 IMPRESSION:



  



  



  



 1. Successful uncomplicated replacement of the patient's 



  



 malpositioned right internal/external biliary drainage catheter as 



  



 described in detail above.



  



  



  



  



  



  



  



 Total fluoroscopy time: 4.0 minutes.



  



  



  



 Estimated total patient dose reported as (Ka,r): 150 mGy



  



  



  



 Signed: Renetta Andre MD



  



 Report Verified Date/Time:2019 18:03:48



  



  



  



 Reading Location: [place] [id number] Angio Body Reading Room



  



 Electronically signed by: RENETTA ANDRE M.D. on 2019  



 06:03 PM



  



   









 Procedure Note

 

 Interface, External Ris In - 2019  6:06 PM CDT



FINAL REPORT



 



 PATIENT ID:   [id number]



 



 History: Lymphoma, biliary obstruction, malfunctioning right



 internal/external biliary drainage catheter.



 



 PROCEDURE:



 



 Following informed written consent, the patient's existing right



 internal/external biliary drainage catheter and surrounding skin site



 were prepped and draped in the usual sterile manner. 2% lidocaine was



 given locally for anesthesia. No conscious sedation was administered.



 Vital signs were monitored by the attending radiologist and a



 registered nurse during the procedure for approximately 20 minutes



 and remained stable.



 



 Cholangiogram was first performed through the pre-existing catheter.



 The catheter was then cut and removed over a wire. A Berenstein



 catheter and 035 angled Glidewire were used to traverse the common



 bile duct and reselect the duodenum. An Amplatz wire was placed



 through the Berenstein catheter into the proximal small bowel. A new



 8 French internal/external biliary drainage catheter was placed over



 the wire, through the existing access tract and into position with



 its distal tip coiled in the small bowel under fluoroscopic control.



 The new catheter was injected with contrast to confirm its position.



 The catheter was secured to the skin using 2-0 silk suture and capped



 to internal drainage.



 



 Overall, the patient tolerated the procedure well without immediate



 complications and was discharged from the department in stable



 condition.



 



 FINDINGS:



 



 Multiple images obtained during the procedure show the patient's



 pre-existing right internal/external biliary drainage catheter to lie



 in an atypical position. The catheter been partially withdrawn with



 its distal tip in the region of the common duct above the ampulla.



 Summary sideholes lie outside the liver within the peritoneal cavity.



 There appears to be moderate amount of ascites present surrounding



 the liver.



 



 Following replacement of the catheter, the new catheter lies in



 expected position with its distal tip coiled in the duodenal lumen.



 Injected contrast is seen in the common bile duct as well as the



 cystic duct and gallbladder. Contrast flows through the catheter into



 the small bowel without evidence for obstruction.



 



 IMPRESSION:



 



 1. Successful uncomplicated replacement of the patient's



 malpositioned right internal/external biliary drainage catheter as



 described in detail above.



 



 



 



 Total fluoroscopy time: 4.0 minutes.



 



 Estimated total patient dose reported as (Ka,r): 150 mGy



 



 Signed: Renetta Andre MD



 Report Verified Date/Time:  2019 18:03:48



 



 Reading Location: [place] [address] Angio Body Reading Room



       Electronically signed by: RENETTA ANDRE M.D. on 2019 06:03 PM



 









 Performing Organization  Address  [facility]  Phone Number

 

 [facility]      



Vitamin B12 and Folate (2019  4:59 AM CST)





 Component  Value  Ref Range  Performed At

 

 Vitamin B12  559  213 - 816 pg/mL  [facility]

 

 Folate  6.6 (L)  >=7.0 ng/mL  [facility]









 Specimen

 

 Blood - PICC









 Performing Organization  Address  City/State/Zipcode  Phone Number

 

 [address] [address] [phone]
[phone]



 CENTER      



TSH/Free T4 If Indicated (2019  4:59 AM CST)





 Component  Value  Ref Range  Performed At

 

 TSH  0.79  0.35 - 4.94 uIU/mL  [facility]









 Specimen

 

 Blood - PICC









 Performing Organization  Address  City/[facility]/Zipcode  Phone Number

 

 [address] [place] [id number] [phone]
[phone]



 CENTER      



Iron, TIBC, % sat. (without ferritin) (2019  4:59 AM CST)





 Component  Value  Ref Range  Performed At

 

 Iron  69.0  40.0 - 160.0 ug/dL  [facility]

 

 TIBC  178 (L)  250 - 450 ug/dL  [facility]

 

 Iron % Saturation  39  20 - 55 %  [facility]









 Specimen

 

 Blood - PICC









 Performing Organization  Address  [facility]  Phone Number

 

 [address] [address] [phone]
[phone]



 CENTER      



Ferritin (2019  4:59 AM CST)





 Component  Value  Ref Range  Performed At

 

 Ferritin  712 (H)  5 - 275 ng/mL  [facility]









 Specimen

 

 Blood - PICC









 Performing Organization  Address  [facility]  Phone Number

 

 [address] [address] [phone]
[phone]



 [place]      



Protein, CSF (2019  2:10 PM CST)





 Component  Value  Ref Range  Performed At

 

 Protein, CSF  27  15 - 45 mg/dL  [facility]









 Specimen

 

 Cerebrospinal Fluid - Lumbar Puncture









 Performing Organization  Address  City[facility]  Phone [place]

 

 [address] [address] [phone]
[phone]



 [place]      



Flow Cytometry Requisition (2019  2:08 PM CST)Only the most recent of2 
resultswithin the time period is included.





 Component  Value  Ref Range  Performed At

 

 Flow Cytometry  See Separate Report    [facility]

 

 Case #  [id number]    [facility]









 Specimen

 

 Cerebrospinal Fluid









 Performing Organization  Address  [facility]  Phone Number

 

 [address] [address] [phone]
[phone]



 [place]      



Flow Cytometry (2019  2:01 PM CST)Only the most recent of2 resultswithin 
the time period is included.





 Component  Value  Ref Range  Performed At

 

 Case Report  Flow Cytometry Report
 Case: [id number]
    [facility]



   Authorizing Provider:René Gonzalez MD Collected:
 2019 1401    [facility]



   Ordering Location: [address] Received:
2019 1536    



    SERVICE

    



   Pathologist: Franken, Alicia Anne, MD

    



   Specimen:Other

    



       

 

 Flow Interpretation  CEREBROSPINAL FLUID, FLOW CYTOMETRY:    [facility]



   -NO MONOTYPIC B CELL POPULATION    [facility]



   -NO ABERRANT T CELL POPULATION    



   -EVALUATION LIMITED BY PAUCICELLULARITY    



   -SEE COMMENT    

 

 Flow Interpretation Comment  The patient's history of    [facility]



   high-grade B-cell lymphoma    [facility]



   is noted. There is no    



   definitive immunophenotypic    



   evidence of involvement by    



   non-Hodgkin lymphoma    



   including the patient's    



   previously diagnosed    



   high-grade B-cell lymphoma;    



   however, the evaluation is    



   limited by paucicellularity    



   and decreased viability    



   (84.3%). Correlation with    



   clinical, radiologic, and    



   other laboratory findings    



   is recommended for further    



   evaluation.    

 

 CPT Code(s)  05769    [facility]

 

 CLINICAL HISTORY  High-grade B-cell lymphoma    [facility]

 

 SPECIMEN SOURCE  Cerebrospinal fluid    [facility]

 

 CELLULAR BIOMARKER ANALYSIS  CD8, surface-kappa, CD56,    [facility]



   surface-lambda, CD5, CD19,    [facility]



   CD10, CD3, CD20, CD4, CD45    

 

 IMMUNOPHENOTYPIC FINDINGS  Specimen Viability: 84.3%      Number of Events 
Acquired: 00347    [facility]



   The following populations are identified:    



       



   Lymphocytes: Bright CD45+ lymphocytes comprise 19.5% of total cells. T cells 
show a CD4:CD8 ratio of 0.9 and normal expression of the pan T cell antigens 
CD3 and CD5. B cells are polytypic with a kappa:lambda ratio of 2.2.    



       



   Myeloid/monocytic populations: As identified by CD45 and light scatter 
characteristics, granulocytes comprise 28.7% of cells analyzed, and monocytes 
comprise 1.4% of total cells.    



       



   The remaining events analyzed represent nonviable cells, non-hematolymphoid 
cells, and debris.    

 

 DISCLAIMER  These tests were developed    [facility]



   and their performance    [facility]



   characteristics determined    



   by [facility]. They have    



   not been cleared or    



   approved by the U.S. Food    



   and Drug Administration.    



   The FDA has determined that    



   such clearance or approval    



   is not necessary. It should    



   not be regarded as    



   investigational or for    



   research. This laboratory    



   is certified under the    



   Clinical Laboratory    



   Improvement Amendments of    



   1988 ("CLIA") as qualified    



   to perform high-complexity    



   clinical testing.    

 

 Technical component was  [facility]



 performed at  [place], Department [facility]



   Pathology, [address] [place] [address],    



   Tel [phone]    

 

 Professional component was  [facility]



 performed at  [place], Department of    [facility]



   Pathology, [address] [place] [address],    



   Tel [phone]    









 Specimen

 

 Other









 Performing Organization  Address  City/State/Zipcode  Phone Number

 

 [address] [address] [phone]
[phone]



 [place]      



Cytology (2019  2:01 PM CST)





 Component  Value  Ref Range  Performed At

 

 Case Report  Medical Cytology Report
 Case: [id number]
    [facility]



   Authorizing Provider:Tomeka Mullen MD Collected:
 2019 1401    [facility]



   Ordering Location: [address] Received:
2019 0918    



    SERVICE

    



   Pathologist: Saray Caruso MD

    



   Specimen:CSF

    



       

 

 DIAGNOSIS  CEREBROSPINAL FLUID (CYTOSPINS):    [facility]



      - NEGATIVE FOR EPITHELIAL MALIGNANCY    [facility]



         Signing Pathologist Direct Phone Line: [phone]    

 

 CPT Code(s)  78044    [facility]

 

 CLINICAL DATA  High-grade B-cell lymphoma    [facility]

 

 SPECIMEN SOURCE  CEREBROSPINAL FLUID    [facility]

 

 GROSS DESCRIPTION  Prepared 2 cytospins from 6 ml colorless fluid    [facility]



   Collected: [id number]    [facility]



   Received: [id number]    

 

 STATEMENT OF ADEQUACY  Satisfactory    [facility]

 

 Gross assessment was  [facility]



 performed at  [place], Department of    [facility]



   Pathology, [address] [place] [address],    



   Tel [phone]    

 

 Technical component was  [facility]



 performed at  [place], Department [facility]



   Pathology, [address] [place] [address],    



   Tel [phone]    

 

 Professional component was  [facility]



 performed at  [place], Department [facility]



   Pathology, [address] [place] [address],    



   Tel [phone]    









 Specimen

 

 Cerebrospinal Fluid - CSF









 Narrative  Performed At

 

 This result has an attachment that is not available.



  









 Performing Organization  Address  City/State/Zipcode  Phone Number

 

 [address] [address] [phone]
[phone]



 [place]      



Hepatitis B PCR, quantitative (2019  8:23 PM CST)





 Component  Value  Ref Range  Performed At

 

 HBV PCR, Quantitative  HBV DNA not detected  HBV DNA not detected  [facility]









 Specimen

 

 Blood - Central Venous Line









 Narrative  Performed At

 

 This test uses a Real-Time Polymerase Chain  [facility]



 Reaction (RT-PCR) methodology and was  



 performed using [employer] AmpliPrep/STU  



 TaqMan HBV Test, v2.0 (Roche Molecular  



 Systems, Inc.).



  



  



  



 Reportable range for this assay is 20 -  



 170,000,000 IU per mL (1.30 - 8.23 Log  



 IU/mL).  









 Performing Organization  Address  City/State/Zipcode  Phone Number

 

 [address] [address] [phone]
[phone]



 [place]      



XR abdomen / KUB 1 view (2019  5:00 PM CST)Only the most recent of4 
resultswithin the time period is included.





 Narrative  Performed At

 

 FINAL REPORT



  [facility]



  



  



 PATIENT ID: [id number]



  



  



  



 ONE VIEW ABDOMEN



  



  



  



 HISTORY: Status post feeding tube placement



  



  



  



 COMPARISON: 1109 hours on 3/6/2019



  



  



  



 FINDINGS:



  



  



  



 Single supine AP image of the abdomen was obtained.



  



  



  



 The feeding tube has been advanced, with its tip now in the duodenal 



  



 bulb.



  



  



  



 The biliary drainage catheter in the right abdomen. A stent is noted 



  



 in the region of the pancreatic duct or common bile duct.



  



  



  



 No dilated bowel loops are identified.



  



  



  



 Signed: Jose Nunez MD



  



 Report Verified Date/Time:2019 17:31:04



  



  



  



 Reading Location: [facility] Consult Reading Room



  



  Electronically signed by: JOSE NUNEZ MD on 2019  



 05:31 PM



  



   









 Procedure Note

 

 Interface, External Ris In - 2019  5:33 PM CST



FINAL REPORT



 



 PATIENT ID:   [id number]



 



 ONE VIEW ABDOMEN



 



 HISTORY: Status post feeding tube placement



 



 COMPARISON: 1109 hours on 3/6/2019



 



 FINDINGS:



 



 Single supine AP image of the abdomen was obtained.



 



 The feeding tube has been advanced, with its tip now in the duodenal



 bulb.



 



 The biliary drainage catheter in the right abdomen. A stent is noted



 in the region of the pancreatic duct or common bile duct.



 



 No dilated bowel loops are identified.



 



 Signed: Jose Nunez MD



 Report Verified Date/Time:  2019 17:31:04



 



 Reading Location: [facility] Consult Reading Room



        Electronically signed by: JOSE NUNEZ MD on 2019 05:31 PM



 









 Performing Organization  Address  City/State/Zipcode  Phone Number

 

  [place]      



Hepatitis B surface antigen (2019  9:06 AM CST)





 Component  Value  Ref Range  Performed At

 

 hepatitis B Surface Ag  NON-REACTIVE  Nonreactive  [facility]









 Specimen

 

 Blood - Arm, Right









 Performing Organization  Address  City/State/Zipcode  Phone Number

 

 [facility]  [address] [place] [address]  [phone]
[phone]



 CENTER      



MR abdomen without IV contrast MRCP (2019 11:36 PM CST)





 Narrative  Performed At

 

 FINAL REPORT



  [facility]



  



  



 PATIENT ID: [id number]



  



  



  



 INDICATION:



  



 Mesenteric mass, lymphoma. Evaluate for biliary obstruction.



  



  



  



 COMPARISON: 



  



 Abdomen radiograph 2019



  



 Abdomen pelvis CT 2019



  



  



  



 TECHNIQUE: 



  



 MR of the Abdomen WITHOUT intravenous contrast.



  



 MRCP, including 3-D reconstructions.



  



  



  



 FINDINGS:



  



 As demonstrated on recent CT there is a bulky mass of the central 



  



 mesentery contiguous with periaortic lymphadenopathy. Right 



  



 transhepatic internal/external biliary drainage catheter and stent in 



  



 the common bile duct better demonstrated on radiography. Gallbladder 



  



 is partly collapsed. There is no biliary ductal dilatation. Tiny 



  



 amount of perihepatic and perisplenic fluid is noted. Spleen itself 



  



 is unremarkable. Adrenal glands and kidneys unremarkable. Visualized 



  



 bowel loops unremarkable.



  



  



  



 IMPRESSION:



  



 Central mesenteric mass, representing lymphoma.



  



  



  



 No biliary ductal dilatation.



  



  



  



 Signed: Isrrael Galicia MD



  



 Report Verified Date/Time:2019 00:05:45



  



  



  



 Reading Location: [facility] Consult Reading Room



  



  Electronically signed by: ISRRAEL GALICIA M.D. on 2019  



 12:05 AM



  



   









 Procedure Note

 

 Interface, External Ris In - 2019 12:43 AM CST



FINAL REPORT



 



 PATIENT ID:   [id number]



 



 INDICATION:



 Mesenteric mass, lymphoma. Evaluate for biliary obstruction.



 



 COMPARISON:



 Abdomen radiograph 2019



 Abdomen pelvis CT 2019



 



 TECHNIQUE:



 MR of the Abdomen WITHOUT intravenous contrast.



 MRCP, including 3-D reconstructions.



 



 FINDINGS:



 As demonstrated on recent CT there is a bulky mass of the central



 mesentery contiguous with periaortic lymphadenopathy. Right



 transhepatic internal/external biliary drainage catheter and stent in



 the common bile duct better demonstrated on radiography. Gallbladder



 is partly collapsed. There is no biliary ductal dilatation. Tiny



 amount of perihepatic and perisplenic fluid is noted. Spleen itself



 is unremarkable. Adrenal glands and kidneys unremarkable. Visualized



 bowel loops unremarkable.



 



 IMPRESSION:



 Central mesenteric mass, representing lymphoma.



 



 No biliary ductal dilatation.



 



 Signed: Isrrael Galicia MD



 Report Verified Date/Time:  2019 00:05:45



 



 Reading Location: [address] Consult Reading Room



  Electronically signed by: ISRRAEL GALICIA M.D. on 2019 12:05 AM



 









 Performing Organization  Address  City/State/Zipcode  Phone Number

 

 [facility]      



Lactic acid, venous, whole blood (2019  2:16 AM CST)





 Component  Value  Ref Range  Performed At

 

 Lactate, Venous  1.6  0.5 - 2.2 mmol/L  [facility]









 Specimen

 

 Blood - Arm, Left









 Narrative  Performed At

 

 Specimen slightly icteric  [facility]









 Performing Organization  Address  City/[facility]/Zipcode  Phone Number

 

 [place] [id number] [place] [address] [phone]
[phone]



 CENTER      



ECG 12 lead (2019  9:55 PM CST)





 Narrative  Performed At

 

 Ventricular Rate 115 BPM



  [facility]



 Atrial Rate 115 BPM



  



 P-R Interval 144 ms



  



 QRS Duration 66 ms



  



 Q-T Interval 376 ms



  



 QTC Calculation(Bazett) 520 ms



  



 P Axis 48 degrees



  



 R Axis 20 degrees



  



 T Axis -62 degrees



  



  



  



 Sinus tachycardia



  



 Prolonged QT



  



 Baseline artefact. Unable to comment on ST/T segments. 



  



 Abnormal ECG



  



 No previous ECGs available



  



 Please repeat EKG



  



 Confirmed by GRETCHEN HINDS, JOSE ([id number]) on 3/5/2019 8:03:25 AM  









 Procedure Note

 

 Interface, External Ris In - 2019  8:03 AM CST



Ventricular Rate 115 BPM



 Atrial Rate 115 BPM



 P-R Interval 144 ms



 QRS Duration 66 ms



 Q-T Interval 376 ms



 QTC Calculation(Bazett) 520 ms



 P Axis 48 degrees



 R Axis 20 degrees



 T Axis -62 degrees



 



 Sinus tachycardia



 Prolonged QT



 Baseline artefact. Unable to comment on ST/T segments.



 Abnormal ECG



 No previous ECGs available



 Please repeat EKG



 Confirmed by GRETCHEN HINDS BASANT ([id number]) on 3/5/2019 8:03:25 AM









 Performing Organization  Address  City/State/Zipcode  Phone Number

 

 [facility]      



IR Percutaneous Biliary Drainage (2019  3:58 PM CST)





 Narrative  Performed At

 

 FINAL REPORT



  [facility]



  



  



 PATIENT ID: [id number]



  



  



  



 History: Lymphoma, biliary obstruction.



  



  



  



 PROCEDURE:



  



  



  



 Following informed written consent, general endotracheal anesthesia 



  



 was administered and the patient's right lateral abdominal wall was 



  



 prepped and draped in the usual sterile manner. Using a 21-gauge 



  



 Chiba needle, fluoroscopic guidance and a right mid axillary 



  



 approach, access was gained to the intrahepatic bile duct. 



  



 Percutaneous transhepatic cholangiogram was performed. A second 



  



 21-gauge Chiba needle was then used to access a more peripheral right 



  



 intrahepatic duct. An 018 wire was advanced through the needle 



  



 centrally into the common hepatic duct. An AccuStick sheath was 



  



 placed. Repeat contrast injection was performed to confirm position 



  



 of the AccuStick sheath and the common hepatic duct. An 035 angled 



  



 Glidewire and 4 French Berenstein catheter were used to carefully 



  



 traverse the common bile duct and select the duodenum. The Glidewire 



  



 was then exchanged through the catheter for an Amplatz wire. The 



  



 tract was dilated. A 9 French peel-away sheath was placed. An 8.5 



  



 French internal/external biliary drainage catheter was then placed 



  



 over the wire and through the peel-away sheath into position with its 



  



 distal tip coiled in the duodenum and sideholes extending proximally 



  



 into the common hepatic duct. Repeat contrast injection was performed 



  



 to confirm position of the catheter. The catheter was then secured to 



  



 the skin using 2-0 silk suture and placed to external gravity bag 



  



 drainage.



  



  



  



 Overall, the patient tolerated the procedure well without immediate 



  



 complications and was discharged from the department to the recovery 



  



 room in stable condition.



  



  



  



 FINDINGS:



  



  



  



 Multiple images obtained during the procedure demonstrate mild 



  



 intrahepatic biliary ductal dilatation. There is a high-grade long 



  



 segment stricture of the distal common bile duct to the level of the 



  



 ampulla. A small amount of contrast is seen flowing through the 



  



 stricture into the common duct. Following placement of the 



  



 internal/external biliary drainage catheter, the catheter is noted to 



  



 lie in expected position with its distal tip coiled in the duodenum. 



  



 Sideholes extending proximally into the common hepatic duct.



  



  



  



 IMPRESSION:



  



  



  



 1. High-grade long segment stricture of the distal common bile duct 



  



 causing mild intrahepatic biliary ductal dilatation as described 



  



 above. This is consistent with patient's known lymphadenopathy in 



  



 this area.



  



  



  



 2. Successful uncomplicated placement of an 8.5 French right 



  



 internal/external biliary drainage catheter.



  



  



  



  



  



  



  



 Total fluoroscopy time: 28.3 minutes.



  



  



  



 Estimated total patient dose reported as (Ka,r): 676 mGy.



  



  



  



 Signed: Renetta Andre MD



  



 Report Verified Date/Time:2019 17:59:35



  



  



  



 Reading Location: [place] [id number] Angio Body Reading Room



  



 Electronically signed by: RENETTA ANDRE M.D. on 2019  



 05:59 PM



  



   









 Procedure Note

 

 Interface, External Ris In - 2019  6:01 PM CST



FINAL REPORT



 



 PATIENT ID:   [id number]



 



 History: Lymphoma, biliary obstruction.



 



 PROCEDURE:



 



 Following informed written consent, general endotracheal anesthesia



 was administered and the patient's right lateral abdominal wall was



 prepped and draped in the usual sterile manner. Using a 21-gauge



 Chiba needle, fluoroscopic guidance and a right mid axillary



 approach, access was gained to the intrahepatic bile duct.



 Percutaneous transhepatic cholangiogram was performed. A second



 21-gauge Chiba needle was then used to access a more peripheral right



 intrahepatic duct. An 018 wire was advanced through the needle



 centrally into the common hepatic duct. An AccuStick sheath was



 placed. Repeat contrast injection was performed to confirm position



 of the AccuStick sheath and the common hepatic duct. An 035 angled



 Glidewire and 4 French Berenstein catheter were used to carefully



 traverse the common bile duct and select the duodenum. The Glidewire



 was then exchanged through the catheter for an Amplatz wire. The



 tract was dilated. A 9 French peel-away sheath was placed. An 8.5



 French internal/external biliary drainage catheter was then placed



 over the wire and through the peel-away sheath into position with its



 distal tip coiled in the duodenum and sideholes extending proximally



 into the common hepatic duct. Repeat contrast injection was performed



 to confirm position of the catheter. The catheter was then secured to



 the skin using 2-0 silk suture and placed to external gravity bag



 drainage.



 



 Overall, the patient tolerated the procedure well without immediate



 complications and was discharged from the department to the recovery



 room in stable condition.



 



 FINDINGS:



 



 Multiple images obtained during the procedure demonstrate mild



 intrahepatic biliary ductal dilatation. There is a high-grade long



 segment stricture of the distal common bile duct to the level of the



 ampulla. A small amount of contrast is seen flowing through the



 stricture into the common duct. Following placement of the



 internal/external biliary drainage catheter, the catheter is noted to



 lie in expected position with its distal tip coiled in the duodenum.



 Sideholes extending proximally into the common hepatic duct.



 



 IMPRESSION:



 



 1. High-grade long segment stricture of the distal common bile duct



 causing mild intrahepatic biliary ductal dilatation as described



 above. This is consistent with patient's known lymphadenopathy in



 this area.



 



 2. Successful uncomplicated placement of an 8.5 French right



 internal/external biliary drainage catheter.



 



 



 



 Total fluoroscopy time: 28.3 minutes.



 



 Estimated total patient dose reported as (Ka,r): 676 mGy.



 



 Signed: Renetta Andre MD



 Report Verified Date/Time:  2019 17:59:35



 



 Reading Location: [place] [address] Angio Body Reading Room



       Electronically signed by: RENETTA ANDRE M.D. on 2019 05:59 PM



 









 Performing Organization  Address  City/State/Zipcode  Phone Number

 

  [facility]      



REPORT OF PROCEDURE - ENDOSCOPY URL (2019 12:52 PM CST)





 Narrative  Performed At

 

 This result has an attachment that is not available.



  



REPORT OF PROCEDURE - ENDOSCOPY URL (2019 12:49 PM CST)





 Narrative  Performed At

 

 This result has an attachment that is not available.



  



PULMONARY FUNCTION - SCAN (2019  7:51 AM CST)





 Narrative  Performed At

 

 This result has an attachment that is not available.



  



Blood Culture - Routine (Left Venipuncture) (2019  5:47 AM CST)Only the 
most recent of2 resultswithin the time period is included.





 Component  Value  Ref Range  Performed At

 

 Result  No growth in 5 days    [facility]









 Specimen

 

 Blood - Arm, Left









 Performing Organization  Address  City/State/Zipcode  Phone Number

 

 [place] [id number] [place] [address] [phone]
[phone]



 CENTER      



ECHOCARDIOGRAM REPORT - SCAN (2019  9:20 PM CST)





 Narrative  Performed At

 

 This result has an attachment that is not available.



  



US testicular with doppler (2019  9:11 PM CST)





 Narrative  Performed At

 

 FINAL REPORT



  [facility]



  



  



 PATIENT ID: [id number]



  



  



  



 U/S, TESTICULAR, WITH DOPPLER



  



  



  



 CLINICAL INDICATION: left testicaular pain



  



  



  



 COMPARISON: None



  



  



  



 TECHNIQUE: The scrotum was evaluated using real-time gray scale and 



  



 color and spectral Doppler sonography.



  



  



  



 FINDINGS: 



  



 Right testis:



  



  Size: 3.7 x 1.7 x 3.1 cm



  



  Echotexture: Normal with no focal lesions.



  



  Color Doppler: Preserved vascular flow. 



  



 A cystic structure anterior to the epididymis (image 27) and favored 



  



 to be secondary from the epididymis measures 2.9 x 0.8 x 1.9 cm. It 



  



 is unclear if this represents a fluid-filled loop of bowel or another 



  



 fluid-filled structure. No internal vascularity or internal echoes 



  



 are identified. 



  



  



  



 Left testis:



  



  Size: 3.7 x 1.7 x 2.8 cm



  



  Echotexture: Normal with no focal lesions.



  



  Color Doppler: Preserved vascular flow. 



  



  



  



 Epididymides:



  



  Size: Normal without focal lesion.



  



  Color Doppler: Preserved vascular flow.



  



  



  



 Hydrocele: Trace hydrocele bilaterally



  



  



  



 Varicocele: None



  



  



  



 Additional findings: None.



  



  



  



  



  



 IMPRESSION:



  



  



  



 Trace hydroceles. No etiology of left testicular pain identified.



  



  



  



 Signed: Enrico Rolle MD



  



 Report Verified Date/Time:2019 01:46:19



  



  



  



 Reading Location: [address] Neuro Reading Room



  



 Electronically signed by: ENRICO ROLLE MD on 2019  



 01:46 AM



  



   









 Procedure Note

 

 Interface, External Ris In - 2019  1:56 AM CST



FINAL REPORT



 



 PATIENT ID:   [id number]



 



 U/S, TESTICULAR, WITH DOPPLER



 



 CLINICAL INDICATION: left testicaular pain



 



 COMPARISON: None



 



 TECHNIQUE: The scrotum was evaluated using real-time gray scale and



 color and spectral Doppler sonography.



 



 FINDINGS:



 Right testis:



      Size: 3.7 x 1.7 x 3.1 cm



      Echotexture: Normal with no focal lesions.



      Color Doppler: Preserved vascular flow.



 A cystic structure anterior to the epididymis (image 27) and favored



 to be secondary from the epididymis measures 2.9 x 0.8 x 1.9 cm. It



 is unclear if this represents a fluid-filled loop of bowel or another



 fluid-filled structure. No internal vascularity or internal echoes



 are identified.



 



 Left testis:



      Size: 3.7 x 1.7 x 2.8 cm



      Echotexture: Normal with no focal lesions.



      Color Doppler: Preserved vascular flow.



 



 Epididymides:



      Size: Normal without focal lesion.



      Color Doppler: Preserved vascular flow.



 



 Hydrocele: Trace hydrocele bilaterally



 



 Varicocele: None



 



 Additional findings: None.



 



 



 IMPRESSION:



 



 Trace hydroceles. No etiology of left testicular pain identified.



 



 Signed: Enrico Rolle MD



 Report Verified Date/Time:  2019 01:46:19



 



 Reading Location: [facility] Reading Room



   Electronically signed by: ENRICO ROLLE MD on 2019 01:46 AM



 









 Performing Organization  Address  City/State/Zipcode  Phone Number

 

 [facility]      



2D Echo W/Doppler(CW/PW/Color) (2019  9:38 AM CST)





 Component  Value  Ref Range  Performed At

 

 Ejection Fraction      [facility]









 Narrative  Performed At

 

 Transthoracic Echocardiography Report (TTE)



  [facility]



  



  



  Demographics



  



  



  



  Patient NameMORA, ARMANDODate of  



 Study2019



  



  ATWOOD



  



  



  



  MRN [id number]  



 Gender Male



  



  



  



  Visit [id number]  



 Race 



  



  



  



  Accession [id number]  



 [id number]



  



  Number



  



  



  



  Date of Birth 1960  



 ReferringChristopher Physician KeilyMD



  



  



  



  Age 58 year(s)  



 SonographRangel Miles RDCS



  



  



  



   



 Interpreting Alex Montelongo MD



  



   



 Physician



  



  



  



 Procedure



  



  



  



  Type of



  



  Study TTE procedure:2DECHO W  



 DOPPLER(CW/PW/COLOR) (Routine)



  



  



  



 Indications:Mass.



  



  



  



 Clinical History



  



 HGB 12.4



  



 HCT 36.7 %



  



 GERD



  



  



  



 Height: 64 inches Weight: 82.55 kg (182 lbs) BSA:  



 1.88 m^2 BMI: 31.24 kg/m^2



  



  



  



 HR: 90 bpm BP: 143/85 mmHg



  



  



  



  Summary



  



  Normal left ventricular chamber size. Normal wall  



 thickness. Normal



  



  overall left ventricular systolic function. No  



 apparent segmental wall



  



  motion abnormalities.



  



  Estimated LVEF by qualitative assessment is  



 normal (>60%) .



  



  Grade 1 diastolic dysfunction (impaired  



 relaxation and low-normal LA



  



  pressure).



  



  The right ventricular chamber size and systolic  



 function are within normal



  



  limits.



  



  Estimated peak systolic PA pressure is 35-40 mmHg  



 .



  



  



  



  Signature



  



  



  



  -------------------------------------------------  



 ---------------



  



  Electronically signed by Alex Montelongo MD(Interpreting



  



  physician) on 2019 04:23 PM



  



  -------------------------------------------------  



 ---------------



  



  



  



  Findings



  



  



  



  Left Ventricle Normal left  



 ventricular chamber size. Normal wall



  



 th  



 ickness. Normal overall left ventricular systolic



  



 fu  



 nction. No apparent segmental wall motion



  



 ab  



 normalities.



  



 Es  



 timated LVEF by qualitative assessment is normal



  



 (>  



 60%) .



  



 Gr  



 radha 1 diastolic dysfunction (impaired relaxation



  



 an  



 d low-normal LA pressure).



  



  



  



  Left AtriumLA size is  



 normal (16-34 ml/m2) .



  



  



  



  Right VentricleThe right  



 ventricular chamber size and systolic



  



 fu  



 nction are within normal limits.



  



  



  



  Right Atrium RA size is  



 normal.



  



  



  



  Aortic Valve Normal AoV  



 structure.



  



  



  



  Mitral Valve Normal MV  



 structure.



  



  



  



  Tricuspid ValveTV structure is  



 normal.



  



 Es  



 timated peak systolic PA pressure is 35-40 mmHg .



  



  



  



  Pulmonic Valve A trace of  



 pulmonary regurgitation.



  



 No  



 rmal PV structure appears normal by available



  



 vi  



 ews.



  



  



  



  AortaAortic  



 root size (SInus of Valsalva diameter) is



  



 no  



 rmal .



  



  



  



  PericardiumNo pericardial  



 effusion is visualized.



  



  



  



  IVC/SVC/PA/PV/PleuralThe estimated RA  



 pressure by IVC dynamics 0-5mmHg .



  



  



  



 Chambers/Structures



  



  



  



  Left Atrium



  



  



  



  LA Volume: 30.97  



 ml LA  



 Area: 12.93 cm^2



  



  LA Vol. Index: 16 ml/m^2



  



  



  



  Left Ventricle



  



  



  



  LVIDd: 4.43  



 cm  



  LVEDV:89.3 ml



  



  LV Septum Diastolic: 1.12 cm



  



  LV PW Diastolic: 0.91 cm



  



  LVEDV Bear's:36.82  



 ml LV  



 Length: 6.76 cm



  



  LVESV Bear's:14.09 ml



  



  LVEF Bear's: 61.7  



 %  



 LVEDVI: 20 ml/m^2



  



   



 LVESVI:  



 7 ml/m^2



  



  LVOT Diameter: 2.19 cm



  



  



  



 Doppler/Quantitative Measurements



  



  



  



  Mitral Valve



  



  



  



  MV Peak E-Wave: 0.77 m/s MV  



 Peak A-Wave: 0.78 m/s



  



   



 E/A Ratio: 0.99



  



   



 Peak Gradient: 2.39 mmHg



  



   



 Deceleration Time: 211.9  



 msec



  



  



  



  MV Shaun. Peak:



  



  



  



  Aortic Valve



  



  



  



  Peak Velocity: 1.36  



 m/sMean  



 Velocity: 0.94 m/s



  



  Peak Gradient: 7.39  



 mmHg Mean  



 Gradient: 4.03 mmHg



  



  AV Area (continuity): 3.91 cm^2



  



  AV VTI: 22.22 cm



  



  



  



  AV DVI: 1.04



  



  



  



  LVOT



  



  



  



  Peak Velocity: 1.29 m/s  



 Peak Gradient: 6.73 mmHg



  



  Mean Velocity: 0.81 m/s  



 Mean Gradient: 3.13 mmHg



  



  LVOT Diameter: 2.19  



 cmLVOT VTI: 23.06 cm



  



  LVOT Area: 3.77  



 cm^2LVOT SV:86.82  



 ml



  



  LVOT CO: 7.81  



 l/min LVOT CI:  



 4.15 l/min/m^2



  



  



  



  Tricuspid Valve



  



  



  



  TR Velocity: 3.01 m/s



  



  TR Gradient: 36.13 mmHg



  



   









 Procedure Note

 

 Interface, External Ris In - 2019  4:23 PM CST



Transthoracic Echocardiography Report (TTE)



 



  Demographics



 



  Patient Name  CAR SANCHEZ    Date of Study      2019



                ATWOOD



 



  MRN           [id number]         Gender             Male



 



  Visit Number  [id number]       Race               



 



  Accession     [id number]        Room Number        918



  Number



 



  Date of Birth 1960       Referring          Physician RAMSEY Rolle



 



  Age           58 year(s)       Sonographer        Monse Miles [facility]



 



                                 Interpreting       Alex Montelongo MD



                                 Physician



 



 Procedure



 



  Type of



  Study     TTE procedure:2DECHO W DOPPLER(CW/PW/COLOR) (Routine)



 



 Indications:Mass.



 



 Clinical History



 HGB 12.4



 HCT 36.7 %



 GERD



 



 Height: 64 inches Weight: 82.55 kg (182 lbs) BSA: 1.88 m^2 BMI: 31.24 kg/m^2



 



 HR: 90 bpm BP: 143/85 mmHg



 



  Summary



  Normal left ventricular chamber size. Normal wall thickness. Normal



  overall left ventricular systolic function. No apparent segmental wall



  motion abnormalities.



  Estimated LVEF by qualitative assessment is normal (>60%) .



  Grade 1 diastolic dysfunction (impaired relaxation and low-normal LA



  pressure).



  The right ventricular chamber size and systolic function are within normal



  limits.



  Estimated peak systolic PA pressure is 35-40 mmHg .



 



  Signature



 



  ----------------------------------------------------------------



  Electronically signed by Alex Montelongo MD(Interpreting



  physician) on 2019 04:23 PM



  ----------------------------------------------------------------



 



  Findings



 



  Left Ventricle         Normal left ventricular chamber size. Normal wall



                         thickness. Normal overall left ventricular systolic



                         function. No apparent segmental wall motion



                         abnormalities.



                         Estimated LVEF by qualitative assessment is normal



                         (>60%) .



                         Grade 1 diastolic dysfunction (impaired relaxation



                         and low-normal LA pressure).



 



  Left Atrium            LA size is normal (16-34 ml/m2) .



 



  Right Ventricle        The right ventricular chamber size and systolic



                         function are within normal limits.



 



  Right Atrium           RA size is normal.



 



  Aortic Valve           Normal AoV structure.



 



  Mitral Valve           Normal MV structure.



 



  Tricuspid Valve        TV structure is normal.



                         Estimated peak systolic PA pressure is 35-40 mmHg .



 



  Pulmonic Valve         A trace of pulmonary regurgitation.



                         Normal PV structure appears normal by available



                         views.



 



  Aorta                  Aortic root size (SInus of Valsalva diameter) is



                         normal .



 



  Pericardium            No pericardial effusion is visualized.



 



  IVC/SVC/PA/PV/Pleural  The estimated RA pressure by IVC dynamics 0-5mmHg .



 



 Chambers/Structures



 



  Left Atrium



 



  LA Volume: 30.97 ml                     LA Area: 12.93 cm^2



  LA Vol. Index: 16 ml/m^2



 



  Left Ventricle



 



  LVIDd: 4.43 cm                               LVEDV:89.3 ml



  LV Septum Diastolic: 1.12 cm



  LV PW Diastolic: 0.91 cm



  LVEDV Bear's:36.82 ml                     LV Length: 6.76 cm



  LVESV Bear's:14.09 ml



  LVEF Bear's: 61.7 %                       LVEDVI: 20 ml/m^2



                                               LVESVI: 7 ml/m^2



  LVOT Diameter: 2.19 cm



 



 Doppler/Quantitative Measurements



 



  Mitral Valve



 



  MV Peak E-Wave: 0.77 m/s           MV Peak A-Wave: 0.78 m/s



                                     E/A Ratio: 0.99



                                     Peak Gradient: 2.39 mmHg



                                     Deceleration Time: 211.9 msec



 



  MV Shaun. Peak:



 



  Aortic Valve



 



  Peak Velocity: 1.36 m/s                  Mean Velocity: 0.94 m/s



  Peak Gradient: 7.39 mmHg                 Mean Gradient: 4.03 mmHg



  AV Area (continuity): 3.91 cm^2



  AV VTI: 22.22 cm



 



  AV DVI: 1.04



 



  LVOT



 



  Peak Velocity: 1.29 m/s             Peak Gradient: 6.73 mmHg



  Mean Velocity: 0.81 m/s             Mean Gradient: 3.13 mmHg



  LVOT Diameter: 2.19 cm              LVOT VTI: 23.06 cm



  LVOT Area: 3.77 cm^2                LVOT SV:86.82 ml



  LVOT CO: 7.81 l/min                 LVOT CI: 4.15 l/min/m^2



 



  Tricuspid Valve



 



  TR Velocity: 3.01 m/s



  TR Gradient: 36.13 mmHg



 









 Performing Organization  Address  City[facility]  Phone Number

 

 [facility]ART[facility]      



POC-Glucose meter (2019  9:38 AM CST)





 Component  Value  Ref Range  Performed At

 

 POC-Glucose Meter  70Comment: TESTED AT  70 - 110 mg/dL  [address] [address]    









 Specimen

 

 Blood









 Performing Organization  Address  City[facility]  Phone Number

 

 [address] [address] [phone]
[phone]



 CENTER      



FL ERCP (2019  9:20 AM CST)





 Narrative  Performed At

 

 FINAL REPORT



  [facility]



  



  



 PATIENT ID: [id number]



  



  



  



 Fluoroscopic spot imaging was performed at the time of the procedure 



  



 by the ordering service. This examination is nondiagnostic. 



  



 Fluoroscopy was not performed by the undersigned, and the radiologist 



  



 was not present at the time of examination. Interpretation of the 



  



 images was not requested.



  



  



  



 Total fluoroscopy time: 4.4 minutes



  



  



  



 Total number of films: 1



  



  



  



 Please refer to the referring physician's procedure report for 



  



 complete detail. 



  



  



  



 Signed: Pily Balbuena MD



  



 Report Verified Date/Time:2019 14:28:48



  



  



  



 Reading Location: [facility] Consult Reading Room



  



 Electronically signed by: PILY BALBUENA M.D. on 2019  



 02:28 PM



  



   









 Procedure Note

 

 Interface, External Ris In - 2019  2:30 PM CST



FINAL REPORT



 



 PATIENT ID:   [id number]



 



 Fluoroscopic spot imaging was performed at the time of the procedure



 by the ordering service. This examination is nondiagnostic.



 Fluoroscopy was not performed by the undersigned, and the radiologist



 was not present at the time of examination. Interpretation of the



 images was not requested.



 



 Total fluoroscopy time: 4.4 minutes



 



 Total number of films: 1



 



 Please refer to the referring physician's procedure report for



 complete detail.



 



 Signed: Pily Balbuena MD



 Report Verified Date/Time:  2019 14:28:48



 



 Reading Location: [facility] C013[facility] Consult Reading Room



       Electronically signed by: PILY BALBUENA M.D. on 2019 02:28 PM



 









 Performing Organization  Address  City/State/Zipcode  Phone Number

 

 [place] [facility]      



Tissue Exam (2019  8:25 AM CST)





 Component  Value  Ref Range  Performed At

 

 Case Report  Surgical Pathology Report
 Case: [id number]
    [facility]



   Authorizing Provider:Chuck Melgoza MDCollected:
 2019 0825    [facility]



   Ordering Location: [address] Received:
2019 1435    



    Service

    



   Pathologist: Radha Mejias MD
 
   



   Specimen:Pancreas, Anne Pancreatic Lymphnode
    



       

 

 ADDENDUM  As reported by [facility],    [facility]



   cytogenetic FISH results are    [facility]



   POSITIVE for a variant    



   t(14;18)/IGH-BCL2 gene    



   rearrangement and a 3'MYC gene    



   deletion with gains of MYC,    



   however a variant MYC    



   rearrangement with an unknown    



   partner cannot be excluded.    



   Therefore, a "double hit"    



   lymphoma cannot be excluded    



   without ruling out a variant MYC    



   rearrangement (see attached    



   cytogenetics report).    

 

 DIAGNOSIS  LYMPH NODE, PERIPANCREATIC, CORE NEEDLE BIOPSY:    [facility]



   -HIGH GRADE B-CELL LYMPHOMA; PENDING CYTOGENETIC FISH STUDIES    [facility]



   -SEE COMMENT    



         Signing Pathologist Direct Phone Line: [phone]    

 

 COMMENT  Morphologic review, together    [facility]



   with the flow cytometric studies    [facility]



   ([id number]) and    



   immunohistochemical stains (see    



   microscopic), are diagnostic of    



   a B cell lymphoid neoplasm with    



   a germinal center phenotype and    



   high grade features, including    



   overexpression of the MYC and    



   BCL2 proteins, and an increased    



   Ki-67 proliferative index of    



   95%.  Cytogenetic FISH studies    



   to evaluate for MYC, BCL2,    



   and/or BCL6 gene rearrangements    



   have been requested for further    



   characterization.  These results    



   will follow in an addendum.  Dr. Radha Mejias discussed the    



   case with Dr. Lashay Wallace on 3/1/2019.    

 

 CPT Code(s)  07172 ,80847, 88341 x 9    [facility]

 

 CLINICAL HISTORY  Obstructive jaundice,    [facility]



   lymphadenopathy    [facility]

 

 SPECIMEN SOURCE  Peripancreatic lymph node    [facility]

 

 GROSS DESCRIPTION  The specimen is received in a    [facility]



   formalin-filled container    [facility]



   labeled with the patient's    



   information and labeled    



   "peripancreatic lymph node" and    



   consists of multiple small    



   fragments of tan-red soft tissue    



   all measuring less than 0.1 to    



   0.1 cm, submitted entirely in    



   A1. CG/ew    

 

 MICROSCOPIC DESCRIPTION  Sections show predominantly    [facility]



   crushed lymphoid cells. In areas    [facility]



   with better cellular    



   preservation, the neoplastic    



   cells have fine chromatin,    



   occasional small nucleoli, and    



   are associated with numerous    



   mitoses and apoptotic debris.    



   Immunohistochemical stains    



   performed show the neoplastic    



   cells are CD20 positive, MYC    



   positive (95% of neoplastic    



   population positive), BCL2    



   positive,  BCL6 positive, cyclin    



   D1 negative, MUM1 negative, TdT    



   negative, with a Ki67    



   proliferation index of 95%.  CD3    



   highlights scattered background    



   T cells.  No follicular    



   dendritic cell meshworks are    



   identified with CD21.    

 

 SPECIAL STUDIES  The interpretation of this case included the use of 
immunohistochemistry or special stains.    [facility]



   Block A1: CD20, CD3, MYC, MUM1, BCL2, BCL6, Ki67, Cyclin D1, TdT, CD21    



       



   Immunohistochemistry technical testing was performed at [facility], Pathology Laboratory where it was developed and its 
performance characteristics were determined. It has not be    



   en cleared or approved by the U.S. Food and Drug Administration. The FDA has 
determined that such clearance or approval is not necessary. The test is used 
for clinical purposes. It should not be regarde    



   d as investigational or for research. This laboratory is certified under the 
Clinical Laboratory Improvement Amendments of 1988 (CLIA-88) as qualified to 
perform high complexity clinical laboratory testing.    









 Specimen

 

 Tissue - Pancreas









 Narrative  Performed At

 

 This result has an attachment that is not available.



  









 Performing Organization  Address  City/State/Zipcode  Phone Number

 

 [facility]  [address], [place] [address] [phone]
[phone]



 [place]      



FINE NEEDLE ASPIRATE (FNA) REQUEST (2019  8:19 AM CST)





 Component  Value  Ref Range  Performed At

 

 Cytology  See Separate Report    [facility]









 Specimen

 

 Fine Needle Aspirate - Pancreatic









 Performing Organization  Address  City/State/Zipcode  Phone Number

 

 [address] [address]  [phone]
[phone]



 [place]      



Fine Needle Aspirate by Clinician (2019  8:19 AM CST)





 Component  Value  Ref Range  Performed At

 

 Case Report  Medical Cytology Report
 Case: [id number]
    [facility]



   Authorizing Provider:Chuck Melgoza MDCollected:
 2019 0819    [facility]



   Ordering Location: [address] Received:
2019 1057    



    Service

    



   Pathologist: Saray Caruso MD

    



   Specimen:Pancreatic, Anne pancreatic lymph node
    



       

 

 ADDENDUM  This addendum is issued to update the diagnosis based on the flow 
report on [id number] and the surgical correlate [id number]:    [facility]



   PERIPANCREATIC LYMPH NODE FNA BY CLINICIAN (CYTOSPINS AND CELL BLOCK OF 
ASPIRATE):    



   - HIGH GRADE B-CELL LYMPHOMA; PENDING CYTOGENETIC STUDIES    



   - NEGATIVE FOR EPITHELIAL MALIGNANCY    

 

 DIAGNOSIS  PERIPANCREATIC LYMPH NODE FNA BY CLINICIAN (CYTOSPINS AND CELL 
BLOCK OF ASPIRATE):    [facility]



      - NEGATIVE FOR EPITHELIAL MALIGNANCY (SEE COMMENT)    [facility]



       



         Signing Pathologist Direct Phone Line: [phone]    

 

 COMMENT  Please refer to to cases    [facility]



   [id number] and [id number] for    [facility]



   additional information.    

 

 CPT Code(s)  61127, 56638    [facility]

 

 CLINICAL DATA  Lymphadenopathy    [facility]

 

 SPECIMEN SOURCE  PERIPANCREATIC LYMPH NODE    [facility]



   FNA    [facility]

 

 GROSS DESCRIPTION  15 mls in cytorich red; 4 cytospins, cell block    [facility]



   Collected: [id number]    [facility]



   Received: [id number]    

 

 Gross assessment was  [facility]



 performed at  Center, Department of    [facility]



   Pathology, [address] [place] [address],    



   Tel [phone]    

 

 Technical component was  [facility]



 performed at  [place], Department of    [facility]



   Pathology, [address] [place] [address],    



   Tel [phone]    

 

 Professional component  [facility]



 was performed at  [place], Department of    [facility]



   Pathology, [address] [place] [address],    



   Tel [phone]    









 Specimen

 

 Fine Needle Aspirate - Pancreatic









 Narrative  Performed At

 

 This result has an attachment that is not available.



  









 Performing Organization  Address  City/State/Zipcode  Phone Number

 

 [address] [id number] [phone]
[phone]



 [place]      



CT abdomen/pelvis with IV contrast (2019  8:10 PM CST)





 Narrative  Performed At

 

 FINAL REPORT



  [facility]



  



  



 PATIENT ID: [id number]



  



  



  



 History: Adenopathy, suspected lymphoma.



  



  



  



 TECHNIQUE:



  



  



  



 Helical CT of the wrist, abdomen and pelvis were performed following 



  



 the uneventful administration of intravenous and positive oral 



  



 contrast utilizing multiple windows, sagittal and coronal 



  



 reformations.



  



  



  



 This exam was performed according to our departmental diverticulitis. 



  



 Optimization program which includes automated exposure control, 



  



 adjustment of the mA and/or KV according to the patient's size and/or 



  



 use of iterative reconstruction technique.



  



  



  



 FINDINGS: No comparisons are available.



  



  



  



 Chest CT: Extensive mediastinal adenopathy is present, the largest 



  



 nodes in the subcarinal and pretracheal spaces. Enlarged left 



  



 supraclavicular lymph nodes are also identified. The heart, 



  



 mediastinum and great vessels are otherwise unremarkable. Thyroid 



  



 gland is unremarkable. Central airways are patent. There is slight 



  



 increased opacity in the right lower lobe, possibly atelectasis 



  



 and/or lung scarring. Lungs are otherwise clear. No suspicious 



  



 pulmonary nodules or masses. Bones are unremarkable.



  



  



  



 Abdominal CT: Mild intrahepatic biliary ductal dilatation is present. 



  



 The gallbladder is mildly distended. No visible stones or sludge. The 



  



 liver and remaining solid abdominal organs including the spleen, 



  



 kidneys, visible portions of the pancreas and adrenal glands are 



  



 otherwise unremarkable. The stomach is mildly distended with contrast 



  



 and fluid. The colon is decompressed and its wall is thickened, 



  



 possibly related to its decompressed state. Scattered colonic 



  



 diverticula are present. Visible portions of the small bowel are 



  



 normal. No evidence for obstruction. No free air or evidence for 



  



 perforation. No abdominal fluid collections. Extensive and bulky 



  



 retroperitoneal adenopathy is present. The largest rey mass 



  



 measures up to 10 x 11 cm and encases both the superior mesenteric 



  



 artery and vein. It also partially effaces or compresses the inferior 



  



 vena cava and the central splenic vein. This retroperitoneal 



  



 adenopathy also causes compression of the distal duodenum, possibly 



  



 accounting for the mildly dilated stomach. Vessels are unremarkable. 



  



 Bones are unremarkable. 



  



  



  



 Pelvic CT: Colonic diverticula are present. The colon is decompressed 



  



 and there are mild inflammatory changes of the surrounding fat in the 



  



 region of the sigmoid colon. Given the diffuse hazy appearance of the 



  



 mesenteric fat, this is unlikely to represent a focal inflammatory 



  



 process such as diverticulitis. Pelvic organs including the rectum, 



  



 prostate gland, urinary bladder and other visible portions of the 



  



 bowel are unremarkable. The appendix is not definitively identified 



  



 and may be surgically absent. No indirect signs for acute 



  



 appendicitis. No pelvic adenopathy is seen. No pelvic fluid 



  



 collections. Vessels and bones are unremarkable.



  



  



  



 IMPRESSION:



  



  



  



 1. Extensive and bulky mediastinal and retroperitoneal adenopathy as 



  



 described above, most consistent with lymphoma.



  



  



  



 2. Scattered colonic diverticula without definitive evidence for 



  



 acute diverticulitis. Extensive stranding of the mesenteric fat is 



  



 seen, possibly representing edema.



  



  



  



 3. Mildly distended gallbladder and mild intrahepatic biliary ductal 



  



 dilatation.



  



  



  



 4. Focal opacity in the right lower lobe, likely atelectasis.



  



  



  



 5. Mildly distended stomach and partially effaced and possibly 



  



 obstructed distal duodenum.



  



  



  



 Signed: Renetta Andre MD



  



 Report Verified Date/Time:2019 21:26:56



  



  



  



 Reading Location: [facility] Reading Room - [place] [address]



  



 Electronically signed by: RENETTA ANDRE M.D. on 2019  



 09:26 PM



  



   









 Procedure Note

 

 Interface, External Ris In - 2019  9:29 PM CST



FINAL REPORT



 



 PATIENT ID:   [id number]



 



 History: Adenopathy, suspected lymphoma.



 



 TECHNIQUE:



 



 Helical CT of the wrist, abdomen and pelvis were performed following



 the uneventful administration of intravenous and positive oral



 contrast utilizing multiple windows, sagittal and coronal



 reformations.



 



 This exam was performed according to our departmental diverticulitis.



 Optimization program which includes automated exposure control,



 adjustment of the mA and/or KV according to the patient's size and/or



 use of iterative reconstruction technique.



 



 FINDINGS: No comparisons are available.



 



 Chest CT: Extensive mediastinal adenopathy is present, the largest



 nodes in the subcarinal and pretracheal spaces. Enlarged left



 supraclavicular lymph nodes are also identified. The heart,



 mediastinum and great vessels are otherwise unremarkable. Thyroid



 gland is unremarkable. Central airways are patent. There is slight



 increased opacity in the right lower lobe, possibly atelectasis



 and/or lung scarring. Lungs are otherwise clear. No suspicious



 pulmonary nodules or masses. Bones are unremarkable.



 



 Abdominal CT: Mild intrahepatic biliary ductal dilatation is present.



 The gallbladder is mildly distended. No visible stones or sludge. The



 liver and remaining solid abdominal organs including the spleen,



 kidneys, visible portions of the pancreas and adrenal glands are



 otherwise unremarkable. The stomach is mildly distended with contrast



 and fluid. The colon is decompressed and its wall is thickened,



 possibly related to its decompressed state. Scattered colonic



 diverticula are present. Visible portions of the small bowel are



 normal. No evidence for obstruction. No free air or evidence for



 perforation. No abdominal fluid collections. Extensive and bulky



 retroperitoneal adenopathy is present. The largest rey mass



 measures up to 10 x 11 cm and encases both the superior mesenteric



 artery and vein. It also partially effaces or compresses the inferior



 vena cava and the central splenic vein. This retroperitoneal



 adenopathy also causes compression of the distal duodenum, possibly



 accounting for the mildly dilated stomach. Vessels are unremarkable.



 Bones are unremarkable.



 



 Pelvic CT: Colonic diverticula are present. The colon is decompressed



 and there are mild inflammatory changes of the surrounding fat in the



 region of the sigmoid colon. Given the diffuse hazy appearance of the



 mesenteric fat, this is unlikely to represent a focal inflammatory



 process such as diverticulitis. Pelvic organs including the rectum,



 prostate gland, urinary bladder and other visible portions of the



 bowel are unremarkable. The appendix is not definitively identified



 and may be surgically absent. No indirect signs for acute



 appendicitis. No pelvic adenopathy is seen. No pelvic fluid



 collections. Vessels and bones are unremarkable.



 



 IMPRESSION:



 



 1. Extensive and bulky mediastinal and retroperitoneal adenopathy as



 described above, most consistent with lymphoma.



 



 2. Scattered colonic diverticula without definitive evidence for



 acute diverticulitis. Extensive stranding of the mesenteric fat is



 seen, possibly representing edema.



 



 3. Mildly distended gallbladder and mild intrahepatic biliary ductal



 dilatation.



 



 4. Focal opacity in the right lower lobe, likely atelectasis.



 



 5. Mildly distended stomach and partially effaced and possibly



 obstructed distal duodenum.



 



 Signed: Renetta Andre MD



 Report Verified Date/Time:  2019 21:26:56



 



 Reading Location: [facility] Reading Room - [place] [address]



       Electronically signed by: RENETTA ANDRE M.D. on 2019 09:26 PM



 









 Performing Organization  Address  City/State/Zipcode  Phone Number

 

 [facility] [place]      



CT chest with IV contrast (2019  8:10 PM CST)





 Narrative  Performed At

 

 FINAL REPORT



  [facility]



  



  



 PATIENT ID: [id number]



  



  



  



 History: Adenopathy, suspected lymphoma.



  



  



  



 TECHNIQUE:



  



  



  



 Helical CT of the wrist, abdomen and pelvis were performed following 



  



 the uneventful administration of intravenous and positive oral 



  



 contrast utilizing multiple windows, sagittal and coronal 



  



 reformations.



  



  



  



 This exam was performed according to our departmental diverticulitis. 



  



 Optimization program which includes automated exposure control, 



  



 adjustment of the mA and/or KV according to the patient's size and/or 



  



 use of iterative reconstruction technique.



  



  



  



 FINDINGS: No comparisons are available.



  



  



  



 Chest CT: Extensive mediastinal adenopathy is present, the largest 



  



 nodes in the subcarinal and pretracheal spaces. Enlarged left 



  



 supraclavicular lymph nodes are also identified. The heart, 



  



 mediastinum and great vessels are otherwise unremarkable. Thyroid 



  



 gland is unremarkable. Central airways are patent. There is slight 



  



 increased opacity in the right lower lobe, possibly atelectasis 



  



 and/or lung scarring. Lungs are otherwise clear. No suspicious 



  



 pulmonary nodules or masses. Bones are unremarkable.



  



  



  



 Abdominal CT: Mild intrahepatic biliary ductal dilatation is present. 



  



 The gallbladder is mildly distended. No visible stones or sludge. The 



  



 liver and remaining solid abdominal organs including the spleen, 



  



 kidneys, visible portions of the pancreas and adrenal glands are 



  



 otherwise unremarkable. The stomach is mildly distended with contrast 



  



 and fluid. The colon is decompressed and its wall is thickened, 



  



 possibly related to its decompressed state. Scattered colonic 



  



 diverticula are present. Visible portions of the small bowel are 



  



 normal. No evidence for obstruction. No free air or evidence for 



  



 perforation. No abdominal fluid collections. Extensive and bulky 



  



 retroperitoneal adenopathy is present. The largest rey mass 



  



 measures up to 10 x 11 cm and encases both the superior mesenteric 



  



 artery and vein. It also partially effaces or compresses the inferior 



  



 vena cava and the central splenic vein. This retroperitoneal 



  



 adenopathy also causes compression of the distal duodenum, possibly 



  



 accounting for the mildly dilated stomach. Vessels are unremarkable. 



  



 Bones are unremarkable. 



  



  



  



 Pelvic CT: Colonic diverticula are present. The colon is decompressed 



  



 and there are mild inflammatory changes of the surrounding fat in the 



  



 region of the sigmoid colon. Given the diffuse hazy appearance of the 



  



 mesenteric fat, this is unlikely to represent a focal inflammatory 



  



 process such as diverticulitis. Pelvic organs including the rectum, 



  



 prostate gland, urinary bladder and other visible portions of the 



  



 bowel are unremarkable. The appendix is not definitively identified 



  



 and may be surgically absent. No indirect signs for acute 



  



 appendicitis. No pelvic adenopathy is seen. No pelvic fluid 



  



 collections. Vessels and bones are unremarkable.



  



  



  



 IMPRESSION:



  



  



  



 1. Extensive and bulky mediastinal and retroperitoneal adenopathy as 



  



 described above, most consistent with lymphoma.



  



  



  



 2. Scattered colonic diverticula without definitive evidence for 



  



 acute diverticulitis. Extensive stranding of the mesenteric fat is 



  



 seen, possibly representing edema.



  



  



  



 3. Mildly distended gallbladder and mild intrahepatic biliary ductal 



  



 dilatation.



  



  



  



 4. Focal opacity in the right lower lobe, likely atelectasis.



  



  



  



 5. Mildly distended stomach and partially effaced and possibly 



  



 obstructed distal duodenum.



  



  



  



 Signed: Renetta Andre MD



  



 Report Verified Date/Time:2019 21:26:56



  



  



  



 Reading Location: [facility] Reading Room - [place] [address]



  



 Electronically signed by: RENETTA ANDRE M.D. on 2019  



 09:26 PM



  



   









 Procedure Note

 

 Interface, External Ris In - 2019  9:29 PM CST



FINAL REPORT



 



 PATIENT ID:   [id number]



 



 History: Adenopathy, suspected lymphoma.



 



 TECHNIQUE:



 



 Helical CT of the wrist, abdomen and pelvis were performed following



 the uneventful administration of intravenous and positive oral



 contrast utilizing multiple windows, sagittal and coronal



 reformations.



 



 This exam was performed according to our departmental diverticulitis.



 Optimization program which includes automated exposure control,



 adjustment of the mA and/or KV according to the patient's size and/or



 use of iterative reconstruction technique.



 



 FINDINGS: No comparisons are available.



 



 Chest CT: Extensive mediastinal adenopathy is present, the largest



 nodes in the subcarinal and pretracheal spaces. Enlarged left



 supraclavicular lymph nodes are also identified. The heart,



 mediastinum and great vessels are otherwise unremarkable. Thyroid



 gland is unremarkable. Central airways are patent. There is slight



 increased opacity in the right lower lobe, possibly atelectasis



 and/or lung scarring. Lungs are otherwise clear. No suspicious



 pulmonary nodules or masses. Bones are unremarkable.



 



 Abdominal CT: Mild intrahepatic biliary ductal dilatation is present.



 The gallbladder is mildly distended. No visible stones or sludge. The



 liver and remaining solid abdominal organs including the spleen,



 kidneys, visible portions of the pancreas and adrenal glands are



 otherwise unremarkable. The stomach is mildly distended with contrast



 and fluid. The colon is decompressed and its wall is thickened,



 possibly related to its decompressed state. Scattered colonic



 diverticula are present. Visible portions of the small bowel are



 normal. No evidence for obstruction. No free air or evidence for



 perforation. No abdominal fluid collections. Extensive and bulky



 retroperitoneal adenopathy is present. The largest rey mass



 measures up to 10 x 11 cm and encases both the superior mesenteric



 artery and vein. It also partially effaces or compresses the inferior



 vena cava and the central splenic vein. This retroperitoneal



 adenopathy also causes compression of the distal duodenum, possibly



 accounting for the mildly dilated stomach. Vessels are unremarkable.



 Bones are unremarkable.



 



 Pelvic CT: Colonic diverticula are present. The colon is decompressed



 and there are mild inflammatory changes of the surrounding fat in the



 region of the sigmoid colon. Given the diffuse hazy appearance of the



 mesenteric fat, this is unlikely to represent a focal inflammatory



 process such as diverticulitis. Pelvic organs including the rectum,



 prostate gland, urinary bladder and other visible portions of the



 bowel are unremarkable. The appendix is not definitively identified



 and may be surgically absent. No indirect signs for acute



 appendicitis. No pelvic adenopathy is seen. No pelvic fluid



 collections. Vessels and bones are unremarkable.



 



 IMPRESSION:



 



 1. Extensive and bulky mediastinal and retroperitoneal adenopathy as



 described above, most consistent with lymphoma.



 



 2. Scattered colonic diverticula without definitive evidence for



 acute diverticulitis. Extensive stranding of the mesenteric fat is



 seen, possibly representing edema.



 



 3. Mildly distended gallbladder and mild intrahepatic biliary ductal



 dilatation.



 



 4. Focal opacity in the right lower lobe, likely atelectasis.



 



 5. Mildly distended stomach and partially effaced and possibly



 obstructed distal duodenum.



 



 Signed: Renetta Andre MD



 Report Verified Date/Time:  2019 21:26:56



 



 Reading Location: [facility] Reading Room - [place] [address]



       Electronically signed by: RENETTA ANDRE M.D. on 2019 09:26 PM



 









 Performing Organization  Address  City/State/Zipcode  Phone Number

 

 [facility]      



Hepatitis B Panel (2019  5:41 PM CST)





 Component  Value  Ref Range  Performed At

 

 Hep B Core Total Ab  REACTIVE (A)  Nonreactive  [facility]

 

 Hep B S Ab  735.9 (H)  <8.0 mIU/mL  [facility]

 

 hepatitis B Surface Ag  NON-REACTIVE  Nonreactive  [facility]









 Specimen

 

 Blood - Arm, Right









 Performing Organization  Address  City/[facility]/Zipcode  Phone Number

 

 [facility]  [address] [place] [address] [phone]
[phone]



 [facility]      



Hepatitis C antibody (2019  5:41 PM CST)





 Component  Value  Ref Range  Performed At

 

 Hepatitis C Ab  NON-REACTIVE  Nonreactive  [facility]









 Specimen

 

 Blood - Arm, Right









 Performing Organization  Address  City/[facility]/Zipcode  Phone Number

 

 [address] [address] [phone]
[phone]



 [facility]      



Urinalysis w/Microscopic (2019  4:06 PM CST)





 Component  Value  Ref Range  Performed At

 

 Color, UA  Dark Yellow    [facility]

 

 Clarity, UA  Clear    [facility]

 

 Specific [place], UA  1.026  1.001 - 1.035  [facility]

 

 pH, UA  6.5  5.0 - 8.0  [facility]

 

 Protein, UA  30 mg/dL (A)  Negative  [facility]

 

 Glucose, UA  Negative  Negative  [facility]

 

 Ketones, UA  >150 mg/dL (A)  Negative  [facility]

 

 Bilirubin, UA  Positive (A)  Negative  [facility]

 

 Blood, UA  Negative  Negative  [facility]

 

 Nitrite, UA  Negative  Negative  [facility]

 

 Leukocytes, UA  Negative  Negative  [facility]

 

 Urobilinogen, UA  2.0 (H)  0.2 - 1.0 mg/dL  [facility]

 

 RBC, UA  1  /HPF  [facility]

 

 WBC, UA  1  /HPF  [facility]

 

 Mucus  Occasional    [facility]

 

 Squam Epithel, UA  <1  /HPF  [facility]

 

 Specimen Source      [facility]









 Specimen

 

 Urine









 Performing Organization  Address  City/State/Zipcode  Phone Number

 

 [facility]  [address] [place] [address]  [phone]
[phone]



 CENTER      



Pulmonary Funct Lab Spirometry (2019  2:41 PM CST)





 Narrative  Performed At

 

 Pilo Walsh RRT, RCP 20193:10 PM



  



 [facility] PFT CHARTING REPORT



  



  



  



 Infection Control/Hand Hygiene procedures followed throughout the 



  



 encounter with patient: Yes



  



 Patient Identification Method: Patient name verified on armband, 



  



 and Medical record on armband,



  



  



  



 Is the order complete?: Yes



  



  



  



 Medical Record #: [id number] Account ID#:  



 [id number]



  



 Patient Name: Car Sanchez



  



 YOB: 1960 Age: 58  



 y.o.Sex: 



  



 male 



  



 Admission Date: 2019Patient Status: Inpatient



  



  



  



 Reasons/Symptom for having the Test?: post transplant protocol 



  



  



  



 Type of study/treatment ordered by physician: Spirometry 



  



  



  



 Lab Results 



  



 Component Value Date 



  



  HGB 13.3 (L) 2019 



  



  



  



  



  



 Ranges: Adult Male 13 - 16.8 g/dlAdult Female 12 -  



 15 



  



 g/dl



  



  



  



 6 Minute Walk (read only) 2019 



  



 Pulse 94 - 101 



  



 SpO2 94 93 97 



  



  



  



  



  



 Study Date: 2019Study Time: 1441 



  



  



  



  



  



 ASSESSMENT



  



  History & Physical



  



  



  



  Mode of Arrival: Ambulatory 



  



  



  



  Pulse: 100Resp:  



 18SPO2: 97 



  



 % RA



  



 



  



  Pain Assessment



  



  Pain:None



  



  



  



 TESTING/THERAPEUTICS



  



  Medications ordered or required for procedure: N/A



  



  



  



 PT EDUCATION/INSTRUCTIONS



  



  Barriers to learning: No known barriers to learning. 



  



  Learning need identified: Yes, Patient/Family/Guradian was 



  



 informed of the ordered study by the physician



  



  Barriers to performing study or treatment: Patient has no known 



  



 disability to perform the study or treatment.



  



  



  



 DISCHARGE



  



  



  



 The study was completed in accordance with the physician's order 



  



 and patient released from the lab without adverse outcome.



  



   



HIV-1 Antigen with HIV-1/2 Antibody (2019  6:06 AM CST)





 Component  Value  Ref Range  Performed At

 

 HIV-1 Antigen with HIV 1&2  NON-REACTIVE  Nonreactive  [facility]



 Antibody      MEDICAL CENTER









 Specimen

 

 Blood - Arm, Right









 Performing Organization  Address  City/State/Zipcode  Phone Number

 

 [facility]  [address] [place] [address] [phone]
[phone]



 CENTER      



Beta 2 microglobulin (2019  6:06 AM CST)





 Component  Value  Ref Range  Performed At

 

 B2 Microglobulin, Serum  2.51  < OR=2.51 mg/L  [facility]









 Specimen

 

 Blood - Arm, Right









 Narrative  Performed At

 

 Performing Lab



  [facility]



  [place]



  



  [facility] [address] [address]



  



  ОЛЬГА Damian MD, PhD, DEAN  









 Performing Organization  Address  City/State[facility]  Phone Number

 

 [place] [address]  



 [facility]  [id number] [facility]    



US abdomen complete (2019  2:25 AM CST)





 Narrative  Performed At

 

 FINAL REPORT



  [facility]



  



  



 PATIENT ID: [id number]



  



  



  



 INDICATION: assess biliary system



  



  



  



 COMPARISON: None.



  



  



  



 TECHNIQUE:Real-time transabdominal gray scale and color Doppler 



  



 ultrasound of the abdomen. 



  



  



  



 FINDINGS:



  



 Liver: 



  



  Size: 14.2cm. 



  



  Echogenicity: Somewhat heterogeneous hepatic echotexture.



  



  Masses/lesions: There is a 2.6 x 2.8 x 2.5 cm hypoechoic nodule  



 



  



 in the posterior aspect of the right liver targetoid appearance.



  



  Surface Nodularity: None.



  



  Intrahepatic bile ducts: Mild intrahepatic biliary ductal 



  



 dilatation.



  



  Common bile duct: 0.6 cm.



  



  MPV: 1.1cm.



  



  



  



 Gallbladder: 



  



  Stones: None.



  



  Sludge: None.



  



  Wall thickness: 0.3 cm. The gallbladder lumen is nondistended.



  



  Pericholecystic fluid: None.



  



  Sonographic Ross's sign: No sonographic Ross's sign.



  



  



  



 Pancreas: 



  



  Head and uncinate process: Not well seen however there are 



  



 multiple hypoechoic masses in the peripancreatic region the largest 



  



 of which is posterior to the pancreatic tail measuring 11.2 x 8.8 x 



  



 12.9 cm.



  



  Body and tail: Not well-seen.



  



  



  



 Spleen:



  



  Size: 10.4cm.



  



  Echogenicity: Unremarkable.



  



  



  



 Right kidney: 



  



  Size: 10.2 x 4.4 x 5.4 cm.



  



  Parenchyma: Normal echogenicity. No cysts. No stones.



  



  Hydronephrosis: None.



  



  



  



 Left kidney:



  



  Size: 11.6 x 6.0 x 6.1 cm.



  



  Parenchyma: Normal echogenicity. No cysts. No stones.



  



  Hydronephrosis: None.



  



  



  



 Ascites: None.



  



  



  



 Regional Vasculature: The visible abdominal aorta, IVC and hepatic 



  



 veins are patent. The aorta measures 2.0 cm proximally, 1.8 cm in the 



  



 midportion 1.6 cm distally.



  



  



  



 Additional findings: Multiple hypoechoic nodules in the beverly hepatis 



  



 region the largest of which measures 2.5 x 2.2 x 2.4 cm and represent 



  



 peripancreatic lymph nodes..



  



  



  



  



  



 IMPRESSION: 



  



  



  



 Multiple hypoechoic peripancreatic masses the largest of which 



  



 measures up to 11.1 cm as above with multiple abnormal beverly hepatis 



  



 lymph nodes. Additionally there is intrahepatic biliary ductal 



  



 dilatation and a hypoechoic lesion in the right posterior liver 



  



 concerning for metastatic disease. Recommend further evaluation with 



  



 pancreatic protocol cross-sectional imaging.



  



  



  



 Signed: Alethea Perez MD



  



 Report Verified Date/Time:2019 04:08:06



  



  



  



 Reading Location: [address] Transitional Reading Room



  



 Electronically signed by: ALETHEA PEREZ MD on 2019  



 04:08 AM



  



   









 Procedure Note

 

 Interface, External Ris In - 2019  4:10 AM CST



FINAL REPORT



 



 PATIENT ID:   [id number]



 



 INDICATION: assess biliary system



 



 COMPARISON: None.



 



 TECHNIQUE:  Real-time transabdominal gray scale and color Doppler



 ultrasound of the abdomen.



 



 FINDINGS:



 Liver:



      Size: 14.2cm.



      Echogenicity: Somewhat heterogeneous hepatic echotexture.



      Masses/lesions: There is a 2.6 x 2.8 x 2.5 cm hypoechoic nodule



 in the posterior aspect of the right liver targetoid appearance.



      Surface Nodularity: None.



      Intrahepatic bile ducts: Mild intrahepatic biliary ductal



 dilatation.



      Common bile duct: 0.6 cm.



      MPV: 1.1cm.



 



 Gallbladder:



      Stones: None.



      Sludge: None.



      Wall thickness: 0.3 cm. The gallbladder lumen is nondistended.



      Pericholecystic fluid: None.



      Sonographic Ross's sign: No sonographic Ross's sign.



 



 Pancreas:



      Head and uncinate process: Not well seen however there are



 multiple hypoechoic masses in the peripancreatic region the largest



 of which is posterior to the pancreatic tail measuring 11.2 x 8.8 x



 12.9 cm.



      Body and tail: Not well-seen.



 



 Spleen:



      Size: 10.4cm.



      Echogenicity: Unremarkable.



 



 Right kidney:



      Size: 10.2 x 4.4 x 5.4 cm.



      Parenchyma: Normal echogenicity. No cysts. No stones.



      Hydronephrosis: None.



 



 Left kidney:



      Size: 11.6 x 6.0 x 6.1 cm.



      Parenchyma: Normal echogenicity. No cysts. No stones.



      Hydronephrosis: None.



 



 Ascites: None.



 



 Regional Vasculature: The visible abdominal aorta, IVC and hepatic



 veins are patent. The aorta measures 2.0 cm proximally, 1.8 cm in the



 midportion 1.6 cm distally.



 



 Additional findings: Multiple hypoechoic nodules in the beverly hepatis



 region the largest of which measures 2.5 x 2.2 x 2.4 cm and represent



 peripancreatic lymph nodes..



 



 



 IMPRESSION:



 



 Multiple hypoechoic peripancreatic masses the largest of which



 measures up to 11.1 cm as above with multiple abnormal beverly hepatis



 lymph nodes. Additionally there is intrahepatic biliary ductal



 dilatation and a hypoechoic lesion in the right posterior liver



 concerning for metastatic disease. Recommend further evaluation with



 pancreatic protocol cross-sectional imaging.



 



 Signed: Alethea Perez MD



 Report Verified Date/Time:  2019 04:08:06



 



 Reading Location: [address] Transitional Reading Room



   Electronically signed by: ALETHEA PEREZ MD on 2019 04:08 AM



 









 Performing Organization  Address  City/State/Zipcode  Phone Number

 

 [facility]      



after 2018



Insurance







 Payer  Benefit Plan / Group  Subscriber ID  Type  Phone  Address

 

 [facility]  [employer]  xxxxxxxxx  HMO/POS    



 CARE  CHOICE        









 Guarantor Name  Account Type  Relation to  Date of  Phone  Billing Address



     Patient  Birth    

 

 Car Sanchez  Personal/Famil  Self  1960  [phone]  [address]  [place]      (Home)  [place] [address]







Advance Directives

For more information, please contact:[address] 
[facility] [place] [phone]





 Code Status  Date Activated  Date Inactivated  Comments

 

 Full Code  4/3/2019 11:54 AM  2019  2:53 PM  









 This code status was determined by:  Patient  









       

 

 Full Code  2019 12:43 AM  3/14/2019  7:26 PM  









 This code status was determined by:  Patient

## 2019-04-14 NOTE — RAD REPORT
EXAM DESCRIPTION:  CT - Thorax W/ Con - 4/14/2019 8:47 pm

 

CLINICAL HISTORY:  Fever, productive cough, history of lymphoma with ongoing chemotherapy

 

COMPARISON:  Chest films same date

 

TECHNIQUE:  Dynamically enhanced 5 mm thick images of the chest were obtained during administration o
f 100 mL non-ionic IV contrast.

 

All CT scans are performed using dose optimization technique as appropriate and may include automated
 exposure control or mA/KV adjustment according to patient size.

 

FINDINGS:  Left lung field is clear. No left-sided pleural effusion, pleural thickening or mass. Righ
t hemidiaphragm elevation is noted. Moderately large right pleural effusion is present. This is proba
margareth loculated. Patchy parenchymal opacification is present in the right base most likely atelectasis.
 No endobronchial lesion.

 

 No pneumothorax. No chest wall mass or abnormal axillary lymphadenopathy.  No pericardial thickening
 or effusion.

 

No abnormal mediastinal or hilar mass or lymphadenopathy seen.

 

Limited upper abdomen imaging shows percutaneous biliary drainage. Pancreatic stent is also in place.


 

IMPRESSION:  Moderately large loculated right pleural effusion.

 

Right lung field parenchymal stranding is most likely atelectasis and scarring change. Acute pneumoni
a is not entirely excluded but felt to be on likely. Left hemithorax is clear.

## 2019-04-15 LAB
ALBUMIN SERPL BCP-MCNC: 2.1 G/DL (ref 3.4–5)
ALP SERPL-CCNC: 85 U/L (ref 45–117)
ALT SERPL W P-5'-P-CCNC: 39 U/L (ref 12–78)
AST SERPL W P-5'-P-CCNC: 21 U/L (ref 15–37)
BLD SMEAR INTERP: (no result)
BUN BLD-MCNC: 12 MG/DL (ref 7–18)
GLUCOSE SERPLBLD-MCNC: 91 MG/DL (ref 74–106)
HCT VFR BLD CALC: 26.4 % (ref 39.6–49)
HDLC SERPL-MCNC: 30 MG/DL (ref 40–60)
INR BLD: 1.28
LDLC SERPL CALC-MCNC: 89 MG/DL (ref ?–130)
LYMPHOCYTES # SPEC AUTO: 0.6 K/UL (ref 0.7–4.9)
MAGNESIUM SERPL-MCNC: 1.7 MG/DL (ref 1.8–2.4)
MORPHOLOGY BLD-IMP: (no result)
PMV BLD: 7.5 FL (ref 7.6–11.3)
POTASSIUM SERPL-SCNC: 3.4 MMOL/L (ref 3.5–5.1)
RBC # BLD: 3.19 M/UL (ref 4.33–5.43)
UA COMPLETE W REFLEX CULTURE PNL UR: (no result)

## 2019-04-15 PROCEDURE — 0W993ZX DRAINAGE OF RIGHT PLEURAL CAVITY, PERCUTANEOUS APPROACH, DIAGNOSTIC: ICD-10-PCS

## 2019-04-15 RX ADMIN — IPRATROPIUM BROMIDE SCH MG: 0.5 SOLUTION RESPIRATORY (INHALATION) at 11:05

## 2019-04-15 RX ADMIN — HYDROCODONE POLISTIREX AND CHLORPHENIRAMINE POLISTIREX PRN ML: 10; 8 SUSPENSION, EXTENDED RELEASE ORAL at 18:38

## 2019-04-15 RX ADMIN — PIPERACILLIN SODIUM AND TAZOBACTAM SODIUM SCH MLS: 3; .375 INJECTION, POWDER, LYOPHILIZED, FOR SOLUTION INTRAVENOUS at 16:26

## 2019-04-15 RX ADMIN — SODIUM CHLORIDE SCH: 0.9 INJECTION, SOLUTION INTRAVENOUS at 07:00

## 2019-04-15 RX ADMIN — ALBUTEROL SULFATE SCH MG: 2.5 SOLUTION RESPIRATORY (INHALATION) at 11:05

## 2019-04-15 RX ADMIN — ACYCLOVIR SCH MG: 400 TABLET ORAL at 20:37

## 2019-04-15 RX ADMIN — ALBUTEROL SULFATE SCH MG: 2.5 SOLUTION RESPIRATORY (INHALATION) at 20:00

## 2019-04-15 RX ADMIN — HYDROCODONE POLISTIREX AND CHLORPHENIRAMINE POLISTIREX PRN ML: 10; 8 SUSPENSION, EXTENDED RELEASE ORAL at 03:07

## 2019-04-15 RX ADMIN — ALBUTEROL SULFATE SCH MG: 2.5 SOLUTION RESPIRATORY (INHALATION) at 02:46

## 2019-04-15 RX ADMIN — Medication SCH ML: at 10:14

## 2019-04-15 RX ADMIN — SODIUM CHLORIDE SCH: 0.9 INJECTION, SOLUTION INTRAVENOUS at 15:00

## 2019-04-15 RX ADMIN — SODIUM CHLORIDE SCH: 0.45 INJECTION, SOLUTION INTRAVENOUS at 16:00

## 2019-04-15 RX ADMIN — TBO-FILGRASTIM SCH MCG: 300 INJECTION, SOLUTION SUBCUTANEOUS at 10:14

## 2019-04-15 RX ADMIN — PIPERACILLIN SODIUM AND TAZOBACTAM SODIUM SCH MLS: 3; .375 INJECTION, POWDER, LYOPHILIZED, FOR SOLUTION INTRAVENOUS at 00:00

## 2019-04-15 RX ADMIN — ACETAMINOPHEN PRN MG: 500 TABLET, FILM COATED ORAL at 18:29

## 2019-04-15 RX ADMIN — SODIUM CHLORIDE SCH MLS: 9 INJECTION, SOLUTION INTRAVENOUS at 12:47

## 2019-04-15 RX ADMIN — Medication SCH ML: at 20:37

## 2019-04-15 RX ADMIN — PIPERACILLIN SODIUM AND TAZOBACTAM SODIUM SCH MLS: 3; .375 INJECTION, POWDER, LYOPHILIZED, FOR SOLUTION INTRAVENOUS at 06:17

## 2019-04-15 RX ADMIN — ENOXAPARIN SODIUM SCH MG: 30 INJECTION SUBCUTANEOUS at 16:27

## 2019-04-15 RX ADMIN — IPRATROPIUM BROMIDE SCH MG: 0.5 SOLUTION RESPIRATORY (INHALATION) at 15:10

## 2019-04-15 RX ADMIN — ALBUTEROL SULFATE SCH MG: 2.5 SOLUTION RESPIRATORY (INHALATION) at 15:10

## 2019-04-15 RX ADMIN — IPRATROPIUM BROMIDE SCH MG: 0.5 SOLUTION RESPIRATORY (INHALATION) at 02:45

## 2019-04-15 RX ADMIN — IPRATROPIUM BROMIDE SCH MG: 0.5 SOLUTION RESPIRATORY (INHALATION) at 20:00

## 2019-04-15 NOTE — RAD REPORT
EXAM DESCRIPTION:  US - Chest - 4/15/2019 8:35 am

 

CLINICAL HISTORY:  Evalauate for thoracentesis and locualtions

Right pleural effusion, chest pain

 

COMPARISON:  Chest Lateral Decubitus dated 4/15/2019

 

FINDINGS:  Posterior right chest wall sonography was performed to assess size and loculation of the r
ight pleural effusion. A moderate right pleural effusion is seen. The loculations detected are minima
l.

## 2019-04-15 NOTE — RAD REPORT
EXAM DESCRIPTION:  RAD - Chest Single View - 4/15/2019 1:23 pm

 

CLINICAL HISTORY:  rule out pneumothorax, post thoracentesis

Chest pain.

 

COMPARISON:  Chest Single View dated 4/14/2019; Chest Single View dated 2/26/2019

 

FINDINGS:  Portable technique limits examination quality.

 

Right pleural effusion is again noted, mildly decreased in size since comparative study. No pneumotho
rax evident. The heart is normal in size. Right-sided port catheter tip in the SVC.

## 2019-04-15 NOTE — P.CNS
Date of Consult: 04/15/19


Chief Complaint:  SHORTNESS OF BREATH; HYPERGLYCEMIA pleural effusion


History of Present Illness: 





Patient is 58 years of age A is been treated with lymphoma last chemotherapy 

was at [facility] on Monday patient has a history of right-sided 

pleural effusion has undergone thoracentesis before this admission was 

precipitated by a 3 day history of worsening cough shortness of breath fever 

apparently he was scheduled to have a thoracentesis done at [facility] on the 24th of this month then no other cardiopulmonary problems apart 

from a hypertension patient is neutropenia from his chemotherapy discussed with 

the patient


Allergies





No Known Allergies Allergy (Unverified 04/14/19 23:33)


 





Home Medications: 








Acyclovir [Zovirax] 400 mg PO BID 04/14/19 


Allopurinol [Zyloprim*] 300 mg PO DAILY 04/14/19 


Amlodipine [Norvasc*] 5 mg PO DAILY 04/14/19 


Ciprofloxacin/Ciprofloxa HCl [Ciprofloxacin 500 mg ER Tablet] 500 mg PO DAILY 04 /14/19 


Entecavir [Baraclude] 0.5 mg PO DAILY 04/14/19 


Folic Acid 1 mg PO DAILY 04/14/19 


Ondansetron HCl [Zofran] 4 mg PO PRN 04/14/19 


Pantoprazole [Protonix Tab*] 40 mg PO DAILY 04/14/19 








- Past Medical/Surgical History


Diabetic: No


-:   PLEURAL EFFUSION


-:  LYMPHOMA with a percutaneous drain in the hepatic region


-:  HYPERTENSION


-:  GOUTY ARTHROPATHY


-: port-a-cath (chemo)





- Family History


  ** Father


Family History: Reviewed- Non-Contributory





- Social History


Alcohol use: No


CD- Drugs: No


Caffeine use: Yes


Place of Residence: Home





Physical Examination














Temp Pulse Resp BP Pulse Ox


 


 98.3 F   86   18   96/69   98 


 


 04/15/19 05:43  04/15/19 05:43  04/15/19 05:43  04/15/19 05:43  04/15/19 05:43








General: Alert, In no apparent distress


HEENT: Atraumatic


Neck: Supple


Respiratory: Diminished (Diminished air entry on the right side)


Cardiovascular: No edema, Regular rate/rhythm


Gastrointestinal: Normal bowel sounds, Soft and benign, Other (Patient has a 

percutaneous catheter)


Laboratory Data (last 24 hrs)





04/14/19 19:05: PT 15.0 H, INR 1.28, APTT 28.3


04/14/19 19:05: WBC 1.6 L*, Hgb 10.9 L, Hct 32.3 L, Plt Count 194


04/14/19 19:05: Sodium 136, Potassium 3.6, BUN 17, Creatinine 0.86, Glucose 104

, Total Bilirubin 0.6, AST 24, ALT 50, Alkaline Phosphatase 108, Lipase 117








- Problems


(1) Pleural effusion


Current Visit: Yes   Status: Acute   


Plan: 


Patient is 58 years of age diagnosed with lymphoma involving the liver has a 

percutaneous drain on chemotherapy admitted with a slight fever shortness of 

breath he does have a significant effusion on the right side patient evaluated 

at [facility] in [place] was scheduled to have a thoracentesis done 

and recent chemotherapy as scheduled him for a thoracentesis probably 

discharged after that I strongly suspect that this is induced by he has 

lymphoma low white count and hemoglobin is probably from his chemo vital signs 

all reviewed possible discharge tomorrow

## 2019-04-15 NOTE — EKG
Test Date:    2019-04-14               Test Time:    18:54:02

Technician:   MITCHELL                                    

                                                     

MEASUREMENT RESULTS:                                       

Intervals:                                           

Rate:         116                                    

NJ:           124                                    

QRSD:         66                                     

QT:           280                                    

QTc:          389                                    

Axis:                                                

P:            51                                     

NJ:           124                                    

QRS:          46                                     

T:            -14                                    

                                                     

INTERPRETIVE STATEMENTS:                                       

                                                     

Sinus tachycardia

Nonspecific T wave abnormality

Abnormal ECG

Compared to ECG 02/26/2019 18:25:05

T-wave abnormality now present

Sinus rhythm no longer present



Electronically Signed On 04-15-19 08:15:48 CDT by Garry Santana

## 2019-04-15 NOTE — RAD REPORT
EXAM DESCRIPTION:  RAD - Chest Lateral Decubitus - 4/15/2019 8:22 am

 

CLINICAL HISTORY:  loculated pleural effusion

 

COMPARISON:  Chest Single View dated 4/14/2019

 

FINDINGS:  The loculated right pleural effusion is seen, moderate to large in size. Small amount laye
ring fluid component is seen. The greatest depth of the layering component measures 4 cm.

## 2019-04-15 NOTE — RAD REPORT
EXAM DESCRIPTION:  US - Thoracentesis w/ US Guide - 4/15/2019 9:40 am

 

CLINICAL HISTORY:  Pleural effusion.

pleural effusion

 

COMPARISON:  No comparisons

 

FINDINGS:  Preoperative diagnosis: Right pleural effusion

Post operative diagnosis: Same

Conscious Sedation: None.

Estimated blood loss: Minimal

Specimens:A small volume of fluid was sent for requested lab studies.

 

The patient was placed in the upright recumbent position and the right posterior chest was prepped an
d draped in the usual sterile fashion.  1% Lidocaine was infiltrated into the soft tissues for local 
anesthesia.  Under sonographic guidance, a thoracentesis needle and 6 Danish catheter was advanced in
to the right pleural space. Approximately 1 liter yellow fluid was aspirated. Samples were sent to pa
thology for requested analysis. The patient tolerated the procedure without immediate complication an
d transferred to the floor in stable condition.

 

 

IMPRESSION:  Successful ultrasound-guided thoracentesis as detailed.

## 2019-04-15 NOTE — P.PN
Subjective


Date of Service: 04/15/19


Primary Care Provider: NATI Villegas [facility]


Chief Complaint:  SHORTNESS OF BREATH; HYPERGLYCEMIA pleural effusion


Subjective: Improving





Physical Examination





- Vital Signs


Temperature: 98.7 F


Blood Pressure: 99/55


Pulse: 108


Respirations: 18


Pulse Ox (%): 98





- Physical Exam


General: Alert, In no apparent distress, Oriented x3


HEENT: Atraumatic


Neck: Supple


Respiratory: Crackles/rales (Crackles to the bases but improved aeration after 

thoracentesis)


Cardiovascular: Abnormal pulses (Minimal sinus tachycardia )


Gastrointestinal: Normal bowel sounds, Soft and benign, Non-distended


Neurological: Normal speech, Normal strength at 5/5 x4 extr, Normal tone





- Studies


Laboratory Data (last 24 hrs)





04/14/19 19:05: PT 15.0 H, INR 1.28, APTT 28.3


04/14/19 19:05: WBC 1.6 L*, Hgb 10.9 L, Hct 32.3 L, Plt Count 194


04/14/19 19:05: Sodium 136, Potassium 3.6, BUN 17, Creatinine 0.86, Glucose 104

, Total Bilirubin 0.6, AST 24, ALT 50, Alkaline Phosphatase 108, Lipase 117





Medications List Reviewed: Yes





Assessment & Plan


Discharge Plan: Home


Plan to discharge in: 24 Hours


Physician Review Additional Text: 





Impression:


Large right pleural effusion status post thoracentesis with history of prior 

thoracentesis


Patient with history of lymphoma


Neutropenia with fever


History hypertension





Plan:


Large right pleural effusion status post thoracentesis with history of prior 

thoracentesis:  Patient seen and evaluated by pulmonology.  Pulmonology agrees 

with thoracentesis.  Patient had thoracenteses earlier.  Over a L fluid 

removed.  Will continue to monitor closely.  Will maintain sats above 90%.  

Will reassess tomorrow.  Monitor CBC and BMP.  Recheck chest x-ray in the 

morning.  Anticipate possible discharge as early as tomorrow.


Patient with history of lymphoma:  Patient had recent chemotherapy.  This was 

his 2nd chemotherapy.  Patient to follow up with Oncology in [place].


Neutropenia with fever:  Continue with broad-spectrum IV antibiotic therapy.  

Blood cultures obtained and still pending.  No bacteria noted from fluid 

obtained from thoracentesis.  Patient has remained afebrile.  Will cover with 

his antiviral medication.  Continue with  Granix until neutropenia resolved.  

Continue monitor CBC.


History of hypertension:  Blood pressures remains stable off medication. Will 

continue to monitor off medication.


Time Spent Managing Pts Care (In Minutes): 55

## 2019-04-15 NOTE — P.HP
Certification for Inpatient


Patient admitted to: Inpatient


With expected LOS: >2 Midnights


Patient will require the following post-hospital care: None


Practitioner: I am a practitioner with admitting privileges, knowledge of 

patient current condition, hospital course, and medical plan of care.


Services: Services provided to patient in accordance with Admission 

requirements found in Title 42 Section 412.3 of the Code of Federal Regulations





Patient History


Date of Service: 04/15/19


Reason for admission:  SHORTNESS OF BREATH; HYPERGLYCEMIA


History of Present Illness: 





  PATIENT IS A 58-YEAR-OLD GENTLEMAN CAME TO THE HOSPITAL WITH   SHORTNESS OF 

BREATH.  PATIENT WAS FOUND TO HAVE A SLIGHT FEVER AS WELL AS LEUKOPENIA.  

PATIENT'S SHORTNESS OF BREATH WAS CAUSED BY A PLEURAL EFFUSION ON THE RIGHT 

SIDE.  THIS COULD BE LOCULATED.  IT IS NOT FREELY MOBILE.  WILL GET RADIOLOGY 

INPUT AT THIS TIME.  THEN WE WILL ALSO GET INTERVENTIONAL RADIOLOGY FOR 

THORACENTESIS & PULMONARY CONSULTATION AS WELL.


Allergies





No Known Allergies Allergy (Unverified 04/14/19 23:33)


 





Home Medications: 








Acyclovir [Zovirax] 400 mg PO BID 04/14/19 


Allopurinol [Zyloprim*] 300 mg PO DAILY 04/14/19 


Amlodipine [Norvasc*] 5 mg PO DAILY 04/14/19 


Ciprofloxacin/Ciprofloxa HCl [Ciprofloxacin 500 mg ER Tablet] 500 mg PO DAILY 04 /14/19 


Entecavir [Baraclude] 0.5 mg PO DAILY 04/14/19 


Folic Acid 1 mg PO DAILY 04/14/19 


Ondansetron HCl [Zofran] 4 mg PO PRN 04/14/19 


Pantoprazole [Protonix Tab*] 40 mg PO DAILY 04/14/19 








- Past Medical/Surgical History


Has patient received pneumonia vaccine in the past: No


Diabetic: No


-:   PLEURAL EFFUSION


-:  LYMPHOMA


-:  HYPERTENSION


-:  GOUTY ARTHROPATHY


-: port-a-cath (chemo)





- Family History


  ** Father


Family History: Reviewed- Non-Contributory





- Social History


Smoking Status: Former smoker


Alcohol use: No


CD- Drugs: No





Review of Systems


10-point ROS is otherwise unremarkable





Physical Examination





- Vital Signs


Temperature: 98.3 F


Blood Pressure: 96/69


Pulse: 86


Respirations: 18


Pulse Ox (%): 98





- Physical Exam


General: Alert, In no apparent distress, Oriented x3


HEENT: Atraumatic, PERRLA, Mucous membr. moist/pink, EOMI, Sclerae nonicteric


Neck: Supple, 2+ carotid pulse no bruit, No LAD, Without JVD or thyroid 

abnormality


Respiratory: Crackles/rales, Expiratory wheezes


Cardiovascular: Regular rate/rhythm, Normal S1 S2, No murmurs


Gastrointestinal: Normal bowel sounds, Soft and benign, Non-distended, No 

tenderness


Musculoskeletal: No clubbing, No swelling ( THE THE), No tenderness


Integumentary: No rashes


Neurological: Normal gait, Normal speech, Normal strength at 5/5 x4 extr, 

Normal tone, Sensation intact, Cranial nerves 3-12 intact, Normal affect


Lymphatics: No axilla or inguinal lymphadenopathy





- Studies


Laboratory Data (last 24 hrs)





04/14/19 19:05: PT 15.0 H, INR 1.28, APTT 28.3


04/14/19 19:05: WBC 1.6 L*, Hgb 10.9 L, Hct 32.3 L, Plt Count 194


04/14/19 19:05: Sodium 136, Potassium 3.6, BUN 17, Creatinine 0.86, Glucose 104

, Total Bilirubin 0.6, AST 24, ALT 50, Alkaline Phosphatase 108, Lipase 117








Assessment & Plan





- Problems (Diagnosis)


(1) Pleural effusion


Current Visit: Yes   Status: Acute   





(2) Pneumonia


Current Visit: Yes   Status: Acute   





(3) Neutropenic fever


Current Visit: Yes   Status: Acute   





- Plan





 PLAN:


1. CONTINUE WITH IV ANTIBIOTICS


2. AWAITING SPUTUM AND BLOOD CULTURE; PROCALCITONIN LEVEL


3. REPEAT CHEST X-RAY IN AM


4. IV HYDRATION


5. CONSULTATION WITH PULMONARY


6. CONTINUE WITH NEBS AS NEEDED


7. O2 PER PROTOCOL


8. OOB AND AMBULATE


9. REPEAT LABS INCLUDING CBC AND RENAL FUNCTION IN A.M.


10. GI AND DVT PROPHYLAXIS





Discharge Plan: Home


Plan to discharge in: Greater than 2 days





- Advance Directives


Does patient have a Living Will: No


Does patient have a Durable POA for Healthcare: No





- Code Status/Comfort Care


Code Status Assessed: Yes


Code Status: Full Code


Critical Care: No


Time Spent Managing PTS Care (In Minutes): 40

## 2019-04-16 LAB
BUN BLD-MCNC: 6 MG/DL (ref 7–18)
GLUCOSE SERPLBLD-MCNC: 94 MG/DL (ref 74–106)
HCT VFR BLD CALC: 25.3 % (ref 39.6–49)
LYMPHOCYTES # SPEC AUTO: 1.1 K/UL (ref 0.7–4.9)
MAGNESIUM SERPL-MCNC: 1.9 MG/DL (ref 1.8–2.4)
PMV BLD: 7.4 FL (ref 7.6–11.3)
POTASSIUM SERPL-SCNC: 3.3 MMOL/L (ref 3.5–5.1)
RBC # BLD: 3.09 M/UL (ref 4.33–5.43)

## 2019-04-16 RX ADMIN — ALBUTEROL SULFATE SCH MG: 2.5 SOLUTION RESPIRATORY (INHALATION) at 13:49

## 2019-04-16 RX ADMIN — ALBUTEROL SULFATE SCH MG: 2.5 SOLUTION RESPIRATORY (INHALATION) at 07:50

## 2019-04-16 RX ADMIN — IPRATROPIUM BROMIDE SCH MG: 0.5 SOLUTION RESPIRATORY (INHALATION) at 02:00

## 2019-04-16 RX ADMIN — SODIUM CHLORIDE SCH: 0.45 INJECTION, SOLUTION INTRAVENOUS at 12:00

## 2019-04-16 RX ADMIN — TBO-FILGRASTIM SCH MCG: 300 INJECTION, SOLUTION SUBCUTANEOUS at 11:07

## 2019-04-16 RX ADMIN — POTASSIUM CHLORIDE SCH MLS: 200 INJECTION, SOLUTION INTRAVENOUS at 11:06

## 2019-04-16 RX ADMIN — HYDROCODONE POLISTIREX AND CHLORPHENIRAMINE POLISTIREX PRN ML: 10; 8 SUSPENSION, EXTENDED RELEASE ORAL at 13:23

## 2019-04-16 RX ADMIN — FOLIC ACID SCH MG: 1 TABLET ORAL at 11:07

## 2019-04-16 RX ADMIN — IPRATROPIUM BROMIDE SCH MG: 0.5 SOLUTION RESPIRATORY (INHALATION) at 07:50

## 2019-04-16 RX ADMIN — POTASSIUM CHLORIDE SCH MLS: 200 INJECTION, SOLUTION INTRAVENOUS at 06:44

## 2019-04-16 RX ADMIN — ACETAMINOPHEN PRN MG: 500 TABLET, FILM COATED ORAL at 06:43

## 2019-04-16 RX ADMIN — SODIUM CHLORIDE SCH MLS: 0.45 INJECTION, SOLUTION INTRAVENOUS at 07:26

## 2019-04-16 RX ADMIN — Medication SCH ML: at 09:00

## 2019-04-16 RX ADMIN — ALBUTEROL SULFATE SCH MG: 2.5 SOLUTION RESPIRATORY (INHALATION) at 02:00

## 2019-04-16 RX ADMIN — ACYCLOVIR SCH MG: 400 TABLET ORAL at 11:07

## 2019-04-16 RX ADMIN — PIPERACILLIN SODIUM AND TAZOBACTAM SODIUM SCH MLS: 3; .375 INJECTION, POWDER, LYOPHILIZED, FOR SOLUTION INTRAVENOUS at 01:37

## 2019-04-16 RX ADMIN — PANTOPRAZOLE SODIUM SCH MG: 40 TABLET, DELAYED RELEASE ORAL at 05:47

## 2019-04-16 RX ADMIN — IPRATROPIUM BROMIDE SCH MG: 0.5 SOLUTION RESPIRATORY (INHALATION) at 19:34

## 2019-04-16 RX ADMIN — Medication SCH: at 21:00

## 2019-04-16 RX ADMIN — SODIUM CHLORIDE SCH MLS: 9 INJECTION, SOLUTION INTRAVENOUS at 23:53

## 2019-04-16 RX ADMIN — PIPERACILLIN SODIUM AND TAZOBACTAM SODIUM SCH MLS: 3; .375 INJECTION, POWDER, LYOPHILIZED, FOR SOLUTION INTRAVENOUS at 18:21

## 2019-04-16 RX ADMIN — ACYCLOVIR SCH MG: 400 TABLET ORAL at 21:28

## 2019-04-16 RX ADMIN — ALBUTEROL SULFATE SCH MG: 2.5 SOLUTION RESPIRATORY (INHALATION) at 19:34

## 2019-04-16 RX ADMIN — PIPERACILLIN SODIUM AND TAZOBACTAM SODIUM SCH MLS: 3; .375 INJECTION, POWDER, LYOPHILIZED, FOR SOLUTION INTRAVENOUS at 11:10

## 2019-04-16 RX ADMIN — SODIUM CHLORIDE SCH MLS: 9 INJECTION, SOLUTION INTRAVENOUS at 00:01

## 2019-04-16 RX ADMIN — ALLOPURINOL SCH MG: 300 TABLET ORAL at 11:07

## 2019-04-16 RX ADMIN — IPRATROPIUM BROMIDE SCH MG: 0.5 SOLUTION RESPIRATORY (INHALATION) at 13:49

## 2019-04-16 RX ADMIN — SODIUM CHLORIDE SCH MLS: 9 INJECTION, SOLUTION INTRAVENOUS at 13:19

## 2019-04-16 RX ADMIN — ENOXAPARIN SODIUM SCH MG: 30 INJECTION SUBCUTANEOUS at 18:21

## 2019-04-16 NOTE — RAD REPORT
EXAM DESCRIPTION:  Chris Pa And Lat (2 Views)4/16/2019 8:35 am

 

CLINICAL HISTORY:  Cough

 

COMPARISON:  April 15th

 

FINDINGS:  A pneumothorax is not seen status post right thoracentesis. Right pleural effusion is decr
eased in size.

## 2019-04-16 NOTE — P.PN
Subjective


Date of Service: 04/16/19


Primary Care Provider: NATI Villegas [facility]


Chief Complaint: Status post thoracentesis mild fever


Subjective: Improving (Patient had T-max of 101.3)





Physical Examination





- Vital Signs


Temperature: 98.4 F


Blood Pressure: 98/57


Pulse: 96


Respirations: 18


Pulse Ox (%): 100





- Physical Exam


General: Alert, In no apparent distress, Oriented x3, Cooperative


HEENT: Atraumatic


Neck: Supple


Respiratory: Crackles/rales (Minimal crackles)


Cardiovascular: Normal pulses ( to the right base), Regular rate/rhythm


Gastrointestinal: No masses, No rebound, No guarding


Neurological: Normal speech, Normal strength at 5/5 x4 extr, Normal tone





- Studies


Medications List Reviewed: Yes





Assessment & Plan


Discharge Plan: Home


Plan to discharge in: 24 Hours


Physician Review Additional Text: 





Impression:


Large right pleural effusion status post thoracentesis with history of prior 

thoracentesis


Patient with history of lymphoma


Neutropenia with fever


History hypertension





Plan:


Large right pleural effusion status post thoracentesis with history of prior 

thoracentesis:  Patient improving.  Repeat x-ray shows improvement in right 

pleural effusion after thoracentesis.  No pneumothorax noted patient had T-max 

of 101.3.  Will continue to monitor closely.  So far blood cultures negative.  

Case discussed with pulmonology.  Consulted Hematology/Oncology for further 

recommendation.  Case discussed with oncology.  Will continue with current plan 

of care.  Patient will need to be discharged with antibiotics and antiviral 

medication as previous.  Anticipate discharge in the next 24 hr once white 

count improved and afebrile for at least 24 hr.  Will need to wean off oxygen.


Patient with history of lymphoma:  Patient had recent chemotherapy.  This was 

his 2nd chemotherapy.  Patient to follow up with Oncology in [place].


Neutropenia with fever:  Continue with broad-spectrum IV antibiotic therapy.  

Blood cultures so far negative.  Blood cultures obtained and still pending.  No 

bacteria noted from fluid obtained from thoracentesis.  Await culture results 

from thoracentesis.  Continue as above.  Continue with medication to help with 

neutropenia.


History of hypertension:  Blood pressures remains stable off medication. Will 

continue to monitor off medication.  Patient no longer tachycardic.  Continue 

IV fluids.


Time Spent Managing Pts Care (In Minutes): 55

## 2019-04-16 NOTE — P.PN
Subjective


Date of Service: 04/16/19


Primary Care Provider: NATI [facility]


Chief Complaint: Status post thoracentesis mild fever


Subjective: Improving (Patient is improving still has a borderline temperature 

status post thoracentesis lymphocytic predominant effusion no evidence of a 

pleural infection)





Review of Systems


General: Weakness


Respiratory: Shortness of Breath





Physical Examination





- Vital Signs


Temperature: 98.4 F


Blood Pressure: 98/57


Pulse: 96


Respirations: 18


Pulse Ox (%): 100





- Physical Exam


General: Alert, Oriented x3


Neck: Supple


Respiratory: Diminished (Diminished on the right side)


Cardiovascular: No edema, Normal S1 S2





- Studies


Medications List Reviewed: Yes





Assessment & Plan





- Problems (Diagnosis)


(1) Pleural effusion


Current Visit: Yes   Status: Acute   


Plan: 


Patient is 58 years of age admitted with slight fever shortness of breath 

status post thoracentesis lymphocytic predominant effusion so far Gram stain 

and cultures are negative on the pleural fluid chemistries are pending is a 

send out test is pro calcitonin level is negative S is clinically stable 

possible discharge tomorrow evaluate for home O2 he is to follow up at [facility] flow cytometry has been sent on the pleural fluid discuss with pathology 

audible discharge tomorrow on Augmentin





Physician Review Additional Text: 





Impression:


Large right pleural effusion status post thoracentesis with history of prior 

thoracentesis


Patient with history of lymphoma


Neutropenia with fever


History hypertension





Plan:


Large right pleural effusion status post thoracentesis with history of prior 

thoracentesis:  Patient seen and evaluated by pulmonology.  Pulmonology agrees 

with thoracentesis.  Patient had thoracenteses earlier.  Over a L fluid 

removed.  Will continue to monitor closely.  Will maintain sats above 90%.  

Will reassess tomorrow.  Monitor CBC and BMP.  Recheck chest x-ray in the 

morning.  Anticipate possible discharge as early as tomorrow.


Patient with history of lymphoma:  Patient had recent chemotherapy.  This was 

his 2nd chemotherapy.  Patient to follow up with Oncology in [place].


Neutropenia with fever:  Continue with broad-spectrum IV antibiotic therapy.  

Blood cultures obtained and still pending.  No bacteria noted from fluid 

obtained from thoracentesis.  Patient has remained afebrile.  Will cover with 

his antiviral medication.  Continue with  Granix until neutropenia resolved.  

Continue monitor CBC.


History of hypertension:  Blood pressures remains stable off medication. Will 

continue to monitor off medication.

## 2019-04-17 VITALS — TEMPERATURE: 97.6 F | SYSTOLIC BLOOD PRESSURE: 92 MMHG | DIASTOLIC BLOOD PRESSURE: 63 MMHG

## 2019-04-17 VITALS — OXYGEN SATURATION: 98 %

## 2019-04-17 LAB
BUN BLD-MCNC: 4 MG/DL (ref 7–18)
DOHLE BOD BLD QL SMEAR: PRESENT
GLUCOSE SERPLBLD-MCNC: 90 MG/DL (ref 74–106)
HCT VFR BLD CALC: 23.7 % (ref 39.6–49)
HCT VFR BLD CALC: 24.9 % (ref 39.6–49)
LYMPHOCYTES # SPEC AUTO: 1.4 K/UL (ref 0.7–4.9)
MORPHOLOGY BLD-IMP: (no result)
PMV BLD: 7.4 FL (ref 7.6–11.3)
POTASSIUM SERPL-SCNC: 3.6 MMOL/L (ref 3.5–5.1)
RBC # BLD: 2.87 M/UL (ref 4.33–5.43)
TOXIC GRANULES BLD QL SMEAR: (no result)

## 2019-04-17 RX ADMIN — PANTOPRAZOLE SODIUM SCH MG: 40 TABLET, DELAYED RELEASE ORAL at 05:18

## 2019-04-17 RX ADMIN — FOLIC ACID SCH MG: 1 TABLET ORAL at 09:36

## 2019-04-17 RX ADMIN — HYDROCODONE POLISTIREX AND CHLORPHENIRAMINE POLISTIREX PRN ML: 10; 8 SUSPENSION, EXTENDED RELEASE ORAL at 09:46

## 2019-04-17 RX ADMIN — IPRATROPIUM BROMIDE SCH MG: 0.5 SOLUTION RESPIRATORY (INHALATION) at 01:52

## 2019-04-17 RX ADMIN — TBO-FILGRASTIM SCH MCG: 300 INJECTION, SOLUTION SUBCUTANEOUS at 09:35

## 2019-04-17 RX ADMIN — Medication SCH ML: at 09:00

## 2019-04-17 RX ADMIN — ACYCLOVIR SCH MG: 400 TABLET ORAL at 09:36

## 2019-04-17 RX ADMIN — IPRATROPIUM BROMIDE SCH MG: 0.5 SOLUTION RESPIRATORY (INHALATION) at 06:04

## 2019-04-17 RX ADMIN — SODIUM CHLORIDE SCH MLS: 9 INJECTION, SOLUTION INTRAVENOUS at 13:29

## 2019-04-17 RX ADMIN — PIPERACILLIN SODIUM AND TAZOBACTAM SODIUM SCH MLS: 3; .375 INJECTION, POWDER, LYOPHILIZED, FOR SOLUTION INTRAVENOUS at 01:35

## 2019-04-17 RX ADMIN — ALLOPURINOL SCH MG: 300 TABLET ORAL at 09:36

## 2019-04-17 RX ADMIN — ALBUTEROL SULFATE SCH: 2.5 SOLUTION RESPIRATORY (INHALATION) at 08:00

## 2019-04-17 RX ADMIN — ALBUTEROL SULFATE SCH MG: 2.5 SOLUTION RESPIRATORY (INHALATION) at 06:04

## 2019-04-17 RX ADMIN — HYDROCODONE POLISTIREX AND CHLORPHENIRAMINE POLISTIREX PRN ML: 10; 8 SUSPENSION, EXTENDED RELEASE ORAL at 15:56

## 2019-04-17 RX ADMIN — IPRATROPIUM BROMIDE SCH: 0.5 SOLUTION RESPIRATORY (INHALATION) at 08:00

## 2019-04-17 RX ADMIN — ALBUTEROL SULFATE SCH: 2.5 SOLUTION RESPIRATORY (INHALATION) at 14:00

## 2019-04-17 RX ADMIN — ALBUTEROL SULFATE SCH MG: 2.5 SOLUTION RESPIRATORY (INHALATION) at 01:52

## 2019-04-17 RX ADMIN — IPRATROPIUM BROMIDE SCH: 0.5 SOLUTION RESPIRATORY (INHALATION) at 14:00

## 2019-04-17 RX ADMIN — PIPERACILLIN SODIUM AND TAZOBACTAM SODIUM SCH MLS: 3; .375 INJECTION, POWDER, LYOPHILIZED, FOR SOLUTION INTRAVENOUS at 09:52

## 2019-04-17 NOTE — RAD REPORT
EXAM DESCRIPTION:  RAD - Chest Single View - 4/17/2019 6:57 am

 

CLINICAL HISTORY:  Follow up after thoracentesis

Chest pain.

 

COMPARISON:  Chest Pa And Lat (2 Views) dated 4/16/2019; Chest Single View dated 4/15/2019; Chest Sin
gle View dated 4/14/2019; Chest Single View dated 2/26/2019

 

FINDINGS:  Portable technique limits examination quality.

 

Moderate right pleural effusion is noted, unchanged. Right-sided port catheter its tip in the SVC. Th
e left lung is grossly clear. No pneumothorax evident. The heart is normal in size. No overall change
 seen since 04/16/2019 study.

## 2019-04-17 NOTE — ECHO
HEIGHT: 5 ft 4 in   WEIGHT: 147 lb 0 oz   DATE OF STUDY: 04/17/2019   REFER DR: Aneesh Montiel DO

2-DIMENSIONAL: YES

     M.MODE: YES

 DOPPLER: YES

COLOR FLOW: YES



                    TDS:  NO

PORTABLE: NO

 DEFINITY:  NO

BUBBLE STUDY: NO





DIAGNOSIS:  EVALUATE FOR CONGESTIVE HEART FAILURE



CARDIAC HISTORY:  

CATHERIZATION: NO

SURGERY: NO

PROSTHETIC VALVE: NO

PACEMAKER: NO





MEASUREMENTS (cm)

    DIASTOLIC (NORMALS)                 SYSTOLIC (NORMALS)

IVSd                 0.9 (0.6-1.2)                    LA Diam 3.1 (1.9-4.0)     LVEF       
  53%  

LVIDd               3.6 (3.5-5.7)                        LVIDs      2.7 (2.0-3.5)     %FS  
        27%

LVPWd             1.1 (0.6-1.2)

Ao Diam           2.7 (2.0-3.7)



2 DIMENSIONAL ASSESSMENT:

RIGHT ATRIUM:                   NORMAL

LEFT ATRIUM:       NORMAL



RIGHT VENTRICLE:            NORMAL

LEFT VENTRICLE: NORMAL



TRICUSPID VALVE:             NORMAL

MITRAL VALVE:     NORMAL



PULMONIC VALVE:             NORMAL

AORTIC VALVE:     NORMAL



PERICARDIAL EFFUSION: NONE

AORTIC ROOT:      NORMAL





LEFT VENTRICULAR WALL MOTION:     NORMAL



DOPPLER/COLOR FLOW:     PHYSIOLOGIC TRICUSPID REGURGITATION. NORMAL RIGHT SYSTOLIC 
PRESSURE.



COMMENTS:      NORMAL 2D ECHOCARDIOGRAM WITH DOPPLER.



TECHNOLOGIST:   TOI KAY

## 2019-04-17 NOTE — P.DS
Admission Date: 04/14/19


Discharge Date: 04/17/19


Primary Care Provider: PCP-[place]; [facility] also in [place]


Disposition: ROUTINE DISCHARGE


Discharge Condition: GOOD


Reason for Admission: Status post thoracentesis mild fever


Consultations: 





Pulmonary-Dr. Craven


Radiology


Oncology-Dr. Arellano


Procedures: 





CT Chest: 


FINDINGS:  Left lung field is clear. No left-sided pleural effusion, pleural 

thickening or mass. Right hemidiaphragm elevation is noted. Moderately large 

right pleural effusion is present. This is probably loculated. Patchy 

parenchymal opacification is present in the right base most likely atelectasis. 

No endobronchial lesion. 


 No pneumothorax. No chest wall mass or abnormal axillary lymphadenopathy.  No 

pericardial thickening or effusion. 


No abnormal mediastinal or hilar mass or lymphadenopathy seen. 


Limited upper abdomen imaging shows percutaneous biliary drainage. Pancreatic 

stent is also in place. 


IMPRESSION:  Moderately large loculated right pleural effusion. 


Right lung field parenchymal stranding is most likely atelectasis and scarring 

change. Acute pneumonia is not entirely excluded but felt to be on likely. Left 

hemithorax is clear.





Thorancentesis: 


COMPARISON:  No comparisons 


FINDINGS:  Preoperative diagnosis: Right pleural effusion


Post operative diagnosis: Same


Conscious Sedation: None.


Estimated blood loss: Minimal


Specimens:A small volume of fluid was sent for requested lab studies. 


The patient was placed in the upright recumbent position and the right 

posterior chest was prepped and draped in the usual sterile fashion.  1% 

Lidocaine was infiltrated into the soft tissues for local anesthesia.  Under 

sonographic guidance, a thoracentesis needle and 6 Lao catheter was advanced 

into the right pleural space. Approximately 1 liter yellow fluid was aspirated. 

Samples were sent to pathology for requested analysis. The patient tolerated 

the procedure without immediate complication and transferred to the floor in 

stable condition. 


IMPRESSION:  Successful ultrasound-guided thoracentesis as detailed.





Follow up CXR: 


COMPARISON:  Chest Pa And Lat (2 Views) dated 4/16/2019; Chest Single View 

dated 4/15/2019; Chest Single View dated 4/14/2019; Chest Single View dated 2/26 /2019 


FINDINGS:  Portable technique limits examination quality. 


Moderate right pleural effusion is noted, unchanged. Right-sided port catheter 

its tip in the SVC. The left lung is grossly clear. No pneumothorax evident. 

The heart is normal in size. No overall change seen since 04/16/2019 study.





Medical problem list: 


Large right loculated pleural effusion status post thoracentesis with history 

of prior thoracentesis


Anemia of chronic disease


Patient with history of lymphoma


Neutropenia with fever


History of hypertension


Suspect history of hepatitis-B


Brief History of Present Illness: 





58-year-old  male presented emergency room with increasing shortness of 

breath.  Patient with history of lymphoma.  He gets most of his care at [facility] [place] with oncology.  Patient recently started chemotherapy.  

Patient also had recent thoracentesis at [facility] [place].  Patient 

found to have large right pleural effusion.  Patient was admitted for further 

evaluation and treatment.


Hospital Course: 





Patient presented with shortness of breath.  Patient with past medical history 

significant for lymphoma with recent chemotherapy and thoracentesis in [place].

  Patient found to have large right loculated pleural effusion upon 

presentation.  Patient was seen and evaluated by pulmonology.  Pulmonology 

recommended thoracentesis.  Radiology consulted for radiology assisted 

thoracentesis.  This was done.  Approximate 1 L fluid removed.  Patient 

tolerated procedure well.  No pneumothorax noted thereafter.  Improvement of 

pleural effusion noted. Patient was eventually weaned off oxygen.  During the 

course of his stay patient also presented with fever.  Patient also found to be 

neutropenic. Case discussed with Oncology.  Patient was placed on broad-

spectrum IV antibiotic therapy and antiviral medication. He was also given 

Granix for the neutropenia.  So for blood cultures negative.  Fluid from 

thoracentesis also negative for bacteria. WBC now in normal range. At discharge 

patient will continue with broad-spectrum antibiotic-Levaquin 500 mg daily for 

7 days and and continue with his antiviral medication-acyclovir 400 mg 1 pill 

twice daily.  Recommend to follow up with Oncology in [place] within 1 week to 

follow up this hospitalization and continue his care.  If pleural effusion 

continues to redevelop, patient may require transfer to [place] to further 

evaluate.  Pleural effusion should improve with treatment of his lymphoma.  

Thorancentesis fluid pathology shows no carcinoma finding.  Flow cytometry 

sent.  This can be followed up by oncology.  Recommend to recheck chest x-ray 

in 2-4 weeks monitor resolution.  





Patient with history of hypertension.  Patient was taken off blood pressure 

medication due to low blood pressure.  At discharge Norvasc discontinued.  

Recommend to monitor his blood pressures and vitals closely.  If this pressure 

remains elevated as an outpatient this can be further addressed by his PCP.  

Patient has his PCP in [place].  Recommend to follow up with PCP within 1 week 

to follow up this hospitalization.  





Patient with GERD.  Patient will continue with Protonix 40 mg daily.





Patient with anemia of chronic disease. At discharge patient will continue with 

folic acid 1 mg daily and multi vitamin daily.  Recommend to recheck lab-CBC in 

1-2 weeks to monitor his progress.  This can be done by his oncologist.  

Patient may require outpatient transfusion in the few





Patient is taking Entecavir 500 mg daily I suspect for treatment of hepatitis-

B.  Will  verify with patient. 


Vital Signs/Physical Exam: 














Temp Pulse Resp BP Pulse Ox


 


 97.8 F   105 H  17   99/61   92 


 


 04/17/19 08:00  04/17/19 08:00  04/17/19 08:00  04/17/19 08:00  04/17/19 08:00








General: Alert, In no apparent distress, Oriented x3, Cooperative


HEENT: Atraumatic


Neck: Supple


Respiratory: Clear to auscultation bilaterally, Normal air movement


Cardiovascular: Regular rate/rhythm


Gastrointestinal: Normal bowel sounds, Soft and benign, Non-distended


Neurological: Normal speech, Normal strength at 5/5 x4 extr, Normal tone, 

Normal affect


Laboratory Data at Discharge: 














WBC  2.5 K/uL (4.3-10.9)  L D 04/17/19  05:20    


 


Hgb  8.3 g/dL (13.6-17.9)  L  04/17/19  08:49    


 


Hct  24.9 % (39.6-49.0)  L  04/17/19  08:49    


 


Plt Count  174 K/uL (152-406)  D 04/17/19  05:20    


 


PT  15.0 SECONDS (9.5-12.5)  H  04/15/19  05:25    


 


INR  1.28   04/15/19  05:25    


 


APTT  28.3 SECONDS (24.3-36.9)   04/15/19  05:25    


 


Sodium  140 mmol/L (136-145)   04/17/19  05:20    


 


Potassium  3.6 mmol/L (3.5-5.1)   04/17/19  05:20    


 


BUN  4 mg/dL (7-18)  L  04/17/19  05:20    


 


Creatinine  0.77 mg/dL (0.55-1.3)   04/17/19  05:20    


 


Glucose  90 mg/dL ()   04/17/19  05:20    


 


Phosphorus  2.8 mg/dL (2.5-4.9)   04/16/19  05:06    


 


Magnesium  1.9 mg/dL (1.8-2.4)   04/16/19  05:06    


 


Total Bilirubin  0.6 mg/dL (0.2-1.0)   04/15/19  05:25    


 


AST  21 U/L (15-37)   04/15/19  05:25    


 


ALT  39 U/L (12-78)   04/15/19  05:25    


 


Alkaline Phosphatase  85 U/L ()   04/15/19  05:25    


 


Triglycerides  82 mg/dL (<150)   04/15/19  05:25    


 


Cholesterol  135 mg/dL (<200)   04/15/19  05:25    


 


HDL Cholesterol  30 mg/dL (40-60)  L  04/15/19  05:25    


 


Cholesterol/HDL Ratio  4.50   04/15/19  05:25    


 


Lipase  117 U/L ()   04/14/19  19:05    








Home Medications: 








Acyclovir [Zovirax] 400 mg PO BID 04/14/19 


Allopurinol [Zyloprim*] 300 mg PO DAILY 04/14/19 


Entecavir [Baraclude] 0.5 mg PO DAILY 04/14/19 


Folic Acid 1 mg PO DAILY 04/14/19 


Ondansetron HCl [Zofran] 4 mg PO PRN 04/14/19 


Pantoprazole [Protonix Tab*] 40 mg PO DAILY 04/14/19 


Multivit with Iron,Minerals [Spectravite Senior] 1 each PO DAILY #30 tablet 04/ 17/19 


levoFLOXacin [Levaquin] 500 mg PO DAILY #7 tab 04/17/19 





New Medications: 


levoFLOXacin [Levaquin] 500 mg PO DAILY #7 tab


Multivit with Iron,Minerals [Spectravite Senior] 1 each PO DAILY #30 tablet


Patient Discharge Instructions: 1. Recommend to follow up with PCP in one week.

  2. Patient presented with shortness of breath.  Patient with past medical 

history significant for lymphoma with recent chemotherapy and thoracentesis in 

[place].  Patient found to have large right loculated pleural effusion upon 

presentation.  Patient was seen and evaluated by pulmonology.  Pulmonology 

recommended thoracentesis.  Radiology consulted for radiology assisted 

thoracentesis.  This was done.  Approximate 1 L fluid removed.  Patient 

tolerated procedure well.  No pneumothorax noted thereafter.  Improvement of 

pleural effusion noted. Patient was eventually weaned off oxygen.  During the 

course of his stay patient also presented with fever.  Patient also found to be 

neutropenic. Case discussed with Oncology.  Patient was placed on broad-

spectrum IV antibiotic therapy and antiviral medication. He was also given 

Granix for the neutropenia with resolution.  So for blood cultures negative.  

Fluid from thoracentesis also negative for bacteria. WBC now in normal range. 

At discharge patient will continue with broad-spectrum antibiotic-Levaquin 500 

mg daily for 7 days and and continue with his antiviral medication-acyclovir 

400 mg 1 pill twice daily.  Recommend to follow up with Oncology in [place] 

within 1 week to follow up this hospitalization and continue his care.  If 

pleural effusion continues to redevelop, patient may require transfer to 

[place] to further evaluate.  Pleural effusion should improve with treatment of 

his lymphoma. Thorancentesis fluid pathology shows no carcinoma finding.  Flow 

cytometry sent.  This can be followed up by oncology.  Recommend to recheck 

chest x-ray in 2-4 weeks monitor resolution.  3.  Patient with history of 

hypertension.  Patient was taken off blood pressure medication due to low blood 

pressure.  At discharge Norvasc discontinued.  Recommend to monitor his blood 

pressures and vitals closely.  If this pressure remains elevated as an 

outpatient this can be further addressed by his PCP.  Patient has his PCP in 

[place].  Recommend to follow up with PCP within 1 week to follow up this 

hospitalization.  4.  Patient with GERD.  Patient will continue with Protonix 

40 mg daily.  5.  Patient with anemia of chronic disease. At discharge patient 

will continue with folic acid 1 mg daily and multi vitamin daily.  Recommend to 

recheck lab-CBC in 1-2 weeks to monitor his progress.  This can be done by his 

oncologist.  Patient may require outpatient transfusion in the few.  6.  

Patient is taking Entecavir 500 mg daily I suspect for treatment of hepatitis-B.


Diet: AHA


Activity: Fall precautions


Time spent managing pt's care (in minutes): 55